# Patient Record
Sex: FEMALE | Race: WHITE | NOT HISPANIC OR LATINO | Employment: FULL TIME | URBAN - METROPOLITAN AREA
[De-identification: names, ages, dates, MRNs, and addresses within clinical notes are randomized per-mention and may not be internally consistent; named-entity substitution may affect disease eponyms.]

---

## 2018-05-24 ENCOUNTER — HOSPITAL ENCOUNTER (EMERGENCY)
Facility: HOSPITAL | Age: 33
Discharge: HOME | End: 2018-05-24
Attending: EMERGENCY MEDICINE
Payer: COMMERCIAL

## 2018-05-24 VITALS
HEIGHT: 59 IN | SYSTOLIC BLOOD PRESSURE: 138 MMHG | WEIGHT: 205.94 LBS | HEART RATE: 78 BPM | RESPIRATION RATE: 18 BRPM | OXYGEN SATURATION: 98 % | TEMPERATURE: 98 F | DIASTOLIC BLOOD PRESSURE: 72 MMHG | BODY MASS INDEX: 41.52 KG/M2

## 2018-05-24 DIAGNOSIS — J06.9 UPPER RESPIRATORY TRACT INFECTION, UNSPECIFIED TYPE: ICD-10-CM

## 2018-05-24 DIAGNOSIS — R05.9 COUGH: ICD-10-CM

## 2018-05-24 DIAGNOSIS — K11.20 PAROTIDITIS: Primary | ICD-10-CM

## 2018-05-24 LAB
ALBUMIN SERPL BCP-MCNC: 3.8 G/DL
ALP SERPL-CCNC: 52 U/L
ALT SERPL W/O P-5'-P-CCNC: 21 U/L
ANION GAP SERPL CALC-SCNC: 12 MMOL/L
APTT BLDCRRT: 27.1 SEC
AST SERPL-CCNC: 19 U/L
BACTERIA #/AREA URNS HPF: ABNORMAL /HPF
BASOPHILS # BLD AUTO: 0.05 K/UL
BASOPHILS NFR BLD: 0.4 %
BILIRUB SERPL-MCNC: 0.3 MG/DL
BILIRUB UR QL STRIP: NEGATIVE
BNP SERPL-MCNC: <10 PG/ML
BUN SERPL-MCNC: 13 MG/DL
CALCIUM SERPL-MCNC: 9.3 MG/DL
CHLORIDE SERPL-SCNC: 103 MMOL/L
CLARITY UR: ABNORMAL
CO2 SERPL-SCNC: 24 MMOL/L
COLOR UR: YELLOW
CREAT SERPL-MCNC: 0.8 MG/DL
DEPRECATED S PYO AG THROAT QL EIA: NEGATIVE
DIFFERENTIAL METHOD: NORMAL
EOSINOPHIL # BLD AUTO: 0.1 K/UL
EOSINOPHIL NFR BLD: 1 %
ERYTHROCYTE [DISTWIDTH] IN BLOOD BY AUTOMATED COUNT: 13.4 %
EST. GFR  (AFRICAN AMERICAN): >60 ML/MIN/1.73 M^2
EST. GFR  (NON AFRICAN AMERICAN): >60 ML/MIN/1.73 M^2
GLUCOSE SERPL-MCNC: 94 MG/DL
GLUCOSE UR QL STRIP: NEGATIVE
HCT VFR BLD AUTO: 41.2 %
HGB BLD-MCNC: 13.9 G/DL
HGB UR QL STRIP: ABNORMAL
INR PPP: 1
KETONES UR QL STRIP: NEGATIVE
LACTATE SERPL-SCNC: 1 MMOL/L
LEUKOCYTE ESTERASE UR QL STRIP: ABNORMAL
LYMPHOCYTES # BLD AUTO: 3.7 K/UL
LYMPHOCYTES NFR BLD: 29.8 %
MAGNESIUM SERPL-MCNC: 2.1 MG/DL
MCH RBC QN AUTO: 29.8 PG
MCHC RBC AUTO-ENTMCNC: 33.7 G/DL
MCV RBC AUTO: 88 FL
MICROSCOPIC COMMENT: ABNORMAL
MONOCYTES # BLD AUTO: 0.8 K/UL
MONOCYTES NFR BLD: 6.6 %
NEUTROPHILS # BLD AUTO: 7.7 K/UL
NEUTROPHILS NFR BLD: 62.2 %
NITRITE UR QL STRIP: POSITIVE
PH UR STRIP: 6 [PH] (ref 5–8)
PHOSPHATE SERPL-MCNC: 3.9 MG/DL
PLATELET # BLD AUTO: 293 K/UL
PMV BLD AUTO: 9.4 FL
POTASSIUM SERPL-SCNC: 3.6 MMOL/L
PROCALCITONIN SERPL IA-MCNC: 0.03 NG/ML
PROT SERPL-MCNC: 7.5 G/DL
PROT UR QL STRIP: NEGATIVE
PROTHROMBIN TIME: 10.1 SEC
RBC # BLD AUTO: 4.67 M/UL
RBC #/AREA URNS HPF: 2 /HPF (ref 0–4)
SODIUM SERPL-SCNC: 139 MMOL/L
SP GR UR STRIP: <=1.005 (ref 1–1.03)
TROPONIN I SERPL DL<=0.01 NG/ML-MCNC: <0.006 NG/ML
URN SPEC COLLECT METH UR: ABNORMAL
UROBILINOGEN UR STRIP-ACNC: NEGATIVE EU/DL
WBC # BLD AUTO: 12.43 K/UL
WBC #/AREA URNS HPF: 5 /HPF (ref 0–5)

## 2018-05-24 PROCEDURE — 83605 ASSAY OF LACTIC ACID: CPT

## 2018-05-24 PROCEDURE — 96374 THER/PROPH/DIAG INJ IV PUSH: CPT

## 2018-05-24 PROCEDURE — 87086 URINE CULTURE/COLONY COUNT: CPT

## 2018-05-24 PROCEDURE — 83880 ASSAY OF NATRIURETIC PEPTIDE: CPT

## 2018-05-24 PROCEDURE — 93005 ELECTROCARDIOGRAM TRACING: CPT

## 2018-05-24 PROCEDURE — 85025 COMPLETE CBC W/AUTO DIFF WBC: CPT

## 2018-05-24 PROCEDURE — 85730 THROMBOPLASTIN TIME PARTIAL: CPT

## 2018-05-24 PROCEDURE — 87040 BLOOD CULTURE FOR BACTERIA: CPT

## 2018-05-24 PROCEDURE — 87077 CULTURE AEROBIC IDENTIFY: CPT

## 2018-05-24 PROCEDURE — 96361 HYDRATE IV INFUSION ADD-ON: CPT

## 2018-05-24 PROCEDURE — 87081 CULTURE SCREEN ONLY: CPT

## 2018-05-24 PROCEDURE — 84484 ASSAY OF TROPONIN QUANT: CPT

## 2018-05-24 PROCEDURE — 80053 COMPREHEN METABOLIC PANEL: CPT

## 2018-05-24 PROCEDURE — 93010 ELECTROCARDIOGRAM REPORT: CPT | Mod: ,,, | Performed by: INTERNAL MEDICINE

## 2018-05-24 PROCEDURE — 87880 STREP A ASSAY W/OPTIC: CPT

## 2018-05-24 PROCEDURE — 25000003 PHARM REV CODE 250: Performed by: EMERGENCY MEDICINE

## 2018-05-24 PROCEDURE — 85610 PROTHROMBIN TIME: CPT

## 2018-05-24 PROCEDURE — 25500020 PHARM REV CODE 255: Performed by: EMERGENCY MEDICINE

## 2018-05-24 PROCEDURE — 99284 EMERGENCY DEPT VISIT MOD MDM: CPT | Mod: 25

## 2018-05-24 PROCEDURE — 63600175 PHARM REV CODE 636 W HCPCS: Performed by: EMERGENCY MEDICINE

## 2018-05-24 PROCEDURE — 81000 URINALYSIS NONAUTO W/SCOPE: CPT

## 2018-05-24 PROCEDURE — 83735 ASSAY OF MAGNESIUM: CPT

## 2018-05-24 PROCEDURE — 84145 PROCALCITONIN (PCT): CPT

## 2018-05-24 PROCEDURE — 87088 URINE BACTERIA CULTURE: CPT

## 2018-05-24 PROCEDURE — 84100 ASSAY OF PHOSPHORUS: CPT

## 2018-05-24 PROCEDURE — 87186 SC STD MICRODIL/AGAR DIL: CPT

## 2018-05-24 RX ORDER — FEXOFENADINE HCL 60 MG
60 TABLET ORAL DAILY
COMMUNITY

## 2018-05-24 RX ORDER — CLINDAMYCIN HYDROCHLORIDE 150 MG/1
300 CAPSULE ORAL 4 TIMES DAILY
Qty: 56 CAPSULE | Refills: 0 | Status: SHIPPED | OUTPATIENT
Start: 2018-05-24 | End: 2018-05-31

## 2018-05-24 RX ORDER — BENZONATATE 100 MG/1
100 CAPSULE ORAL 3 TIMES DAILY PRN
COMMUNITY
End: 2023-11-03

## 2018-05-24 RX ORDER — METHYLPREDNISOLONE 4 MG/1
TABLET ORAL
Qty: 1 PACKAGE | Refills: 0 | Status: SHIPPED | OUTPATIENT
Start: 2018-05-24 | End: 2023-11-03

## 2018-05-24 RX ORDER — DEXAMETHASONE SODIUM PHOSPHATE 4 MG/ML
4 INJECTION, SOLUTION INTRA-ARTICULAR; INTRALESIONAL; INTRAMUSCULAR; INTRAVENOUS; SOFT TISSUE
Status: COMPLETED | OUTPATIENT
Start: 2018-05-24 | End: 2018-05-24

## 2018-05-24 RX ORDER — CLINDAMYCIN HYDROCHLORIDE 150 MG/1
300 CAPSULE ORAL
Status: COMPLETED | OUTPATIENT
Start: 2018-05-24 | End: 2018-05-24

## 2018-05-24 RX ADMIN — CLINDAMYCIN HYDROCHLORIDE 300 MG: 150 CAPSULE ORAL at 06:05

## 2018-05-24 RX ADMIN — SODIUM CHLORIDE 2802 ML: 0.9 INJECTION, SOLUTION INTRAVENOUS at 02:05

## 2018-05-24 RX ADMIN — IOHEXOL 75 ML: 350 INJECTION, SOLUTION INTRAVENOUS at 04:05

## 2018-05-24 RX ADMIN — DEXAMETHASONE SODIUM PHOSPHATE 4 MG: 4 INJECTION, SOLUTION INTRAMUSCULAR; INTRAVENOUS at 06:05

## 2018-05-24 NOTE — ED PROVIDER NOTES
SCRIBE #1 NOTE: I, Adele Levine, am scribing for, and in the presence of, Rogelio Rollins Jr., MD. I have scribed the entire note.      History      Chief Complaint   Patient presents with    Facial Swelling     right sided facial swelling for one day. Denies toothache or trauma        Review of patient's allergies indicates:   Allergen Reactions    Penicillins Hives        HPI   HPI    5/24/2018, 2:39 AM   History obtained from the patient      History of Present Illness: Paul Fitzgerald is a 33 y.o. female patient who presents to the Emergency Department for facial swelling which onset gradually earlier today. Symptoms are constant and moderate in severity. Pt reports going to Urgent care earlier today for cold sxs. Pt reports being given a steroid shot and was prescribed an inhaler, [place] pearls, and antibiotics, but was told to hold off on the antibiotics. Pt reports sxs started after being seen at Urgent care. No mitigating or exacerbating factors reported. Associated sxs include cough, congestion, sore throat, and 3 episodes of diarrhea. Patient denies any CP, SOB, fever, chills, nausea, emesis, abd pain, difficulty swallowing, and all other sxs at this time. No further complaints or concerns at this time.         Arrival mode: Personal vehicle      PCP: Primary Doctor No       Past Medical History:  Past medical history reviewed not relevant      Past Surgical History:  Past surgical history reviewed not relevant      Family History:  Family history reviewed not relevant      Social History:  Social History    Social History Main Topics    Social History Main Topics    Smoking status: Unknown if ever smoked    Smokeless tobacco: Unknown if ever used    Alcohol Use: Unknown drinking history    Drug Use: Unknown if ever used    Sexual Activity: Unknown         ROS   Review of Systems   Constitutional: Negative for chills and fever.   HENT: Positive for congestion, facial swelling and sore throat. Negative  for trouble swallowing.    Respiratory: Positive for cough. Negative for shortness of breath.    Cardiovascular: Negative for chest pain.   Gastrointestinal: Positive for diarrhea. Negative for abdominal pain, nausea and vomiting.   Genitourinary: Negative for dysuria.   Musculoskeletal: Negative for back pain.   Skin: Negative for rash.   Neurological: Negative for weakness.   Hematological: Does not bruise/bleed easily.   All other systems reviewed and are negative.      Physical Exam      Initial Vitals [05/24/18 0217]   BP Pulse Resp Temp SpO2   129/69 82 20 97.5 °F (36.4 °C) 96 %      MAP       89          Physical Exam  Nursing Notes and Vital Signs Reviewed.  Constitutional: Patient is in no acute distress. Well-developed and well-nourished.  Head: Atraumatic. Normocephalic.  Eyes: PERRL. EOM intact. Conjunctivae are not pale. No scleral icterus.  Ears: Right TM normal. Left TM normal. No erythema. No bulging. No effusion or air-fluid levels. No perforation.   Nose: Patent nares. Turbinates are normal. No drainage.   Throat: Moist mucous membranes. Posterior oropharynx is symmetric without erythema. No trismus. Normal handling of secretions. No stridor. Large firm mass tenderness that is 6x8cm to R lower cheek (in parotid area). 1+ tonsillar edema with exudates. Airway intact.  Voice normal.   Neck: Supple. Full ROM. No lymphadenopathy. No palpable neck masses.  Cardiovascular: Regular rate. Regular rhythm. No murmurs, rubs, or gallops. Distal pulses are 2+ and symmetric.  Pulmonary/Chest: No respiratory distress. Clear to auscultation bilaterally. No wheezing or rales.  Abdominal: Soft and non-distended.  There is no tenderness.  No rebound, guarding, or rigidity. Good bowel sounds.  Genitourinary: No CVA tenderness  Musculoskeletal: Moves all extremities. No obvious deformities. No edema. No calf tenderness.  Skin: Warm and dry.  Neurological:  Alert, awake, and appropriate.  Normal speech.  No acute focal  "neurological deficits are appreciated.  Psychiatric: Normal affect. Good eye contact. Appropriate in content.    ED Course    Procedures  ED Vital Signs:  Vitals:    05/24/18 0217 05/24/18 0317 05/24/18 0642   BP: 129/69 (!) 140/76 138/72   Pulse: 82 83 78   Resp: 20 (!) 24 18   Temp: 97.5 °F (36.4 °C)  97.8 °F (36.6 °C)   TempSrc: Oral     SpO2: 96% 95% 98%   Weight: 93.4 kg (205 lb 14.6 oz)     Height: 4' 11" (1.499 m)         Abnormal Lab Results:  Labs Reviewed   COMPREHENSIVE METABOLIC PANEL - Abnormal; Notable for the following:        Result Value    Alkaline Phosphatase 52 (*)     All other components within normal limits   URINALYSIS - Abnormal; Notable for the following:     Appearance, UA Hazy (*)     Specific Gravity, UA <=1.005 (*)     Occult Blood UA 2+ (*)     Nitrite, UA Positive (*)     Leukocytes, UA 2+ (*)     All other components within normal limits   URINALYSIS MICROSCOPIC - Abnormal; Notable for the following:     Bacteria, UA Many (*)     All other components within normal limits   CULTURE, BLOOD    Narrative:     Aerobic and anaerobic   THROAT SCREEN, RAPID   CULTURE, BLOOD   CULTURE, URINE   CULTURE, STREP A,  THROAT   CBC W/ AUTO DIFFERENTIAL   LACTIC ACID, PLASMA   MAGNESIUM   PHOSPHORUS   APTT   PROTIME-INR   B-TYPE NATRIURETIC PEPTIDE   TROPONIN I   PROCALCITONIN        All Lab Results:  Results for orders placed or performed during the hospital encounter of 05/24/18   Blood culture x two cultures. Draw prior to antibiotics.   Result Value Ref Range    Blood Culture, Routine No Growth to date    Rapid strep screen   Result Value Ref Range    Rapid Strep A Screen Negative Negative   CBC auto differential   Result Value Ref Range    WBC 12.43 3.90 - 12.70 K/uL    RBC 4.67 4.00 - 5.40 M/uL    Hemoglobin 13.9 12.0 - 16.0 g/dL    Hematocrit 41.2 37.0 - 48.5 %    MCV 88 82 - 98 fL    MCH 29.8 27.0 - 31.0 pg    MCHC 33.7 32.0 - 36.0 g/dL    RDW 13.4 11.5 - 14.5 %    Platelets 293 150 - 350 " K/uL    MPV 9.4 9.2 - 12.9 fL    Gran # (ANC) 7.7 1.8 - 7.7 K/uL    Lymph # 3.7 1.0 - 4.8 K/uL    Mono # 0.8 0.3 - 1.0 K/uL    Eos # 0.1 0.0 - 0.5 K/uL    Baso # 0.05 0.00 - 0.20 K/uL    Gran% 62.2 38.0 - 73.0 %    Lymph% 29.8 18.0 - 48.0 %    Mono% 6.6 4.0 - 15.0 %    Eosinophil% 1.0 0.0 - 8.0 %    Basophil% 0.4 0.0 - 1.9 %    Differential Method Automated    Comprehensive metabolic panel   Result Value Ref Range    Sodium 139 136 - 145 mmol/L    Potassium 3.6 3.5 - 5.1 mmol/L    Chloride 103 95 - 110 mmol/L    CO2 24 23 - 29 mmol/L    Glucose 94 70 - 110 mg/dL    BUN, Bld 13 6 - 20 mg/dL    Creatinine 0.8 0.5 - 1.4 mg/dL    Calcium 9.3 8.7 - 10.5 mg/dL    Total Protein 7.5 6.0 - 8.4 g/dL    Albumin 3.8 3.5 - 5.2 g/dL    Total Bilirubin 0.3 0.1 - 1.0 mg/dL    Alkaline Phosphatase 52 (L) 55 - 135 U/L    AST 19 10 - 40 U/L    ALT 21 10 - 44 U/L    Anion Gap 12 8 - 16 mmol/L    eGFR if African American >60 >60 mL/min/1.73 m^2    eGFR if non African American >60 >60 mL/min/1.73 m^2   Lactic acid, plasma #1   Result Value Ref Range    Lactate (Lactic Acid) 1.0 0.5 - 2.2 mmol/L   Urinalysis   Result Value Ref Range    Specimen UA Urine, Clean Catch     Color, UA Yellow Yellow, Straw, Roxana    Appearance, UA Hazy (A) Clear    pH, UA 6.0 5.0 - 8.0    Specific Gravity, UA <=1.005 (A) 1.005 - 1.030    Protein, UA Negative Negative    Glucose, UA Negative Negative    Ketones, UA Negative Negative    Bilirubin (UA) Negative Negative    Occult Blood UA 2+ (A) Negative    Nitrite, UA Positive (A) Negative    Urobilinogen, UA Negative <2.0 EU/dL    Leukocytes, UA 2+ (A) Negative   Magnesium   Result Value Ref Range    Magnesium 2.1 1.6 - 2.6 mg/dL   Phosphorus   Result Value Ref Range    Phosphorus 3.9 2.7 - 4.5 mg/dL   APTT   Result Value Ref Range    aPTT 27.1 21.0 - 32.0 sec   Protime-INR   Result Value Ref Range    Prothrombin Time 10.1 9.0 - 12.5 sec    INR 1.0 0.8 - 1.2   Brain natriuretic peptide   Result Value Ref Range     BNP <10 0 - 99 pg/mL   Troponin I   Result Value Ref Range    Troponin I <0.006 0.000 - 0.026 ng/mL   Procalcitonin   Result Value Ref Range    Procalcitonin 0.03 <0.25 ng/mL   Urinalysis Microscopic   Result Value Ref Range    RBC, UA 2 0 - 4 /hpf    WBC, UA 5 0 - 5 /hpf    Bacteria, UA Many (A) None-Occ /hpf    Microscopic Comment SEE COMMENT          Imaging Results:  Imaging Results          CT Soft Tissue Neck With Contrast (In process)    Procedure changed from CT Maxillofacial With Contrast  Per [facility], pt's CT results enlarged right parotid gland suggestive of acute parotitis. Adjacent fat stranding and inflammation involving the platysma.                X-Ray Chest AP Portable (Preliminary result)  Result time 05/24/18 04:50:33    ED Interpretation by Rogelio Rollins Jr., MD (05/24/18 04:50:33, [facility] - , Emergency Medicine)    naf                             The EKG was ordered, reviewed, and independently interpreted by the ED provider.  Interpretation time: 2:51  Rate: 86 BPM  Rhythm: normal sinus rhythm  Interpretation: Low voltage QRS. No STEMI.             The Emergency Provider reviewed the vital signs and test results, which are outlined above.    ED Discussion     5:31 AM: Reassessed pt at this time. Pt reports she feels a yeast infection coming on so she has been taking Monistat for it. Discussed with pt all pertinent ED information and results. Discussed pt dx and plan of tx. Gave pt all f/u and return to the ED instructions if symptoms worsen or if pt unable to be seen by PCP/ENT within 24 hours for reevaluation. All questions and concerns were addressed at this time. Pt expresses understanding of information and instructions, and is comfortable with plan to discharge. Pt is stable for discharge.    Regarding UPPER RESPIRATORY ILLNESS, for treatment, I encouraged patient to: drink plenty of fluids; get lots of rest; take medications as prescribed; use  over-the-counter medications for symptomatic treatment;  and use a humidifier or steam in the bathroom to improve patency of upper airway.  Patient instructed to notify primary care provider, or return to the emergency department, if the they: have a cough most days or have a cough that returns frequently; begin coughing up blood; develop a high fever or shaking chills; have a low-grade fever for three or more days; develop thick, greenish mucus, especially if it has a bad smell; and experience shortness of breath or chest pain. For prevention, discussed with patient the importance of refraining from smoking (if applicable), getting annual influenza vaccines, reducing exposure to air pollution, and the need to frequently wash hands to avoid spread of infection.       ED Medication(s):  Medications   sodium chloride 0.9% bolus 2,802 mL (0 mL/kg × 93.4 kg (Dosing Weight) Intravenous Stopped 5/24/18 0557)   omnipaque 350 iohexol 75 mL (75 mLs Intravenous Given 5/24/18 0406)   clindamycin capsule 300 mg (300 mg Oral Given 5/24/18 0605)   dexamethasone injection 4 mg (4 mg Intravenous Given 5/24/18 0605)       Discharge Medication List as of 5/24/2018  5:39 AM      START taking these medications    Details   clindamycin (CLEOCIN) 150 MG capsule Take 2 capsules (300 mg total) by mouth 4 (four) times daily., Starting Thu 5/24/2018, Until Thu 5/31/2018, Print      methylPREDNISolone (MEDROL DOSEPACK) 4 mg tablet Take medrol dose pack as directed, Print             Follow-up Information     [facility]. Schedule an appointment as soon as possible for a visit in 1 day.    Specialty:  Internal Medicine  Contact information:  [address]  [facility] [address] [phone]  Additional information:  (off [facility]) 1st floor           [facility] In 1 day.    Specialty:  Otolaryngology  Contact information:  [address]  [facility] [address] [phone]  Additional  information:  (off [facility]) 4th floor           [facility]    Specialty:  Emergency Medicine  Why:  As needed, If symptoms worsen  Contact information:  [address]  [facility] [address] [phone]                   Medical Decision Making    Medical Decision Making:   Clinical Tests:   Lab Tests: Ordered and Reviewed  Radiological Study: Ordered and Reviewed  Medical Tests: Ordered and Reviewed           Scribe Attestation:   Scribe #1: I performed the above scribed service and the documentation accurately describes the services I performed. I attest to the accuracy of the note.    Attending:   Physician Attestation Statement for Scribe #1: I, Rogelio Rollins Jr., MD, personally performed the services described in this documentation, as scribed by Adele Levine, in my presence, and it is both accurate and complete.          Clinical Impression       ICD-10-CM ICD-9-CM   1. Parotiditis K11.20 527.2   2. Cough R05 786.2   3. Upper respiratory tract infection, unspecified type J06.9 465.9       Disposition:   Disposition: Discharged  Condition: Stable         Rogelio Rollins Jr., MD  05/24/18 2031

## 2018-05-26 ENCOUNTER — TELEPHONE (OUTPATIENT)
Dept: EMERGENCY MEDICINE | Facility: HOSPITAL | Age: 33
End: 2018-05-26

## 2018-05-26 LAB
BACTERIA THROAT CULT: NORMAL
BACTERIA UR CULT: NORMAL

## 2018-05-26 NOTE — TELEPHONE ENCOUNTER
----- Message from Colten Jane MD sent at 5/25/2018  6:59 PM CDT -----  UTI; continue present Clindamycin for facial infection and add Cipro 500 bid x 5 days

## 2018-05-29 LAB
BACTERIA BLD CULT: NORMAL
BACTERIA BLD CULT: NORMAL

## 2021-08-16 ENCOUNTER — OFFICE VISIT (OUTPATIENT)
Dept: URGENT CARE | Facility: CLINIC | Age: 36
End: 2021-08-16
Payer: COMMERCIAL

## 2021-08-16 ENCOUNTER — PATIENT MESSAGE (OUTPATIENT)
Dept: URGENT CARE | Facility: CLINIC | Age: 36
End: 2021-08-16

## 2021-08-16 VITALS
TEMPERATURE: 99 F | HEIGHT: 59 IN | SYSTOLIC BLOOD PRESSURE: 106 MMHG | BODY MASS INDEX: 35.28 KG/M2 | WEIGHT: 175 LBS | RESPIRATION RATE: 16 BRPM | HEART RATE: 91 BPM | DIASTOLIC BLOOD PRESSURE: 68 MMHG | OXYGEN SATURATION: 96 %

## 2021-08-16 DIAGNOSIS — U07.1 COVID-19 VIRUS INFECTION: ICD-10-CM

## 2021-08-16 DIAGNOSIS — F17.200 SMOKER: ICD-10-CM

## 2021-08-16 DIAGNOSIS — Z11.59 SCREENING FOR VIRAL DISEASE: Primary | ICD-10-CM

## 2021-08-16 LAB
CTP QC/QA: YES
SARS-COV-2 RDRP RESP QL NAA+PROBE: POSITIVE

## 2021-08-16 PROCEDURE — U0002: ICD-10-PCS | Mod: QW,S$GLB,, | Performed by: INTERNAL MEDICINE

## 2021-08-16 PROCEDURE — 3008F PR BODY MASS INDEX (BMI) DOCUMENTED: ICD-10-PCS | Mod: CPTII,S$GLB,, | Performed by: INTERNAL MEDICINE

## 2021-08-16 PROCEDURE — 3074F PR MOST RECENT SYSTOLIC BLOOD PRESSURE < 130 MM HG: ICD-10-PCS | Mod: CPTII,S$GLB,, | Performed by: INTERNAL MEDICINE

## 2021-08-16 PROCEDURE — 1159F PR MEDICATION LIST DOCUMENTED IN MEDICAL RECORD: ICD-10-PCS | Mod: CPTII,S$GLB,, | Performed by: INTERNAL MEDICINE

## 2021-08-16 PROCEDURE — 99203 OFFICE O/P NEW LOW 30 MIN: CPT | Mod: S$GLB,,, | Performed by: INTERNAL MEDICINE

## 2021-08-16 PROCEDURE — 99203 PR OFFICE/OUTPT VISIT, NEW, LEVL III, 30-44 MIN: ICD-10-PCS | Mod: S$GLB,,, | Performed by: INTERNAL MEDICINE

## 2021-08-16 PROCEDURE — 3008F BODY MASS INDEX DOCD: CPT | Mod: CPTII,S$GLB,, | Performed by: INTERNAL MEDICINE

## 2021-08-16 PROCEDURE — 1160F PR REVIEW ALL MEDS BY PRESCRIBER/CLIN PHARMACIST DOCUMENTED: ICD-10-PCS | Mod: CPTII,S$GLB,, | Performed by: INTERNAL MEDICINE

## 2021-08-16 PROCEDURE — 3074F SYST BP LT 130 MM HG: CPT | Mod: CPTII,S$GLB,, | Performed by: INTERNAL MEDICINE

## 2021-08-16 PROCEDURE — U0002 COVID-19 LAB TEST NON-CDC: HCPCS | Mod: QW,S$GLB,, | Performed by: INTERNAL MEDICINE

## 2021-08-16 PROCEDURE — 3078F DIAST BP <80 MM HG: CPT | Mod: CPTII,S$GLB,, | Performed by: INTERNAL MEDICINE

## 2021-08-16 PROCEDURE — 3078F PR MOST RECENT DIASTOLIC BLOOD PRESSURE < 80 MM HG: ICD-10-PCS | Mod: CPTII,S$GLB,, | Performed by: INTERNAL MEDICINE

## 2021-08-16 PROCEDURE — 1160F RVW MEDS BY RX/DR IN RCRD: CPT | Mod: CPTII,S$GLB,, | Performed by: INTERNAL MEDICINE

## 2021-08-16 PROCEDURE — 1159F MED LIST DOCD IN RCRD: CPT | Mod: CPTII,S$GLB,, | Performed by: INTERNAL MEDICINE

## 2021-08-18 ENCOUNTER — INFUSION (OUTPATIENT)
Dept: INFECTIOUS DISEASES | Facility: HOSPITAL | Age: 36
End: 2021-08-18
Attending: INTERNAL MEDICINE
Payer: COMMERCIAL

## 2021-08-18 ENCOUNTER — TELEPHONE (OUTPATIENT)
Dept: URGENT CARE | Facility: CLINIC | Age: 36
End: 2021-08-18

## 2021-08-18 VITALS
OXYGEN SATURATION: 96 % | SYSTOLIC BLOOD PRESSURE: 117 MMHG | TEMPERATURE: 97 F | BODY MASS INDEX: 35.28 KG/M2 | HEIGHT: 59 IN | DIASTOLIC BLOOD PRESSURE: 58 MMHG | HEART RATE: 78 BPM | RESPIRATION RATE: 18 BRPM | WEIGHT: 175 LBS

## 2021-08-18 DIAGNOSIS — U07.1 COVID-19 VIRUS DETECTED: Primary | ICD-10-CM

## 2021-08-18 DIAGNOSIS — U07.1 COVID-19: Primary | ICD-10-CM

## 2021-08-18 PROCEDURE — M0243 CASIRIVI AND IMDEVI INFUSION: HCPCS | Performed by: INTERNAL MEDICINE

## 2021-08-18 PROCEDURE — 63600175 PHARM REV CODE 636 W HCPCS: Performed by: INTERNAL MEDICINE

## 2021-08-18 PROCEDURE — 25000003 PHARM REV CODE 250: Performed by: INTERNAL MEDICINE

## 2021-08-18 RX ORDER — SODIUM CHLORIDE 0.9 % (FLUSH) 0.9 %
10 SYRINGE (ML) INJECTION
Status: DISCONTINUED | OUTPATIENT
Start: 2021-08-18 | End: 2023-11-03

## 2021-08-18 RX ORDER — PROMETHAZINE HYDROCHLORIDE AND DEXTROMETHORPHAN HYDROBROMIDE 6.25; 15 MG/5ML; MG/5ML
5 SYRUP ORAL EVERY 4 HOURS PRN
Qty: 180 ML | Refills: 0 | Status: SHIPPED | OUTPATIENT
Start: 2021-08-18 | End: 2021-08-28

## 2021-08-18 RX ORDER — DIPHENHYDRAMINE HYDROCHLORIDE 50 MG/ML
25 INJECTION INTRAMUSCULAR; INTRAVENOUS ONCE AS NEEDED
Status: DISCONTINUED | OUTPATIENT
Start: 2021-08-18 | End: 2023-11-03

## 2021-08-18 RX ORDER — ACETAMINOPHEN 325 MG/1
650 TABLET ORAL ONCE AS NEEDED
Status: DISCONTINUED | OUTPATIENT
Start: 2021-08-18 | End: 2023-11-03

## 2021-08-18 RX ORDER — ONDANSETRON 4 MG/1
4 TABLET, ORALLY DISINTEGRATING ORAL ONCE AS NEEDED
Status: DISCONTINUED | OUTPATIENT
Start: 2021-08-18 | End: 2023-11-03

## 2021-08-18 RX ORDER — ALBUTEROL SULFATE 90 UG/1
2 AEROSOL, METERED RESPIRATORY (INHALATION)
Status: DISCONTINUED | OUTPATIENT
Start: 2021-08-18 | End: 2023-11-03

## 2021-08-18 RX ORDER — ALBUTEROL SULFATE 90 UG/1
2 AEROSOL, METERED RESPIRATORY (INHALATION) EVERY 6 HOURS PRN
Qty: 18 G | Refills: 0 | Status: SHIPPED | OUTPATIENT
Start: 2021-08-18 | End: 2023-11-03

## 2021-08-18 RX ORDER — EPINEPHRINE 0.3 MG/.3ML
0.3 INJECTION SUBCUTANEOUS
Status: DISCONTINUED | OUTPATIENT
Start: 2021-08-18 | End: 2023-11-03

## 2021-08-18 RX ADMIN — CASIRIVIMAB AND IMDEVIMAB 600 MG: 600; 600 INJECTION, SOLUTION, CONCENTRATE INTRAVENOUS at 10:08

## 2022-02-22 ENCOUNTER — PATIENT MESSAGE (OUTPATIENT)
Dept: RESEARCH | Facility: HOSPITAL | Age: 37
End: 2022-02-22
Payer: COMMERCIAL

## 2022-03-15 ENCOUNTER — OFFICE VISIT (OUTPATIENT)
Dept: URGENT CARE | Facility: CLINIC | Age: 37
End: 2022-03-15
Payer: COMMERCIAL

## 2022-03-15 VITALS
OXYGEN SATURATION: 96 % | WEIGHT: 165 LBS | DIASTOLIC BLOOD PRESSURE: 59 MMHG | HEART RATE: 89 BPM | RESPIRATION RATE: 16 BRPM | HEIGHT: 59 IN | TEMPERATURE: 100 F | BODY MASS INDEX: 33.26 KG/M2 | SYSTOLIC BLOOD PRESSURE: 95 MMHG

## 2022-03-15 DIAGNOSIS — J02.0 STREP PHARYNGITIS: Primary | ICD-10-CM

## 2022-03-15 DIAGNOSIS — Z71.1 CONCERN ABOUT SEXUALLY TRANSMITTED DISEASE IN FEMALE WITHOUT DIAGNOSIS: ICD-10-CM

## 2022-03-15 LAB
CTP QC/QA: YES
CTP QC/QA: YES
MOLECULAR STREP A: POSITIVE
SARS-COV-2 RDRP RESP QL NAA+PROBE: NEGATIVE

## 2022-03-15 PROCEDURE — 3008F PR BODY MASS INDEX (BMI) DOCUMENTED: ICD-10-PCS | Mod: CPTII,S$GLB,, | Performed by: NURSE PRACTITIONER

## 2022-03-15 PROCEDURE — 3078F PR MOST RECENT DIASTOLIC BLOOD PRESSURE < 80 MM HG: ICD-10-PCS | Mod: CPTII,S$GLB,, | Performed by: NURSE PRACTITIONER

## 2022-03-15 PROCEDURE — 96372 THER/PROPH/DIAG INJ SC/IM: CPT | Mod: S$GLB,,, | Performed by: NURSE PRACTITIONER

## 2022-03-15 PROCEDURE — 1160F PR REVIEW ALL MEDS BY PRESCRIBER/CLIN PHARMACIST DOCUMENTED: ICD-10-PCS | Mod: CPTII,S$GLB,, | Performed by: NURSE PRACTITIONER

## 2022-03-15 PROCEDURE — 1160F RVW MEDS BY RX/DR IN RCRD: CPT | Mod: CPTII,S$GLB,, | Performed by: NURSE PRACTITIONER

## 2022-03-15 PROCEDURE — 3078F DIAST BP <80 MM HG: CPT | Mod: CPTII,S$GLB,, | Performed by: NURSE PRACTITIONER

## 2022-03-15 PROCEDURE — 99213 PR OFFICE/OUTPT VISIT, EST, LEVL III, 20-29 MIN: ICD-10-PCS | Mod: 25,S$GLB,CS, | Performed by: NURSE PRACTITIONER

## 2022-03-15 PROCEDURE — 99213 OFFICE O/P EST LOW 20 MIN: CPT | Mod: 25,S$GLB,CS, | Performed by: NURSE PRACTITIONER

## 2022-03-15 PROCEDURE — 1159F MED LIST DOCD IN RCRD: CPT | Mod: CPTII,S$GLB,, | Performed by: NURSE PRACTITIONER

## 2022-03-15 PROCEDURE — 3074F SYST BP LT 130 MM HG: CPT | Mod: CPTII,S$GLB,, | Performed by: NURSE PRACTITIONER

## 2022-03-15 PROCEDURE — 1159F PR MEDICATION LIST DOCUMENTED IN MEDICAL RECORD: ICD-10-PCS | Mod: CPTII,S$GLB,, | Performed by: NURSE PRACTITIONER

## 2022-03-15 PROCEDURE — 87651 STREP A DNA AMP PROBE: CPT | Mod: QW,S$GLB,, | Performed by: NURSE PRACTITIONER

## 2022-03-15 PROCEDURE — 87651 POCT STREP A MOLECULAR: ICD-10-PCS | Mod: QW,S$GLB,, | Performed by: NURSE PRACTITIONER

## 2022-03-15 PROCEDURE — U0002 COVID-19 LAB TEST NON-CDC: HCPCS | Mod: QW,S$GLB,, | Performed by: NURSE PRACTITIONER

## 2022-03-15 PROCEDURE — 96372 PR INJECTION,THERAP/PROPH/DIAG2ST, IM OR SUBCUT: ICD-10-PCS | Mod: S$GLB,,, | Performed by: NURSE PRACTITIONER

## 2022-03-15 PROCEDURE — 3008F BODY MASS INDEX DOCD: CPT | Mod: CPTII,S$GLB,, | Performed by: NURSE PRACTITIONER

## 2022-03-15 PROCEDURE — U0002: ICD-10-PCS | Mod: QW,S$GLB,, | Performed by: NURSE PRACTITIONER

## 2022-03-15 PROCEDURE — 3074F PR MOST RECENT SYSTOLIC BLOOD PRESSURE < 130 MM HG: ICD-10-PCS | Mod: CPTII,S$GLB,, | Performed by: NURSE PRACTITIONER

## 2022-03-15 RX ORDER — LIDOCAINE HYDROCHLORIDE 10 MG/ML
2.1 INJECTION INFILTRATION; PERINEURAL
Status: COMPLETED | OUTPATIENT
Start: 2022-03-15 | End: 2022-03-15

## 2022-03-15 RX ORDER — CEFTRIAXONE 1 G/1
1 INJECTION, POWDER, FOR SOLUTION INTRAMUSCULAR; INTRAVENOUS
Status: COMPLETED | OUTPATIENT
Start: 2022-03-15 | End: 2022-03-15

## 2022-03-15 RX ORDER — CEPHALEXIN 500 MG/1
500 CAPSULE ORAL EVERY 12 HOURS
Qty: 20 CAPSULE | Refills: 0 | Status: SHIPPED | OUTPATIENT
Start: 2022-03-15 | End: 2022-03-25

## 2022-03-15 RX ADMIN — CEFTRIAXONE 1 G: 1 INJECTION, POWDER, FOR SOLUTION INTRAMUSCULAR; INTRAVENOUS at 09:03

## 2022-03-15 RX ADMIN — LIDOCAINE HYDROCHLORIDE 2.1 ML: 10 INJECTION INFILTRATION; PERINEURAL at 09:03

## 2022-03-15 NOTE — PROGRESS NOTES
"Subjective:       Patient ID: Paul Fitzgerald is a 37 y.o. female.    Vitals:  height is 4' 11" (1.499 m) and weight is 74.8 kg (165 lb). Her temperature is 100.3 °F (37.9 °C). Her blood pressure is 95/59 (abnormal) and her pulse is 89. Her respiration is 16 and oxygen saturation is 96%.     Chief Complaint: Sore Throat    38 y/o female here for sore throat x3 days with pain upon swallowing.  Also c/o frontal headache. Has productive cough, but reports most likely a smoker's cough and feels unrelated to throat problem, at this time.  Pt also concerned of possibility of STI infection to throat. Denies vaginal discharge, pain, or other symptoms. No known STI contact.   Has h/o Covid in August 2021. Patient reports she got antiviral effusion. Patient reports she is fully covid vaccinated. Denies chest pain, SOB, and wheeze. Denies nausea, vomiting, and diarrhea.     Sore Throat   This is a new problem. The current episode started in the past 7 days. The problem has been gradually worsening. The pain is worse on the left side. There has been no fever. The pain is at a severity of 6/10. Associated symptoms include coughing (productive), headaches, swollen glands and trouble swallowing. Pertinent negatives include no congestion. Associated symptoms comments: weakness. She has had no exposure to strep or mono. She has tried NSAIDs for the symptoms. The treatment provided mild relief.       HENT: Positive for sore throat and trouble swallowing. Negative for congestion.    Respiratory: Positive for cough (productive).    Neurological: Positive for headaches.       Objective:      Physical Exam   Constitutional: She is oriented to person, place, and time.   HENT:   Head: Normocephalic.   Ears:   Right Ear: Tympanic membrane, external ear and ear canal normal.   Left Ear: Tympanic membrane, external ear and ear canal normal.   Nose: Nose normal.   Mouth/Throat: Uvula is midline. Mucous membranes are moist. Posterior " oropharyngeal edema and posterior oropharyngeal erythema present. Tonsils are 3+ on the right. Tonsils are 3+ on the left. Oropharynx is clear.   Eyes: Conjunctivae are normal. Right eye exhibits no discharge. Left eye exhibits no discharge.   Cardiovascular: Normal rate, regular rhythm, normal heart sounds and normal pulses.   Pulmonary/Chest: Effort normal and breath sounds normal.   Abdominal: Normal appearance. Soft. flat abdomen  Musculoskeletal: Normal range of motion.         General: Normal range of motion.   Neurological: She is alert and oriented to person, place, and time.   Skin: Skin is warm and dry. Capillary refill takes more than 3 seconds.   Psychiatric: Her behavior is normal. Mood normal.   Nursing note and vitals reviewed.        Assessment:       1. Strep pharyngitis    2. Concern about sexually transmitted disease in female without diagnosis        Results for orders placed or performed in visit on 03/15/22   POCT COVID-19 Rapid Screening   Result Value Ref Range    POC Rapid COVID Negative Negative     Acceptable Yes    POCT Strep A, Molecular   Result Value Ref Range    Molecular Strep A, POC Positive (A) Negative     Acceptable Yes      Plan:         Strep pharyngitis  -     POCT COVID-19 Rapid Screening  -     POCT Strep A, Molecular  -     Cancel: C.trach/N.gonor AMP RNA, (Non-Genital)  -     cephALEXin (KEFLEX) 500 MG capsule; Take 1 capsule (500 mg total) by mouth every 12 (twelve) hours. for 10 days  Dispense: 20 capsule; Refill: 0    Concern about sexually transmitted disease in female without diagnosis  -     cefTRIAXone injection 1 g    Other orders  -     LIDOcaine HCL 10 mg/ml (1%) injection 2.1 mL         Patient Instructions   Oral fluids  Rest  Steam (hot tea, hot showers)  Droplet and contact precautions

## 2022-08-06 ENCOUNTER — OFFICE VISIT (OUTPATIENT)
Dept: URGENT CARE | Facility: CLINIC | Age: 37
End: 2022-08-06
Payer: COMMERCIAL

## 2022-08-06 VITALS
BODY MASS INDEX: 33.26 KG/M2 | OXYGEN SATURATION: 98 % | HEIGHT: 59 IN | HEART RATE: 75 BPM | WEIGHT: 165 LBS | DIASTOLIC BLOOD PRESSURE: 65 MMHG | RESPIRATION RATE: 17 BRPM | SYSTOLIC BLOOD PRESSURE: 121 MMHG

## 2022-08-06 DIAGNOSIS — T19.2XXA FOREIGN BODY IN VAGINA, INITIAL ENCOUNTER: Primary | ICD-10-CM

## 2022-08-06 PROCEDURE — 3078F DIAST BP <80 MM HG: CPT | Mod: CPTII,S$GLB,,

## 2022-08-06 PROCEDURE — 3008F BODY MASS INDEX DOCD: CPT | Mod: CPTII,S$GLB,,

## 2022-08-06 PROCEDURE — 3074F PR MOST RECENT SYSTOLIC BLOOD PRESSURE < 130 MM HG: ICD-10-PCS | Mod: CPTII,S$GLB,,

## 2022-08-06 PROCEDURE — 99214 OFFICE O/P EST MOD 30 MIN: CPT | Mod: S$GLB,,,

## 2022-08-06 PROCEDURE — 3078F PR MOST RECENT DIASTOLIC BLOOD PRESSURE < 80 MM HG: ICD-10-PCS | Mod: CPTII,S$GLB,,

## 2022-08-06 PROCEDURE — 1160F PR REVIEW ALL MEDS BY PRESCRIBER/CLIN PHARMACIST DOCUMENTED: ICD-10-PCS | Mod: CPTII,S$GLB,,

## 2022-08-06 PROCEDURE — 1159F MED LIST DOCD IN RCRD: CPT | Mod: CPTII,S$GLB,,

## 2022-08-06 PROCEDURE — 99214 PR OFFICE/OUTPT VISIT, EST, LEVL IV, 30-39 MIN: ICD-10-PCS | Mod: S$GLB,,,

## 2022-08-06 PROCEDURE — 3074F SYST BP LT 130 MM HG: CPT | Mod: CPTII,S$GLB,,

## 2022-08-06 PROCEDURE — 3008F PR BODY MASS INDEX (BMI) DOCUMENTED: ICD-10-PCS | Mod: CPTII,S$GLB,,

## 2022-08-06 PROCEDURE — 1160F RVW MEDS BY RX/DR IN RCRD: CPT | Mod: CPTII,S$GLB,,

## 2022-08-06 PROCEDURE — 1159F PR MEDICATION LIST DOCUMENTED IN MEDICAL RECORD: ICD-10-PCS | Mod: CPTII,S$GLB,,

## 2022-08-06 NOTE — PATIENT INSTRUCTIONS
If you are not allergic and have no contraindications, take tylenol (acetominophen) for fever control, chills, or body aches every 4 hours. Do not exceed 4000 mg/ day.If you are not allergic and have no contraindications, take Motrin (Ibuprofen) every 4 hours for fever, chills, pain or inflammation. Do not exceed 2400 mg/day. You can alternate taking tylenol and motrin.        If you were prescribed a narcotic, muscle relaxer, or controlled substance, do not drive or operate heavy equipment or machinery while taking these medications.       You must understand that you've received an Urgent Care treatment only and that you may be released before all your medical problems are known or treated. You, the patient, will arrange for follow up care as instructed.    Follow up with your PCP or specialty clinic as directed in the next 1-2 weeks if not improved or as needed. You can call [phone] to schedule an appointment with the appropriate provider.    If your condition worsens we recommend that you receive another evaluation at the emergency room immediately or contact your primary medical clinic's after hours call service to discuss your concerns.    Please go to the Emergency Department for any concerns or worsening of condition.

## 2022-08-06 NOTE — PROGRESS NOTES
"Subjective:       Patient ID: Paul Fitzgerald is a 37 y.o. female.    Vitals:  height is 4' 11" (1.499 m) and weight is 74.8 kg (165 lb). Her blood pressure is 121/65 and her pulse is 75. Her respiration is 17 and oxygen saturation is 98%.     Chief Complaint: Other    Patient states she has a condom displaced in her vagina. It has been there since last night. Denies any associated sx including vaginal discharge, bleeding. No concern for STDs.    Other  This is a new problem. The current episode started yesterday.     ROS    Objective:      Physical Exam   Constitutional: She is oriented to person, place, and time. She appears well-developed.   HENT:   Head: Normocephalic and atraumatic.   Ears:   Right Ear: External ear normal.   Left Ear: External ear normal.   Nose: Nose normal. No nasal deformity. No epistaxis.   Mouth/Throat: Oropharynx is clear and moist and mucous membranes are normal.   Eyes: Lids are normal.   Neck: Trachea normal and phonation normal. Neck supple.   Cardiovascular: Normal pulses.   Pulmonary/Chest: Effort normal.   Abdominal: Normal appearance and bowel sounds are normal. She exhibits no distension. Soft. There is no abdominal tenderness.   Genitourinary:         Neurological: She is alert and oriented to person, place, and time.   Skin: Skin is warm, dry and intact.   Psychiatric: Her speech is normal and behavior is normal.   Nursing note and vitals reviewed.chaperone present           Assessment:       1. Foreign body in vagina, initial encounter          Plan:         Condom removed on exam with no complications or difficulties.     Foreign body in vagina, initial encounter         Patient Instructions   If you are not allergic and have no contraindications, take tylenol (acetominophen) for fever control, chills, or body aches every 4 hours. Do not exceed 4000 mg/ day.If you are not allergic and have no contraindications, take Motrin (Ibuprofen) every 4 hours for fever, chills, pain " or inflammation. Do not exceed 2400 mg/day. You can alternate taking tylenol and motrin.        If you were prescribed a narcotic, muscle relaxer, or controlled substance, do not drive or operate heavy equipment or machinery while taking these medications.       You must understand that you've received an Urgent Care treatment only and that you may be released before all your medical problems are known or treated. You, the patient, will arrange for follow up care as instructed.    Follow up with your PCP or specialty clinic as directed in the next 1-2 weeks if not improved or as needed. You can call [phone] to schedule an appointment with the appropriate provider.    If your condition worsens we recommend that you receive another evaluation at the emergency room immediately or contact your primary medical clinic's after hours call service to discuss your concerns.    Please go to the Emergency Department for any concerns or worsening of condition.

## 2023-04-27 ENCOUNTER — OFFICE VISIT (OUTPATIENT)
Dept: URGENT CARE | Facility: CLINIC | Age: 38
End: 2023-04-27
Payer: COMMERCIAL

## 2023-04-27 VITALS
HEART RATE: 85 BPM | OXYGEN SATURATION: 98 % | WEIGHT: 164.88 LBS | DIASTOLIC BLOOD PRESSURE: 69 MMHG | HEIGHT: 59 IN | BODY MASS INDEX: 33.24 KG/M2 | TEMPERATURE: 100 F | SYSTOLIC BLOOD PRESSURE: 111 MMHG | RESPIRATION RATE: 16 BRPM

## 2023-04-27 DIAGNOSIS — J03.90 EXUDATIVE TONSILLITIS: Primary | ICD-10-CM

## 2023-04-27 DIAGNOSIS — J02.9 SORE THROAT: ICD-10-CM

## 2023-04-27 LAB
CTP QC/QA: YES
MOLECULAR STREP A: NEGATIVE

## 2023-04-27 PROCEDURE — 99214 PR OFFICE/OUTPT VISIT, EST, LEVL IV, 30-39 MIN: ICD-10-PCS | Mod: 25,S$GLB,, | Performed by: NURSE PRACTITIONER

## 2023-04-27 PROCEDURE — 96372 THER/PROPH/DIAG INJ SC/IM: CPT | Mod: S$GLB,,, | Performed by: NURSE PRACTITIONER

## 2023-04-27 PROCEDURE — 99214 OFFICE O/P EST MOD 30 MIN: CPT | Mod: 25,S$GLB,, | Performed by: NURSE PRACTITIONER

## 2023-04-27 PROCEDURE — 96372 PR INJECTION,THERAP/PROPH/DIAG2ST, IM OR SUBCUT: ICD-10-PCS | Mod: S$GLB,,, | Performed by: NURSE PRACTITIONER

## 2023-04-27 PROCEDURE — 87651 POCT STREP A MOLECULAR: ICD-10-PCS | Mod: QW,S$GLB,, | Performed by: NURSE PRACTITIONER

## 2023-04-27 PROCEDURE — 87651 STREP A DNA AMP PROBE: CPT | Mod: QW,S$GLB,, | Performed by: NURSE PRACTITIONER

## 2023-04-27 RX ORDER — DEXAMETHASONE SODIUM PHOSPHATE 4 MG/ML
8 INJECTION, SOLUTION INTRA-ARTICULAR; INTRALESIONAL; INTRAMUSCULAR; INTRAVENOUS; SOFT TISSUE
Status: COMPLETED | OUTPATIENT
Start: 2023-04-27 | End: 2023-04-27

## 2023-04-27 RX ORDER — AZITHROMYCIN 250 MG/1
TABLET, FILM COATED ORAL
Qty: 6 TABLET | Refills: 0 | Status: SHIPPED | OUTPATIENT
Start: 2023-04-27 | End: 2023-05-02

## 2023-04-27 RX ORDER — DEXAMETHASONE SODIUM PHOSPHATE 100 MG/10ML
8 INJECTION INTRAMUSCULAR; INTRAVENOUS
Status: DISCONTINUED | OUTPATIENT
Start: 2023-04-27 | End: 2023-04-27

## 2023-04-27 RX ADMIN — DEXAMETHASONE SODIUM PHOSPHATE 8 MG: 4 INJECTION, SOLUTION INTRA-ARTICULAR; INTRALESIONAL; INTRAMUSCULAR; INTRAVENOUS; SOFT TISSUE at 10:04

## 2023-04-27 NOTE — PATIENT INSTRUCTIONS
- You must understand that you have received an Urgent Care treatment only and that you may be released before all of your medical problems are known or treated.   - You, the patient, will arrange for follow up care as instructed.   - If your condition worsens or fails to improve we recommend that you receive another evaluation at the ER immediately or contact your PCP to discuss your concerns.   - You can call [phone] or [phone] to help schedule an appointment with the appropriate provider.    Drink plenty of fluids   Get lots of rest  Tylenol or ibuprofen for pain/fever  Warm salt water gargles for sore throat  Warm tea with honey and lemon for sore throat  Change your toothbrush in about 3 days to prevent the chance of reinfection  You are contagious and should stay home while sick. After 24 hours of antibiotics, you may return to work/school if you have not had a fever for 24 hours (without fever reducing medications) and otherwise feel well.   Use magic mouthwash as needed for sore throat. Swish, gargle, and spit. Do not swallow

## 2023-04-27 NOTE — PROGRESS NOTES
"Subjective:      Patient ID: Paul Fitzgerald is a 38 y.o. female.    Vitals:  height is 4' 11" (1.499 m) and weight is 74.8 kg (164 lb 14.5 oz). Her oral temperature is 100.4 °F (38 °C) (abnormal). Her blood pressure is 111/69 and her pulse is 85. Her respiration is 16 and oxygen saturation is 98%.     Chief Complaint: Sore Throat    Pt is a 39 yo female w/ c/o sore throat, fever, swollen lymph nodes for 2 days. Pt states that her throat is so swollen and painful that she can barely swallow. She has been taking ibuprofen for her symptoms with little improvement. Denies known sick contacts.     Sore Throat   This is a new problem. The current episode started in the past 7 days. The problem has been gradually worsening. Neither side of throat is experiencing more pain than the other. The maximum temperature recorded prior to her arrival was 100.4 - 100.9 F. The fever has been present for 3 to 4 days. The pain is moderate. Associated symptoms include ear pain, a hoarse voice, a plugged ear sensation, neck pain, swollen glands and trouble swallowing. Pertinent negatives include no headaches or shortness of breath. She has tried NSAIDs (teas) for the symptoms. The treatment provided no relief.     HENT:  Positive for ear pain, sore throat and trouble swallowing.    Neck: Positive for neck pain.   Respiratory:  Negative for shortness of breath.    Neurological:  Negative for headaches.    Objective:     Physical Exam   Constitutional: She is oriented to person, place, and time. She appears well-developed. She is cooperative.  Non-toxic appearance. She does not appear ill. No distress.   HENT:   Head: Normocephalic and atraumatic.   Ears:   Right Ear: Hearing, tympanic membrane, external ear and ear canal normal.   Left Ear: Hearing, tympanic membrane, external ear and ear canal normal.   Nose: Nose normal. No mucosal edema, rhinorrhea or nasal deformity. No epistaxis. Right sinus exhibits no maxillary sinus tenderness " and no frontal sinus tenderness. Left sinus exhibits no maxillary sinus tenderness and no frontal sinus tenderness.   Mouth/Throat: Uvula is midline and mucous membranes are normal. No trismus in the jaw. Normal dentition. No uvula swelling. Posterior oropharyngeal erythema present. No oropharyngeal exudate or posterior oropharyngeal edema. Tonsils are 3+ on the right. Tonsils are 3+ on the left. Tonsillar exudate.   Eyes: Conjunctivae and lids are normal. No scleral icterus.   Neck: Trachea normal and phonation normal. Neck supple. No edema present. No erythema present. No neck rigidity present.   Cardiovascular: Normal rate, regular rhythm, normal heart sounds and normal pulses.   Pulmonary/Chest: Effort normal and breath sounds normal. No respiratory distress. She has no decreased breath sounds. She has no rhonchi.   Abdominal: Normal appearance.   Musculoskeletal: Normal range of motion.         General: No deformity. Normal range of motion.   Lymphadenopathy:        Head (right side): Tonsillar adenopathy present.        Head (left side): Tonsillar adenopathy present.     She has cervical adenopathy.        Right cervical: Superficial cervical adenopathy present.        Left cervical: Superficial cervical adenopathy present.   Neurological: She is alert and oriented to person, place, and time. She exhibits normal muscle tone. Coordination normal.   Skin: Skin is warm, dry, intact, not diaphoretic and not pale.   Psychiatric: Her speech is normal and behavior is normal. Judgment and thought content normal.   Nursing note and vitals reviewed.    Results for orders placed or performed in visit on 04/27/23   POCT Strep A, Molecular   Result Value Ref Range    Molecular Strep A, POC Negative Negative     Acceptable Yes      Assessment:     1. Exudative tonsillitis    2. Sore throat        Plan:       Exudative tonsillitis  -     Discontinue: dexAMETHasone injection 8 mg  -     azithromycin (Z-MILIND) 250  MG tablet; Take 2 tablets by mouth on day 1; Take 1 tablet by mouth on days 2-5  Dispense: 6 tablet; Refill: 0    Sore throat  -     POCT Strep A, Molecular  -     (Magic mouthwash) 1:1:1 diphenhydrAMINE(Benadryl) 12.5mg/5ml liq, aluminum & magnesium hydroxide-simethicone (Maalox), LIDOcaine viscous 2%; Swish and spit 10 mLs every 4 (four) hours as needed (sore throat).  Dispense: 180 mL; Refill: 0  -     dexAMETHasone injection 8 mg      Patient Instructions   - You must understand that you have received an Urgent Care treatment only and that you may be released before all of your medical problems are known or treated.   - You, the patient, will arrange for follow up care as instructed.   - If your condition worsens or fails to improve we recommend that you receive another evaluation at the ER immediately or contact your PCP to discuss your concerns.   - You can call [phone] or [phone] to help schedule an appointment with the appropriate provider.    Drink plenty of fluids   Get lots of rest  Tylenol or ibuprofen for pain/fever  Warm salt water gargles for sore throat  Warm tea with honey and lemon for sore throat  Change your toothbrush in about 3 days to prevent the chance of reinfection  You are contagious and should stay home while sick. After 24 hours of antibiotics, you may return to work/school if you have not had a fever for 24 hours (without fever reducing medications) and otherwise feel well.   Use magic mouthwash as needed for sore throat. Swish, gargle, and spit. Do not swallow

## 2023-10-25 ENCOUNTER — OFFICE VISIT (OUTPATIENT)
Dept: URGENT CARE | Facility: CLINIC | Age: 38
End: 2023-10-25
Payer: COMMERCIAL

## 2023-10-25 VITALS
DIASTOLIC BLOOD PRESSURE: 66 MMHG | RESPIRATION RATE: 17 BRPM | SYSTOLIC BLOOD PRESSURE: 104 MMHG | WEIGHT: 164.88 LBS | HEIGHT: 59 IN | TEMPERATURE: 98 F | OXYGEN SATURATION: 99 % | HEART RATE: 75 BPM | BODY MASS INDEX: 33.24 KG/M2

## 2023-10-25 DIAGNOSIS — U07.1 COVID-19 VIRUS INFECTION: Primary | ICD-10-CM

## 2023-10-25 LAB
CTP QC/QA: YES
SARS-COV-2 AG RESP QL IA.RAPID: POSITIVE

## 2023-10-25 PROCEDURE — 99213 OFFICE O/P EST LOW 20 MIN: CPT | Mod: S$GLB,,, | Performed by: FAMILY MEDICINE

## 2023-10-25 PROCEDURE — 87811 SARS-COV-2 COVID19 W/OPTIC: CPT | Mod: QW,S$GLB,, | Performed by: FAMILY MEDICINE

## 2023-10-25 PROCEDURE — 99213 PR OFFICE/OUTPT VISIT, EST, LEVL III, 20-29 MIN: ICD-10-PCS | Mod: S$GLB,,, | Performed by: FAMILY MEDICINE

## 2023-10-25 PROCEDURE — 87811 SARS CORONAVIRUS 2 ANTIGEN POCT, MANUAL READ: ICD-10-PCS | Mod: QW,S$GLB,, | Performed by: FAMILY MEDICINE

## 2023-10-25 RX ORDER — FLUTICASONE PROPIONATE 50 MCG
1 SPRAY, SUSPENSION (ML) NASAL DAILY
Qty: 9.9 ML | Refills: 0 | Status: SHIPPED | OUTPATIENT
Start: 2023-10-25

## 2023-10-25 RX ORDER — BENZONATATE 100 MG/1
100 CAPSULE ORAL EVERY 6 HOURS PRN
Qty: 30 CAPSULE | Refills: 1 | Status: SHIPPED | OUTPATIENT
Start: 2023-10-25 | End: 2024-10-24

## 2023-10-26 ENCOUNTER — PATIENT MESSAGE (OUTPATIENT)
Dept: RESEARCH | Facility: HOSPITAL | Age: 38
End: 2023-10-26
Payer: COMMERCIAL

## 2023-10-27 ENCOUNTER — TELEPHONE (OUTPATIENT)
Dept: RESEARCH | Facility: HOSPITAL | Age: 38
End: 2023-10-27
Payer: COMMERCIAL

## 2023-10-27 NOTE — TELEPHONE ENCOUNTER
Called Ms. Fitzgerald to follow up on portal message sent recently regarding an [facility] research study she qualifies to participate in. Discussed Covid-19 [facility] study. She expressed that she is not interested in participating at this time.

## 2023-10-31 ENCOUNTER — TELEPHONE (OUTPATIENT)
Dept: PRIMARY CARE CLINIC | Facility: CLINIC | Age: 38
End: 2023-10-31
Payer: COMMERCIAL

## 2023-11-02 ENCOUNTER — OFFICE VISIT (OUTPATIENT)
Dept: OBSTETRICS AND GYNECOLOGY | Facility: CLINIC | Age: 38
End: 2023-11-02
Payer: COMMERCIAL

## 2023-11-02 ENCOUNTER — LAB VISIT (OUTPATIENT)
Dept: LAB | Facility: HOSPITAL | Age: 38
End: 2023-11-02
Attending: STUDENT IN AN ORGANIZED HEALTH CARE EDUCATION/TRAINING PROGRAM
Payer: COMMERCIAL

## 2023-11-02 VITALS
DIASTOLIC BLOOD PRESSURE: 64 MMHG | BODY MASS INDEX: 33.15 KG/M2 | HEIGHT: 59 IN | SYSTOLIC BLOOD PRESSURE: 112 MMHG | WEIGHT: 164.44 LBS

## 2023-11-02 DIAGNOSIS — Z11.3 SCREEN FOR SEXUALLY TRANSMITTED DISEASES: ICD-10-CM

## 2023-11-02 DIAGNOSIS — Z11.3 SCREEN FOR SEXUALLY TRANSMITTED DISEASES: Primary | ICD-10-CM

## 2023-11-02 DIAGNOSIS — Z87.42 HISTORY OF OVARIAN CYST: ICD-10-CM

## 2023-11-02 LAB
HAV IGM SERPL QL IA: NORMAL
HBV CORE IGM SERPL QL IA: NORMAL
HBV SURFACE AG SERPL QL IA: NORMAL
HCV AB SERPL QL IA: NORMAL
HIV 1+2 AB+HIV1 P24 AG SERPL QL IA: NORMAL

## 2023-11-02 PROCEDURE — 3078F DIAST BP <80 MM HG: CPT | Mod: CPTII,S$GLB,, | Performed by: STUDENT IN AN ORGANIZED HEALTH CARE EDUCATION/TRAINING PROGRAM

## 2023-11-02 PROCEDURE — 1159F MED LIST DOCD IN RCRD: CPT | Mod: CPTII,S$GLB,, | Performed by: STUDENT IN AN ORGANIZED HEALTH CARE EDUCATION/TRAINING PROGRAM

## 2023-11-02 PROCEDURE — 99213 PR OFFICE/OUTPT VISIT, EST, LEVL III, 20-29 MIN: ICD-10-PCS | Mod: S$GLB,,, | Performed by: STUDENT IN AN ORGANIZED HEALTH CARE EDUCATION/TRAINING PROGRAM

## 2023-11-02 PROCEDURE — 99999 PR PBB SHADOW E&M-EST. PATIENT-LVL IV: CPT | Mod: PBBFAC,,, | Performed by: STUDENT IN AN ORGANIZED HEALTH CARE EDUCATION/TRAINING PROGRAM

## 2023-11-02 PROCEDURE — 3078F PR MOST RECENT DIASTOLIC BLOOD PRESSURE < 80 MM HG: ICD-10-PCS | Mod: CPTII,S$GLB,, | Performed by: STUDENT IN AN ORGANIZED HEALTH CARE EDUCATION/TRAINING PROGRAM

## 2023-11-02 PROCEDURE — 87389 HIV-1 AG W/HIV-1&-2 AB AG IA: CPT | Performed by: STUDENT IN AN ORGANIZED HEALTH CARE EDUCATION/TRAINING PROGRAM

## 2023-11-02 PROCEDURE — 3008F BODY MASS INDEX DOCD: CPT | Mod: CPTII,S$GLB,, | Performed by: STUDENT IN AN ORGANIZED HEALTH CARE EDUCATION/TRAINING PROGRAM

## 2023-11-02 PROCEDURE — 99213 OFFICE O/P EST LOW 20 MIN: CPT | Mod: S$GLB,,, | Performed by: STUDENT IN AN ORGANIZED HEALTH CARE EDUCATION/TRAINING PROGRAM

## 2023-11-02 PROCEDURE — 3074F SYST BP LT 130 MM HG: CPT | Mod: CPTII,S$GLB,, | Performed by: STUDENT IN AN ORGANIZED HEALTH CARE EDUCATION/TRAINING PROGRAM

## 2023-11-02 PROCEDURE — 80074 ACUTE HEPATITIS PANEL: CPT | Performed by: STUDENT IN AN ORGANIZED HEALTH CARE EDUCATION/TRAINING PROGRAM

## 2023-11-02 PROCEDURE — 1159F PR MEDICATION LIST DOCUMENTED IN MEDICAL RECORD: ICD-10-PCS | Mod: CPTII,S$GLB,, | Performed by: STUDENT IN AN ORGANIZED HEALTH CARE EDUCATION/TRAINING PROGRAM

## 2023-11-02 PROCEDURE — 86592 SYPHILIS TEST NON-TREP QUAL: CPT | Performed by: STUDENT IN AN ORGANIZED HEALTH CARE EDUCATION/TRAINING PROGRAM

## 2023-11-02 PROCEDURE — 3074F PR MOST RECENT SYSTOLIC BLOOD PRESSURE < 130 MM HG: ICD-10-PCS | Mod: CPTII,S$GLB,, | Performed by: STUDENT IN AN ORGANIZED HEALTH CARE EDUCATION/TRAINING PROGRAM

## 2023-11-02 PROCEDURE — 99999 PR PBB SHADOW E&M-EST. PATIENT-LVL IV: ICD-10-PCS | Mod: PBBFAC,,, | Performed by: STUDENT IN AN ORGANIZED HEALTH CARE EDUCATION/TRAINING PROGRAM

## 2023-11-02 PROCEDURE — 87591 N.GONORRHOEAE DNA AMP PROB: CPT | Performed by: STUDENT IN AN ORGANIZED HEALTH CARE EDUCATION/TRAINING PROGRAM

## 2023-11-02 PROCEDURE — 3008F PR BODY MASS INDEX (BMI) DOCUMENTED: ICD-10-PCS | Mod: CPTII,S$GLB,, | Performed by: STUDENT IN AN ORGANIZED HEALTH CARE EDUCATION/TRAINING PROGRAM

## 2023-11-02 PROCEDURE — 36415 COLL VENOUS BLD VENIPUNCTURE: CPT | Mod: PN | Performed by: STUDENT IN AN ORGANIZED HEALTH CARE EDUCATION/TRAINING PROGRAM

## 2023-11-02 PROCEDURE — 81514 NFCT DS BV&VAGINITIS DNA ALG: CPT | Performed by: STUDENT IN AN ORGANIZED HEALTH CARE EDUCATION/TRAINING PROGRAM

## 2023-11-02 RX ORDER — COPPER 313.4 MG/1
1 INTRAUTERINE DEVICE INTRAUTERINE
COMMUNITY

## 2023-11-02 RX ORDER — DESONIDE 0.5 MG/G
CREAM TOPICAL 2 TIMES DAILY
COMMUNITY
Start: 2023-11-01

## 2023-11-02 NOTE — PROGRESS NOTES
History & Physical  Gynecology      SUBJECTIVE:     Chief Complaint: STD TESTING       History of Present Illness:  Paul Fitzgerald is a 38 y.o. F who presents to establish care. Was seeing a GYN in [place], wanting to transfer care here. Has also been getting [facility]I screen[facility]. No complaints today. She has a paragard IUD in place since 2019. She likes well enough, insertion was very painful so she wants to keep it. Having regular but heavy cycles. Painful on days 1-2. Had an isolate epidsode of intermenstrual bleeding after a long flight in July. [facility] showed IUD in good position but possible ovarian cyst. She would like another US to follow up. No discharge, itching, burning. Occasional PMB, no dyspareunia. Has history of abnormal pap with colpo in 2019. Subsequent normal pap smears, last one June 2022. Of note, her mom has a history of cervical and rectal cancer. No other known family history of cancers.      Review of patient's allergies indicates:   Allergen Reactions    Penicillins Hives       History reviewed. No pertinent past medical history.  History reviewed. No pertinent surgical history.  OB History    No obstetric history on file.       Family History   Problem Relation Age of Onset    No Known Problems Father     Cancer Mother     Breast cancer Neg Hx     Colon cancer Neg Hx     Ovarian cancer Neg Hx      Social History     Tobacco Use    Smoking status: Every Day     Types: Cigarettes     Passive exposure: Current    Smokeless tobacco: Never   Substance Use Topics    Alcohol use: Yes     Comment: soc    Drug use: Yes     Types: Marijuana       Current Outpatient Medications   Medication Sig    (Magic mouthwash) 1:1:1 diphenhydrAMINE(Benadryl) 12.5mg/5ml liq, aluminum & magnesium hydroxide-simethicone (Maalox), LIDOcaine viscous 2% Swish and spit 10 mLs every 4 (four) hours as needed (sore throat).    benzonatate (TESSALON  PERLES) 100 MG capsule Take 1 capsule (100 mg total) by mouth every 6 (six) hours as needed for Cough.    benzonatate (TESSALON) 100 MG capsule Take 100 mg by mouth 3 (three) times daily as needed for Cough.    copper intrauterine device (PARAGARD T 380A) 380 square mm IUD 1 each by Intrauterine route.    desonide (DESOWEN) 0.05 % cream Apply topically 2 (two) times daily.    dextromethorphan-guaifenesin  mg (MUCINEX DM)  mg per 12 hr tablet Take 1 tablet by mouth every 12 (twelve) hours.    fexofenadine (ALLEGRA) 60 MG tablet Take 60 mg by mouth once daily.    fluticasone propionate (FLONASE) 50 mcg/actuation nasal spray 1 spray (50 mcg total) by Each Nostril route once daily.    ipratropium (ATROVENT HFA) 17 mcg/actuation inhaler Inhale 2 puffs into the lungs every 6 (six) hours. Rescue    methylPREDNISolone (MEDROL DOSEPACK) 4 mg tablet Take medrol dose pack as directed    albuterol (VENTOLIN HFA) 90 mcg/actuation inhaler Inhale 2 puffs into the lungs every 6 (six) hours as needed for Wheezing. Rescue     Current Facility-Administered Medications   Medication    acetaminophen tablet 650 mg    albuterol inhaler 2 puff    diphenhydrAMINE injection 25 mg    EPINEPHrine (EPIPEN) 0.3 mg/0.3 mL pen injection 0.3 mg    methylPREDNISolone sodium succinate injection 40 mg    ondansetron disintegrating tablet 4 mg    sodium chloride 0.9% 500 mL flush bag    sodium chloride 0.9% flush 10 mL         Review of Systems:  Review of Systems   Constitutional:  Negative for fever.   Respiratory:  Negative for shortness of breath.    Cardiovascular:  Negative for chest pain.   Gastrointestinal:  Negative for abdominal pain, nausea and vomiting.   Genitourinary:  Positive for postcoital bleeding. Negative for menstrual problem, pelvic pain, vaginal discharge and vaginal odor.   Integumentary:  Negative for breast mass, breast skin changes and breast tenderness.   Neurological:  Negative for headaches.   Breast: Negative  for mass, skin changes and tenderness       OBJECTIVE:     Physical Exam:  Physical Exam  Vitals reviewed.   Constitutional:       General: She is not in acute distress.     Appearance: Normal appearance. She is well-developed. She is not ill-appearing or toxic-appearing.   HENT:      Head: Normocephalic and atraumatic.      Nose: Nose normal.   Eyes:      Extraocular Movements: Extraocular movements intact.      Conjunctiva/sclera: Conjunctivae normal.   Cardiovascular:      Rate and Rhythm: Normal rate.   Pulmonary:      Effort: Pulmonary effort is normal. No respiratory distress.   Abdominal:      Palpations: Abdomen is soft. There is no mass.      Tenderness: There is no abdominal tenderness. There is no guarding or rebound.   Genitourinary:     Vagina: Normal. No vaginal discharge or bleeding.      Cervix: No cervical motion tenderness.      Uterus: Not enlarged and not tender.       Adnexa:         Right: No mass, tenderness or fullness.          Left: No mass, tenderness or fullness.        Comments: IUD strings visualized.  Musculoskeletal:         General: Normal range of motion.      Cervical back: Normal range of motion.   Skin:     General: Skin is warm and dry.   Neurological:      Mental Status: She is alert. Mental status is at baseline.   Psychiatric:         Mood and Affect: Mood normal.         Behavior: Behavior normal.         Thought Content: Thought content normal.           ASSESSMENT:       ICD-10-CM ICD-9-CM    1. Screen for sexually transmitted diseases  Z11.3 V74.5 HIV 1/2 Ag/Ab (4th Gen)      RPR      HEPATITIS PANEL, ACUTE      C. trachomatis/N. gonorrhoeae by AMP DNA      VAGINOSIS SCREEN BY DNA PROBE      2. History of ovarian cyst  Z87.42 V13.29 US Pelvis Comp with Transvag NON-OB (xpd             Plan:      -- IUD strings visualized.  -- Full STI panel.  -- US ordered given reported history of ovarian cyst.  -- RTC for annual WWE or sooner, PRN.      Adele Hinojosa MD

## 2023-11-03 ENCOUNTER — PATIENT MESSAGE (OUTPATIENT)
Dept: PRIMARY CARE CLINIC | Facility: CLINIC | Age: 38
End: 2023-11-03
Payer: COMMERCIAL

## 2023-11-03 ENCOUNTER — PATIENT MESSAGE (OUTPATIENT)
Dept: SLEEP MEDICINE | Facility: CLINIC | Age: 38
End: 2023-11-03

## 2023-11-03 ENCOUNTER — OFFICE VISIT (OUTPATIENT)
Dept: SLEEP MEDICINE | Facility: CLINIC | Age: 38
End: 2023-11-03
Payer: COMMERCIAL

## 2023-11-03 ENCOUNTER — TELEPHONE (OUTPATIENT)
Dept: PRIMARY CARE CLINIC | Facility: CLINIC | Age: 38
End: 2023-11-03

## 2023-11-03 ENCOUNTER — LAB VISIT (OUTPATIENT)
Dept: LAB | Facility: HOSPITAL | Age: 38
End: 2023-11-03
Attending: INTERNAL MEDICINE
Payer: COMMERCIAL

## 2023-11-03 ENCOUNTER — OFFICE VISIT (OUTPATIENT)
Dept: PRIMARY CARE CLINIC | Facility: CLINIC | Age: 38
End: 2023-11-03
Payer: COMMERCIAL

## 2023-11-03 VITALS
DIASTOLIC BLOOD PRESSURE: 78 MMHG | HEIGHT: 59 IN | OXYGEN SATURATION: 98 % | HEART RATE: 81 BPM | WEIGHT: 167.13 LBS | SYSTOLIC BLOOD PRESSURE: 126 MMHG | BODY MASS INDEX: 33.69 KG/M2

## 2023-11-03 VITALS — BODY MASS INDEX: 33.26 KG/M2 | HEIGHT: 59 IN | WEIGHT: 165 LBS

## 2023-11-03 DIAGNOSIS — G47.33 OSA (OBSTRUCTIVE SLEEP APNEA): ICD-10-CM

## 2023-11-03 DIAGNOSIS — Z00.00 WELLNESS EXAMINATION: ICD-10-CM

## 2023-11-03 DIAGNOSIS — G47.10 HYPERSOMNIA WITH SLEEP APNEA: Primary | ICD-10-CM

## 2023-11-03 DIAGNOSIS — Z80.0 FAMILY HISTORY OF COLON CANCER IN MOTHER: ICD-10-CM

## 2023-11-03 DIAGNOSIS — G47.30 HYPERSOMNIA WITH SLEEP APNEA: Primary | ICD-10-CM

## 2023-11-03 DIAGNOSIS — Z87.891 PERSONAL HISTORY OF NICOTINE DEPENDENCE: ICD-10-CM

## 2023-11-03 DIAGNOSIS — Z00.00 WELLNESS EXAMINATION: Primary | ICD-10-CM

## 2023-11-03 LAB
ALBUMIN SERPL BCP-MCNC: 4 G/DL (ref 3.5–5.2)
ALP SERPL-CCNC: 50 U/L (ref 55–135)
ALT SERPL W/O P-5'-P-CCNC: 20 U/L (ref 10–44)
ANION GAP SERPL CALC-SCNC: 9 MMOL/L (ref 8–16)
AST SERPL-CCNC: 17 U/L (ref 10–40)
BILIRUB SERPL-MCNC: 0.3 MG/DL (ref 0.1–1)
BUN SERPL-MCNC: 9 MG/DL (ref 6–20)
C TRACH DNA SPEC QL NAA+PROBE: NOT DETECTED
CALCIUM SERPL-MCNC: 10 MG/DL (ref 8.7–10.5)
CHLORIDE SERPL-SCNC: 104 MMOL/L (ref 95–110)
CHOLEST SERPL-MCNC: 194 MG/DL (ref 120–199)
CHOLEST/HDLC SERPL: 4 {RATIO} (ref 2–5)
CO2 SERPL-SCNC: 27 MMOL/L (ref 23–29)
CREAT SERPL-MCNC: 0.7 MG/DL (ref 0.5–1.4)
ERYTHROCYTE [DISTWIDTH] IN BLOOD BY AUTOMATED COUNT: 12.5 % (ref 11.5–14.5)
EST. GFR  (NO RACE VARIABLE): >60 ML/MIN/1.73 M^2
GLUCOSE SERPL-MCNC: 113 MG/DL (ref 70–110)
HCT VFR BLD AUTO: 39.4 % (ref 37–48.5)
HDLC SERPL-MCNC: 48 MG/DL (ref 40–75)
HDLC SERPL: 24.7 % (ref 20–50)
HGB BLD-MCNC: 13.2 G/DL (ref 12–16)
LDLC SERPL CALC-MCNC: 118.8 MG/DL (ref 63–159)
MCH RBC QN AUTO: 30.3 PG (ref 27–31)
MCHC RBC AUTO-ENTMCNC: 33.5 G/DL (ref 32–36)
MCV RBC AUTO: 90 FL (ref 82–98)
N GONORRHOEA DNA SPEC QL NAA+PROBE: NOT DETECTED
NONHDLC SERPL-MCNC: 146 MG/DL
PLATELET # BLD AUTO: 306 K/UL (ref 150–450)
PMV BLD AUTO: 10.5 FL (ref 9.2–12.9)
POTASSIUM SERPL-SCNC: 4.1 MMOL/L (ref 3.5–5.1)
PROT SERPL-MCNC: 7.5 G/DL (ref 6–8.4)
RBC # BLD AUTO: 4.36 M/UL (ref 4–5.4)
RPR SER QL: NORMAL
SODIUM SERPL-SCNC: 140 MMOL/L (ref 136–145)
TRIGL SERPL-MCNC: 136 MG/DL (ref 30–150)
WBC # BLD AUTO: 7.95 K/UL (ref 3.9–12.7)

## 2023-11-03 PROCEDURE — 3078F DIAST BP <80 MM HG: CPT | Mod: CPTII,S$GLB,, | Performed by: INTERNAL MEDICINE

## 2023-11-03 PROCEDURE — 3074F PR MOST RECENT SYSTOLIC BLOOD PRESSURE < 130 MM HG: ICD-10-PCS | Mod: CPTII,S$GLB,, | Performed by: INTERNAL MEDICINE

## 2023-11-03 PROCEDURE — 3008F PR BODY MASS INDEX (BMI) DOCUMENTED: ICD-10-PCS | Mod: CPTII,S$GLB,, | Performed by: INTERNAL MEDICINE

## 2023-11-03 PROCEDURE — 3008F PR BODY MASS INDEX (BMI) DOCUMENTED: ICD-10-PCS | Mod: CPTII,95,, | Performed by: NURSE PRACTITIONER

## 2023-11-03 PROCEDURE — 3074F SYST BP LT 130 MM HG: CPT | Mod: CPTII,S$GLB,, | Performed by: INTERNAL MEDICINE

## 2023-11-03 PROCEDURE — 1159F MED LIST DOCD IN RCRD: CPT | Mod: CPTII,S$GLB,, | Performed by: INTERNAL MEDICINE

## 2023-11-03 PROCEDURE — 80053 COMPREHEN METABOLIC PANEL: CPT | Performed by: INTERNAL MEDICINE

## 2023-11-03 PROCEDURE — 3008F BODY MASS INDEX DOCD: CPT | Mod: CPTII,95,, | Performed by: NURSE PRACTITIONER

## 2023-11-03 PROCEDURE — 1159F PR MEDICATION LIST DOCUMENTED IN MEDICAL RECORD: ICD-10-PCS | Mod: CPTII,S$GLB,, | Performed by: INTERNAL MEDICINE

## 2023-11-03 PROCEDURE — 36415 COLL VENOUS BLD VENIPUNCTURE: CPT | Performed by: INTERNAL MEDICINE

## 2023-11-03 PROCEDURE — 99385 PREV VISIT NEW AGE 18-39: CPT | Mod: S$GLB,,, | Performed by: INTERNAL MEDICINE

## 2023-11-03 PROCEDURE — 99204 OFFICE O/P NEW MOD 45 MIN: CPT | Mod: 95,,, | Performed by: NURSE PRACTITIONER

## 2023-11-03 PROCEDURE — 3008F BODY MASS INDEX DOCD: CPT | Mod: CPTII,S$GLB,, | Performed by: INTERNAL MEDICINE

## 2023-11-03 PROCEDURE — 99204 PR OFFICE/OUTPT VISIT, NEW, LEVL IV, 45-59 MIN: ICD-10-PCS | Mod: 95,,, | Performed by: NURSE PRACTITIONER

## 2023-11-03 PROCEDURE — 80061 LIPID PANEL: CPT | Performed by: INTERNAL MEDICINE

## 2023-11-03 PROCEDURE — 99999 PR PBB SHADOW E&M-EST. PATIENT-LVL V: CPT | Mod: PBBFAC,,, | Performed by: INTERNAL MEDICINE

## 2023-11-03 PROCEDURE — 85027 COMPLETE CBC AUTOMATED: CPT | Performed by: INTERNAL MEDICINE

## 2023-11-03 PROCEDURE — 3078F PR MOST RECENT DIASTOLIC BLOOD PRESSURE < 80 MM HG: ICD-10-PCS | Mod: CPTII,S$GLB,, | Performed by: INTERNAL MEDICINE

## 2023-11-03 PROCEDURE — 99385 PR PREVENTIVE VISIT,NEW,18-39: ICD-10-PCS | Mod: S$GLB,,, | Performed by: INTERNAL MEDICINE

## 2023-11-03 PROCEDURE — 99999 PR PBB SHADOW E&M-EST. PATIENT-LVL V: ICD-10-PCS | Mod: PBBFAC,,, | Performed by: INTERNAL MEDICINE

## 2023-11-03 RX ORDER — MODAFINIL 100 MG/1
100 TABLET ORAL DAILY PRN
Qty: 30 TABLET | Refills: 5 | Status: SHIPPED | OUTPATIENT
Start: 2023-11-03 | End: 2024-05-01

## 2023-11-03 RX ORDER — IBUPROFEN 200 MG
1 TABLET ORAL DAILY
Qty: 28 PATCH | Refills: 3 | Status: SHIPPED | OUTPATIENT
Start: 2023-11-03

## 2023-11-03 NOTE — PROGRESS NOTES
[facility] Note    Chief Complaint      Chief Complaint   Patient presents with    Annual Exam    Establish Care    Nicotine Dependence     History of Present Illness      Paul Fitzgerald is a 38 y.o. female who presents today for chief complaint annual wellness .     HPI   Feels well no complaints  Covid last week  She is having a seaosnal allergy flare - she takes allegra and flonase , mucinexd as needed   GERONIMO sees sleep med, needs new DME, is on provigil prn   Saw gyn yesterdday Dr Hinojosa, pap not done   H/o abnl pap colpo in 2019, says he last pap was in 2022, [facility]   She is due for labs   Tobacco abuse 1ppd x20 years, she is ready to quit, asks for smoking cessation  Fam his colorectal cancer mom diagnosed at 33 of rectal cancer, shes usnure if it was rectal or mets, she is still alive   I personally reviewed all past medical, surgical, social and family history.  Colorectal in her mom,   Dad is a paranoid schizophrenic     She is gets recurrent tonsilitis - she is pen allergic, sees ENT     She works as a CPA, works home   Declines flu     Active Problem List with Overview Notes    Diagnosis Date Noted    Family history of colon cancer in mother 11/03/2023    Personal history of nicotine dependence 11/03/2023    Parotiditis 05/24/2018    Upper respiratory tract infection 05/24/2018       Health Maintenance   Topic Date Due    Lipid Panel  Never done    TETANUS VACCINE  Never done    Hepatitis C Screening  Completed       History reviewed. No pertinent past medical history.    Past Surgical History:   Procedure Laterality Date    COLPOSCOPY  2019    TONGUE SURGERY      extra skin removed as a small child       family history includes Cancer in her mother; No Known Problems in her father.    Social History     Tobacco Use    Smoking status: Every Day     Current packs/day: 1.00     Types: Cigarettes     Passive exposure: Current    Smokeless tobacco:  Never   Substance Use Topics    Alcohol use: Yes     Comment: soc    Drug use: Yes     Types: Marijuana       Review of Systems   Constitutional:  Negative for chills, fever, malaise/fatigue and weight loss.   Respiratory:  Negative for cough, sputum production, shortness of breath and wheezing.    Cardiovascular:  Negative for chest pain, palpitations, orthopnea and leg swelling.   Gastrointestinal:  Negative for constipation, diarrhea, nausea and vomiting.   Genitourinary:  Negative for dysuria, frequency, hematuria and urgency.        Outpatient Encounter Medications as of 11/3/2023   Medication Sig Dispense Refill    copper intrauterine device (PARAGARD T 380A) 380 square mm IUD 1 each by Intrauterine route.      desonide (DESOWEN) 0.05 % cream Apply topically 2 (two) times daily.      dextromethorphan-guaifenesin  mg (MUCINEX DM)  mg per 12 hr tablet Take 1 tablet by mouth every 12 (twelve) hours.      fexofenadine (ALLEGRA) 60 MG tablet Take 60 mg by mouth once daily.      fluticasone propionate (FLONASE) 50 mcg/actuation nasal spray 1 spray (50 mcg total) by Each Nostril route once daily. 9.9 mL 0    modafiniL (PROVIGIL) 100 MG Tab Take 1 tablet (100 mg total) by mouth daily as needed. 30 tablet 5    benzonatate (TESSALON PERLES) 100 MG capsule Take 1 capsule (100 mg total) by mouth every 6 (six) hours as needed for Cough. (Patient not taking: Reported on 11/3/2023) 30 capsule 1    nicotine (NICODERM CQ) 21 mg/24 hr Place 1 patch onto the skin once daily. 28 patch 3    [DISCONTINUED] (Magic mouthwash) 1:1:1 diphenhydrAMINE(Benadryl) 12.5mg/5ml liq, aluminum & magnesium hydroxide-simethicone (Maalox), LIDOcaine viscous 2% Swish and spit 10 mLs every 4 (four) hours as needed (sore throat). 180 mL 0    [DISCONTINUED] albuterol (VENTOLIN HFA) 90 mcg/actuation inhaler Inhale 2 puffs into the lungs every 6 (six) hours as needed for Wheezing. Rescue 18 g 0    [DISCONTINUED] benzonatate (TESSALON) 100 MG  "capsule Take 100 mg by mouth 3 (three) times daily as needed for Cough.      [DISCONTINUED] ipratropium (ATROVENT HFA) 17 mcg/actuation inhaler Inhale 2 puffs into the lungs every 6 (six) hours. Rescue      [DISCONTINUED] methylPREDNISolone (MEDROL DOSEPACK) 4 mg tablet Take medrol dose pack as directed 1 Package 0     Facility-Administered Encounter Medications as of 11/3/2023   Medication Dose Route Frequency Provider Last Rate Last Admin    [DISCONTINUED] acetaminophen tablet 650 mg  650 mg Oral Once PRN Stew Pate MD        [DISCONTINUED] albuterol inhaler 2 puff  2 puff Inhalation Q20 Min PRN Stew Pate MD        [DISCONTINUED] diphenhydrAMINE injection 25 mg  25 mg Intravenous Once PRN Stew Pate MD        [DISCONTINUED] EPINEPHrine (EPIPEN) 0.3 mg/0.3 mL pen injection 0.3 mg  0.3 mg Intramuscular PRN Stew Pate MD        [DISCONTINUED] methylPREDNISolone sodium succinate injection 40 mg  40 mg Intravenous Once PRN Stew Pate MD        [DISCONTINUED] ondansetron disintegrating tablet 4 mg  4 mg Oral Once PRN Stew Pate MD        [DISCONTINUED] sodium chloride 0.9% 500 mL flush bag   Intravenous PRN Setw Pate MD        [DISCONTINUED] sodium chloride 0.9% flush 10 mL  10 mL Intravenous PRN Stew Pate MD            Review of patient's allergies indicates:   Allergen Reactions    Penicillins Hives           Physical Exam      Vital Signs  Pulse: 81  SpO2: 98 %  BP: 126/78  BP Location: Right arm  Patient Position: Sitting  Height and Weight  Height: 4' 10.5" (148.6 cm)  Weight: 75.8 kg (167 lb 1.7 oz)  BSA (Calculated - sq m): 1.77 sq meters  BMI (Calculated): 34.3  Weight in (lb) to have BMI = 25: 121.4]    Physical Exam  Vitals reviewed.   Constitutional:       General: She is not in acute distress.     Appearance: She is well-developed. She is not diaphoretic.   HENT:      Head: Normocephalic and atraumatic.      Right Ear: External ear " "normal.      Left Ear: External ear normal.      Nose: Nose normal.   Eyes:      General:         Right eye: No discharge.         Left eye: No discharge.      Conjunctiva/sclera: Conjunctivae normal.   Pulmonary:      Effort: Pulmonary effort is normal. No respiratory distress.   Musculoskeletal:         General: Normal range of motion.      Cervical back: Normal range of motion.   Skin:     Coloration: Skin is not pale.      Findings: No rash.   Neurological:      Mental Status: She is alert and oriented to person, place, and time.   Psychiatric:         Behavior: Behavior normal.         Thought Content: Thought content normal.          Laboratory:  CBC:  No results for input(s): "WBC", "RBC", "HGB", "HCT", "PLT", "MCV", "MCH", "MCHC" in the last 2160 hours.  CMP:  No results for input(s): "GLU", "CALCIUM", "ALBUMIN", "PROT", "NA", "K", "CO2", "CL", "BUN", "ALKPHOS", "ALT", "AST", "BILITOT" in the last 2160 hours.    Invalid input(s): "CREATININ"  URINALYSIS:  No results for input(s): "COLORU", "CLARITYU", "SPECGRAV", "PHUR", "PROTEINUA", "GLUCOSEU", "BILIRUBINCON", "BLOODU", "WBCU", "RBCU", "BACTERIA", "MUCUS", "NITRITE", "LEUKOCYTESUR", "UROBILINOGEN", "HYALINECASTS" in the last 2160 hours.   LIPIDS:  No results for input(s): "TSH", "HDL", "CHOL", "TRIG", "LDLCALC", "CHOLHDL", "NONHDLCHOL", "TOTALCHOLEST" in the last 2160 hours.  TSH:  No results for input(s): "TSH" in the last 2160 hours.  A1C:  No results for input(s): "HGBA1C" in the last 2160 hours.        Assessment/Plan     Paul Fitzgerald is a 38 y.o.female with:    1. Wellness examination  -     Lipid Panel; Future; Expected date: 11/03/2023  -     Comprehensive Metabolic Panel; Future; Expected date: 11/03/2023  -     CBC Without Differential; Future; Expected date: 11/03/2023    2. Personal history of nicotine dependence  -     Ambulatory referral/consult to Smoking Cessation Program; Future; Expected date: 11/10/2023  -     nicotine (NICODERM CQ) 21 " mg/24 hr; Place 1 patch onto the skin once daily.  Dispense: 28 patch; Refill: 3    3. Family history of colon cancer in mother  -     Ambulatory referral/consult to Endo Procedure ; Future; Expected date: 11/04/2023  -     Ambulatory referral/consult to Gastroenterology; Future; Expected date: 11/10/2023         Use of the [facility] Patient Portal discussed and encouraged during today's visit  -Continue current medications and maintain follow up with specialists.  Return to clinic in 1 year prn .  Future Appointments   Date Time Provider Department Center   11/9/2023  8:00 AM [facility]       Paige Pisano MD  11/3/2023 1:19 PM    Primary Care Internal Medicine

## 2023-11-03 NOTE — PROGRESS NOTES
"The patient location is: [place]  The chief complaint leading to consultation is: GERONIMO    Visit type: audiovisual    Face to Face time with patient:15  minutes of total time spent on the encounter, which includes face to face time and non-face to face time preparing to see the patient (eg, review of tests), Obtaining and/or reviewing separately obtained history, Documenting clinical information in the electronic or other health record, Independently interpreting results (not separately reported) and communicating results to the patient/family/caregiver, or Care coordination (not separately reported). Each patient to whom he or she provides medical services by telemedicine is:  (1) informed of the relationship between the physician and patient and the respective role of any other health care provider with respect to management of the patient; and (2) notified that he or she may decline to receive medical services by telemedicine and may withdraw from such care at any time.    Referred by self    HISTORY OF PRESENT ILLNESS: Dx'd with "severe" sleep apnea by HST in [place] ~ 10 yrs ago. No records on file. Moved to [place], [place] and was seeing [facility] [place]. Here to establish new care since moving back home [place]. Remains adherent with cpap (14cm) qhs, can't sleep w/o it Using nose pillow mask w/o chin strap. DS 2 machine. Takes provigil 100mg 1/2-1 tab PRN few times/week but has run out. 200mg caused anxiety. Lost ~ 70# since initial study.   Denies cataplexy, sleep paralysis or hypnagogic hallucinations or frequent vivid/lucid dreaming    Interrogation 30davg 5.2h/n AHI 0.8, 99% mask fit    ESS=10  SH: single,  working remotely now   4' 10.5" (1.486 m)   Wt 74.8 kg (165 lb)   LMP 10/05/2023   BMI 33.90 kg/m²     ASSESSMENT:   GERONIMO, records NA. Adherent with cpap x 10 yrs, benefits from therapy. AHI<5. Needs new DME/supplies    PLAN:   1. Request outside study report and get new supplies via " DME [facility]. If can't get report then plan repeat HST   2. Discussed control of GERONIMO  Rtc 1 yrs, sooner if needed. Resume provigil 100mg 1/2-1 tab qd PRN

## 2023-11-04 LAB
BACTERIAL VAGINOSIS DNA: POSITIVE
CANDIDA GLABRATA DNA: NEGATIVE
CANDIDA KRUSEI DNA: NEGATIVE
CANDIDA RRNA VAG QL PROBE: NEGATIVE
T VAGINALIS RRNA GENITAL QL PROBE: NEGATIVE

## 2023-11-06 RX ORDER — METRONIDAZOLE 500 MG/1
500 TABLET ORAL EVERY 12 HOURS
Qty: 14 TABLET | Refills: 0 | Status: SHIPPED | OUTPATIENT
Start: 2023-11-06 | End: 2023-11-13

## 2023-11-15 ENCOUNTER — TELEPHONE (OUTPATIENT)
Dept: ENDOSCOPY | Facility: HOSPITAL | Age: 38
End: 2023-11-15

## 2023-11-15 ENCOUNTER — CLINICAL SUPPORT (OUTPATIENT)
Dept: ENDOSCOPY | Facility: HOSPITAL | Age: 38
End: 2023-11-15
Attending: INTERNAL MEDICINE
Payer: COMMERCIAL

## 2023-11-15 DIAGNOSIS — Z80.0 FAMILY HISTORY OF COLON CANCER IN MOTHER: ICD-10-CM

## 2023-11-15 NOTE — TELEPHONE ENCOUNTER
Attempted to contact patient to schedule colonoscopy. Left voicemail message with information to call Endoscopy scheduling department at [phone] to schedule procedure. Also, sent message via patient portal.

## 2023-11-15 NOTE — PLAN OF CARE
We attempted to reach you for your scheduled PAT appointment to schedule your colonoscopy. Left voicemail message. Please contact Endoscopy Scheduling at # [phone].

## 2023-11-30 ENCOUNTER — TELEPHONE (OUTPATIENT)
Dept: SURGERY | Facility: CLINIC | Age: 38
End: 2023-11-30
Payer: COMMERCIAL

## 2023-11-30 NOTE — TELEPHONE ENCOUNTER
Spoke with patient regarding appointment with Dr. Valiente. Patient states that she needs to schedule a colonoscopy for family history.  Explained that schedulers will reach out to her for colonoscopy scheduling. Patient verbalizes understanding.Message sent to schedulers and clinic appointment cancelled.

## 2023-12-04 ENCOUNTER — TELEPHONE (OUTPATIENT)
Dept: ENDOSCOPY | Facility: HOSPITAL | Age: 38
End: 2023-12-04
Payer: COMMERCIAL

## 2023-12-04 NOTE — TELEPHONE ENCOUNTER
----- Message from Kriss Peters sent at 12/1/2023  8:33 AM CST -----    ----- Message -----  From: Katie Bunch RN  Sent: 11/30/2023   3:34 PM CST  To: Ciera Huff RN; [facility] Endo Schedulers    Hi - She needs to schedule a colonoscopy. Please reach out to her for scheduling.

## 2023-12-06 ENCOUNTER — CLINICAL SUPPORT (OUTPATIENT)
Dept: SMOKING CESSATION | Facility: CLINIC | Age: 38
End: 2023-12-06

## 2023-12-06 DIAGNOSIS — F17.200 NICOTINE DEPENDENCE: Primary | ICD-10-CM

## 2023-12-06 PROCEDURE — 99404 PR PREVENT COUNSEL,INDIV,60 MIN: ICD-10-PCS | Mod: S$GLB,,,

## 2023-12-06 PROCEDURE — 99404 PREV MED CNSL INDIV APPRX 60: CPT | Mod: S$GLB,,,

## 2023-12-06 PROCEDURE — 99999 PR PBB SHADOW E&M-EST. PATIENT-LVL II: CPT | Mod: PBBFAC,,,

## 2023-12-06 PROCEDURE — 99999 PR PBB SHADOW E&M-EST. PATIENT-LVL II: ICD-10-PCS | Mod: PBBFAC,,,

## 2023-12-06 RX ORDER — IBUPROFEN 200 MG
1 TABLET ORAL DAILY
Qty: 28 PATCH | Refills: 0 | Status: SHIPPED | OUTPATIENT
Start: 2023-12-06

## 2023-12-06 NOTE — PROGRESS NOTES
Patient will be participating in biweekly tobacco cessation meetings and will begin the prescribed tobacco cessation medication regimen of  21 mg nicotine patch QD .  Patient currently smokes 20 cigarettes per day.  Pt's exhaled carbon monoxide level was  28 ppm as per [employer]. (non- smoker = 0-5 ppm.)   Discussed the role of tobacco cessation program, role of nicotine replacement therapy  (NRT) and behavioral changes to assist the patient to reach their goal of being tobacco free.   Education and instruction on the role of the NRT, usage and  proper placement of the patch. Patient verbalized understanding and willingness to use patch.  Provided patient my contact information Danika BARBER @[phone] .

## 2024-01-20 ENCOUNTER — HOSPITAL ENCOUNTER (EMERGENCY)
Facility: HOSPITAL | Age: 39
Discharge: HOME | End: 2024-01-20
Attending: STUDENT IN AN ORGANIZED HEALTH CARE EDUCATION/TRAINING PROGRAM
Payer: COMMERCIAL

## 2024-01-20 VITALS
OXYGEN SATURATION: 98 % | TEMPERATURE: 98 F | SYSTOLIC BLOOD PRESSURE: 97 MMHG | WEIGHT: 167 LBS | DIASTOLIC BLOOD PRESSURE: 57 MMHG | HEART RATE: 75 BPM | RESPIRATION RATE: 15 BRPM | BODY MASS INDEX: 34.31 KG/M2

## 2024-01-20 DIAGNOSIS — M25.519 SHOULDER PAIN: ICD-10-CM

## 2024-01-20 DIAGNOSIS — M25.529 ELBOW PAIN: ICD-10-CM

## 2024-01-20 DIAGNOSIS — M25.569 KNEE PAIN: ICD-10-CM

## 2024-01-20 LAB
B-HCG UR QL: NEGATIVE
CTP QC/QA: YES

## 2024-01-20 PROCEDURE — 63600175 PHARM REV CODE 636 W HCPCS: Performed by: STUDENT IN AN ORGANIZED HEALTH CARE EDUCATION/TRAINING PROGRAM

## 2024-01-20 PROCEDURE — 96372 THER/PROPH/DIAG INJ SC/IM: CPT | Performed by: STUDENT IN AN ORGANIZED HEALTH CARE EDUCATION/TRAINING PROGRAM

## 2024-01-20 PROCEDURE — 99284 EMERGENCY DEPT VISIT MOD MDM: CPT

## 2024-01-20 PROCEDURE — 81025 URINE PREGNANCY TEST: CPT

## 2024-01-20 RX ORDER — KETOROLAC TROMETHAMINE 30 MG/ML
15 INJECTION, SOLUTION INTRAMUSCULAR; INTRAVENOUS
Status: COMPLETED | OUTPATIENT
Start: 2024-01-20 | End: 2024-01-20

## 2024-01-20 RX ADMIN — KETOROLAC TROMETHAMINE 15 MG: 30 INJECTION, SOLUTION INTRAMUSCULAR; INTRAVENOUS at 05:01

## 2024-01-20 NOTE — ED PROVIDER NOTES
Encounter Date: 1/20/2024       History     Chief Complaint   Patient presents with    Fall     Trip and fall down 3 steps. R shoulder R knee, R elbow, R face. Pain greatest in R knee     Paul Fitzgerald is a 38 y.o. female with no significant PMH presenting to [facility] for evaluation of a trip and fall down 3 steps that occurred last night while she was intoxicated at a bar. She states she tripped going down the stairs and fell onto her right knee, right shoulder, and right elbow. States she hit the right side of her forehead but no LOC. Some other patrons at the bar helped her up and helped her to an uber to get her home and into her house where she went to sleep. She has not been able to bear weight on her right knee since the fall. She denies vision changes, headache, nausea, vomiting, jaw pain, neck pain, back pain, chest pain, abdominal pain, hip pain, or any other injuries. No anticoagulation/antiplatelets.         The history is provided by the patient and medical records. No  was used.     Review of patient's allergies indicates:   Allergen Reactions    Penicillins Hives     No past medical history on file.  Past Surgical History:   Procedure Laterality Date    COLPOSCOPY  2019    TONGUE SURGERY      extra skin removed as a small child     Family History   Problem Relation Age of Onset    No Known Problems Father     Cancer Mother     Breast cancer Neg Hx     Colon cancer Neg Hx     Ovarian cancer Neg Hx      Social History     Tobacco Use    Smoking status: Every Day     Current packs/day: 1.00     Types: Cigarettes     Passive exposure: Current    Smokeless tobacco: Never   Substance Use Topics    Alcohol use: Yes     Comment: soc    Drug use: Yes     Types: Marijuana     Review of Systems    Physical Exam     Initial Vitals [01/20/24 1549]   BP Pulse Resp Temp SpO2   123/80 83 18 99.1 °F (37.3 °C) 99 %      MAP       --         Physical Exam    Nursing note and vitals  reviewed.  Constitutional: Vital signs are normal. She appears well-developed and well-nourished. No distress.   HENT:   Head: Normocephalic. Head is without raccoon's eyes and without Luna's sign.   Nose: No sinus tenderness, nasal deformity, septal deviation or nasal septal hematoma.   Mouth/Throat: Uvula is midline, oropharynx is clear and moist and mucous membranes are normal. No lacerations.   No tenderness over maxillary or frontal sinuses, no swelling or ecchymosis.    Eyes: Conjunctivae, EOM and lids are normal. Pupils are equal, round, and reactive to light. No scleral icterus.   Neck: Neck supple.   Normal range of motion.   Full passive range of motion without pain.     Cardiovascular:  Normal rate and regular rhythm.           Pulmonary/Chest: Breath sounds normal. No stridor. No respiratory distress. She exhibits no tenderness, no bony tenderness and no crepitus.   Abdominal: Abdomen is soft. She exhibits no distension. There is no abdominal tenderness.   Musculoskeletal:      Right shoulder: Tenderness present. No swelling or deformity. Normal range of motion.      Left shoulder: No tenderness.      Right elbow: No deformity. Normal range of motion. Tenderness present in lateral epicondyle.      Left elbow: No deformity. No tenderness.      Right forearm: No tenderness.      Left forearm: No tenderness.      Right wrist: No tenderness, bony tenderness or snuff box tenderness.      Left wrist: No tenderness, bony tenderness or snuff box tenderness.      Cervical back: Full passive range of motion without pain, normal range of motion and neck supple. No tenderness or bony tenderness. No spinous process tenderness or muscular tenderness.      Thoracic back: No tenderness or bony tenderness.      Lumbar back: No tenderness or bony tenderness.      Right hip: No tenderness or bony tenderness. Normal range of motion.      Left hip: No tenderness or bony tenderness. Normal range of motion.      Right knee:  Swelling present. No deformity or crepitus. Normal range of motion. Tenderness present over the lateral joint line. No patellar tendon tenderness. No LCL laxity, MCL laxity, ACL laxity or PCL laxity. Normal alignment and normal patellar mobility.      Instability Tests: Anterior drawer test negative. Posterior drawer test negative. Medial Herb test negative and lateral Herb test negative.      Left knee: No swelling, deformity or bony tenderness. No tenderness.      Right ankle: No tenderness.      Left ankle: No tenderness.     Neurological: She is alert and oriented to person, place, and time. She has normal strength. No cranial nerve deficit or sensory deficit. GCS score is 15. GCS eye subscore is 4. GCS verbal subscore is 5. GCS motor subscore is 6.   Skin: Skin is warm and dry.   Psychiatric: She has a normal mood and affect. Thought content normal.         ED Course   Procedures  Labs Reviewed   POCT URINE PREGNANCY          Imaging Results              X-Ray Shoulder Trauma Right (Final result)  Result time 01/20/24 18:12:02      Final result by Stevie Hansen MD (01/20/24 18:12:02)                   Impression:      1. No acute displaced fracture or dislocation of the right shoulder.      Electronically signed by: Stevie Hansen MD  Date:    01/20/2024  Time:    18:12               Narrative:    EXAMINATION:  XR SHOULDER TRAUMA 3 VIEW RIGHT    CLINICAL HISTORY:  Pain in unspecified shoulder    TECHNIQUE:  Three or four views of the right shoulder were performed.    COMPARISON:  None    FINDINGS:  Three views right shoulder.    The right humeral head maintains appropriate relationship with the glenoid.  The acromioclavicular joint is intact.  No acute displaced right rib fracture.  The right lung zones are clear.                                       X-Ray Knee 3 View Right (Final result)  Result time 01/20/24 18:18:24      Final result by Jeff Guerrero MD (01/20/24 18:18:24)                    Impression:      No acute bony findings evident within the right knee.    Moderate suprapatellar bursa effusion.      Electronically signed by: Jeff Guerrero  Date:    01/20/2024  Time:    18:18               Narrative:    EXAMINATION:  XR KNEE 3 VIEW RIGHT    CLINICAL HISTORY:  Pain in unspecified knee    TECHNIQUE:  AP, lateral, and Merchant views of the right knee were performed.    COMPARISON:  None    FINDINGS:  Bones, joint spaces and soft tissues appear intact.  Bipartite patella is noted superior laterally.  There is a moderate suprapatellar bursa effusion.                                       X-Ray Elbow Complete Right (Final result)  Result time 01/20/24 18:14:17      Final result by Prsoper Tomlin MD (01/20/24 18:14:17)                   Impression:      No acute osseous abnormality.    Electronically signed by resident: Eran Okeefe  Date:    01/20/2024  Time:    18:08    Electronically signed by: Prosper Tomlin MD  Date:    01/20/2024  Time:    18:14               Narrative:    EXAMINATION:  XR ELBOW COMPLETE 3 VIEW RIGHT    CLINICAL HISTORY:  Pain in unspecified elbow    TECHNIQUE:  AP, lateral, and oblique views of the right elbow were performed.    COMPARISON:  None    FINDINGS:  No acute fracture or dislocation.  Normal alignment.  Joint spaces are maintained.  Soft tissues are unremarkable.  No elbow joint effusion.                                    X-Rays:   Independently Interpreted Readings:   Other Readings:  No acute displaced fracture or dislocation of right shoulder, elbow, or knee.     Medications   ketorolac injection 15 mg (15 mg Intramuscular Given 1/20/24 3871)     Medical Decision Making  37 y/o presents with right shoulder, right knee, and right elbow pain s/p mechanical trip and fall last night at a bar. Unable to bear weight in right knee.     Xrays without acute abnormalities.   No laxity of right knee on exam, able to tolerate full passive ROM but with some pain. Tender over  lateral joint line. Extensor mechanism intact.     No indication for head/maxface imaging given time since injury, no LOC, no significant tenderness or signs of injury, no deficits, and not on blood thinners.     Given crutches and instructed on use.  Discussed plan for discharge home, supportive care with OTC analgesics and ice/rest/elevation, follow up in orthopedic surgery clinic as soon as possible, and return precautions and patient expressed understanding and agreement. Orthopedic surgery referral placed.     Amount and/or Complexity of Data Reviewed  Labs: ordered. Decision-making details documented in ED Course.  Radiology: ordered.    Risk  Prescription drug management.              Attending Attestation:   Physician Attestation Statement for Resident:  As the supervising MD   Physician Attestation Statement: I have personally seen and examined this patient.   I agree with the above history.  -:   As the supervising MD I agree with the above PE.     As the supervising MD I agree with the above treatment, course, plan, and disposition.                    ED Course as of 01/21/24 0338   Sat Jan 20, 2024   1718 Preg Test, Ur: Negative [BD]      ED Course User Index  [BD] Abiel Starr MD                           Clinical Impression:  Final diagnoses:  [M25.569] Knee pain  [M25.519] Shoulder pain  [M25.529] Elbow pain  [M25.569] Knee pain - fall onto right knee last night with right lateral knee and patellar pain (concern for dislocation vs fracture)          ED Disposition Condition    Discharge Stable          ED Prescriptions    None       Follow-up Information       Follow up With Specialties Details Why Contact Info    Paige Pisano MD Internal Medicine   [address] [facility]  [address]  [facility] [address]  [phone]      [facility] Emergency Medicine   [address] [place] [id number] [phone]    [facility] Orthopedics   [address] [place] [address]  [address]  [facility] [id number]  [phone]             Lora Harvey MD  Resident  01/21/24 0338

## 2024-01-20 NOTE — ED TRIAGE NOTES
"Paul Fitzgerald, a 38 y.o. female presents to the ED w/ complaint of a fall around 2am today, pt denies LOC, stated she "tripped" over steps, hit her head, denies use of blood thinners. Tenderness and limited mobility to right arm and leg.    Patient identifiers verified and correct for   LOC: The patient is awake, alert and aware of environment with an appropriate affect, the patient is oriented x 3 and speaking appropriately.   APPEARANCE: Patient appears comfortable and in no acute distress, patient is clean and well groomed.  SKIN: The skin is warm and dry, color consistent with ethnicity, patient has normal skin turgor and moist mucus membranes, redness to right knee, no other breakdown or bruising noted.   MUSCULOSKELETAL: limited mobility to right arm and right leg, active ROM to left arm and leg.  RESPIRATORY: Airway is open and patent, respirations are spontaneous, patient has a normal effort and rate, no accessory muscle use noted, pt placed on continuous pulse ox with O2 sats noted at 97% on room air.  CARDIAC: Pt placed on cardiac monitor. Patient has a normal rate and regular rhythm, no edema noted, capillary refill < 3 seconds.   GASTRO: Soft and non tender to palpation, no distention noted, normoactive bowel sounds present in all four quadrants. Pt states bowel movements have been regular.  : Pt denies any pain or frequency with urination.  NEURO: Pt opens eyes spontaneously, behavior appropriate to situation, follows commands, facial expression symmetrical, bilateral hand grasp equal and even, purposeful motor response noted, normal sensation in all extremities when touched with a finger.     Triage note:  Chief Complaint   Patient presents with    Fall     Trip and fall down 3 steps. R shoulder R knee, R elbow, R face. Pain greatest in R knee     Review of patient's allergies indicates:   Allergen Reactions    Penicillins Hives     No past medical history on file.   "

## 2024-01-21 NOTE — ED NOTES
Assumed care of patient. Pt remains on continuous pulse ox, and cycling blood pressures. Bed placed in low locked position, side rails up x2, call bell is within reach. Will continue to monitor.

## 2024-01-21 NOTE — DISCHARGE INSTRUCTIONS
Diagnosis: fall with knee and shoulder pain     Tests today showed:   Labs Reviewed   POCT URINE PREGNANCY     X-Ray Shoulder Trauma Right   Final Result      1. No acute displaced fracture or dislocation of the right shoulder.         Electronically signed by: Stevie Hansen MD   Date:    01/20/2024   Time:    18:12      X-Ray Knee 3 View Right   Final Result      No acute bony findings evident within the right knee.      Moderate suprapatellar bursa effusion.         Electronically signed by: Jeff Guerrero   Date:    01/20/2024   Time:    18:18      X-Ray Elbow Complete Right   Final Result      No acute osseous abnormality.      Electronically signed by resident: Eran Okeefe   Date:    01/20/2024   Time:    18:08      Electronically signed by: Prosper Tomlin MD   Date:    01/20/2024   Time:    18:14          Treatments you had today:   Medications   ketorolac injection 15 mg (15 mg Intramuscular Given 1/20/24 1741)       Follow-Up Plan:  - please follow-up with orthopedic surgery as soon as possible.  A referral has been placed on your behalf.  The number is listed in his discharge paperwork for you to call and make an appointment.  - you may use Tylenol and Motrin as needed for pain.  Ice for swelling.  Crutches as directed.  - Follow-up with primary care doctor within 3 - 5 days  - Additional testing and/or evaluation as directed by your primary doctor    Return to the Emergency Department for symptoms including but not limited to: worsening symptoms, shortness of breath or chest pain, vomiting with inability to hold down fluids, fevers greater than 100.4°F, passing out/fainting/unconsciousness, or other concerning symptoms.

## 2024-01-24 ENCOUNTER — TELEPHONE (OUTPATIENT)
Dept: SMOKING CESSATION | Facility: CLINIC | Age: 39
End: 2024-01-24
Payer: COMMERCIAL

## 2024-01-24 NOTE — TELEPHONE ENCOUNTER
[facility]- called patient  for  telephonic appointment. Left message to call back to reschedule [phone] or  [phone].

## 2024-01-29 ENCOUNTER — OFFICE VISIT (OUTPATIENT)
Dept: ORTHOPEDICS | Facility: CLINIC | Age: 39
End: 2024-01-29
Payer: COMMERCIAL

## 2024-01-29 VITALS — BODY MASS INDEX: 33.67 KG/M2 | HEIGHT: 59 IN | WEIGHT: 167 LBS

## 2024-01-29 DIAGNOSIS — S80.01XA CONTUSION OF RIGHT KNEE, INITIAL ENCOUNTER: ICD-10-CM

## 2024-01-29 DIAGNOSIS — M25.561 ACUTE PAIN OF RIGHT KNEE: ICD-10-CM

## 2024-01-29 DIAGNOSIS — M25.511 ACUTE PAIN OF RIGHT SHOULDER: Primary | ICD-10-CM

## 2024-01-29 PROCEDURE — 99204 OFFICE O/P NEW MOD 45 MIN: CPT | Mod: S$GLB,,, | Performed by: PHYSICIAN ASSISTANT

## 2024-01-29 PROCEDURE — 1159F MED LIST DOCD IN RCRD: CPT | Mod: CPTII,S$GLB,, | Performed by: PHYSICIAN ASSISTANT

## 2024-01-29 PROCEDURE — 1160F RVW MEDS BY RX/DR IN RCRD: CPT | Mod: CPTII,S$GLB,, | Performed by: PHYSICIAN ASSISTANT

## 2024-01-29 PROCEDURE — 99999 PR PBB SHADOW E&M-EST. PATIENT-LVL III: CPT | Mod: PBBFAC,,, | Performed by: PHYSICIAN ASSISTANT

## 2024-01-29 PROCEDURE — 3008F BODY MASS INDEX DOCD: CPT | Mod: CPTII,S$GLB,, | Performed by: PHYSICIAN ASSISTANT

## 2024-01-29 NOTE — PROGRESS NOTES
SUBJECTIVE:     Chief Complaint & History of Present Illness:  Paul Fitzgerald is a 38 y.o. year old female who presents today with constant right shoulder pain that started after a fall about one week ago.  She is Right hand dominant.  The pain is located in the  lateral aspect of the shoulder.  The pain is described as achy.  It is aggravated by reaching, lifting, overhead activity.  Associated symptoms include weakness.  Previous treatments include rest, tylenol, heat which have provided minimal relief.  There is not a history of previous injury or surgery to the shoulder.      Right knee also injured from her fall.  She states the pain has improved. She still has pain with moving from seated to standing position. She had significant swelling initially.  Most of the pain is in the anterior aspect of the knee. She does have buckling.  She has been ambulating without support.    Review of patient's allergies indicates:   Allergen Reactions    Penicillins Hives         Current Outpatient Medications   Medication Sig Dispense Refill    benzonatate (TESSALON PERLES) 100 MG capsule Take 1 capsule (100 mg total) by mouth every 6 (six) hours as needed for Cough. 30 capsule 1    copper intrauterine device (PARAGARD T 380A) 380 square mm IUD 1 each by Intrauterine route.      desonide (DESOWEN) 0.05 % cream Apply topically 2 (two) times daily.      dextromethorphan-guaifenesin  mg (MUCINEX DM)  mg per 12 hr tablet Take 1 tablet by mouth every 12 (twelve) hours.      fexofenadine (ALLEGRA) 60 MG tablet Take 60 mg by mouth once daily.      fluticasone propionate (FLONASE) 50 mcg/actuation nasal spray 1 spray (50 mcg total) by Each Nostril route once daily. 9.9 mL 0    modafiniL (PROVIGIL) 100 MG Tab Take 1 tablet (100 mg total) by mouth daily as needed. 30 tablet 5    nicotine (NICODERM CQ) 21 mg/24 hr Place 1 patch onto the skin once daily. 28 patch 3    nicotine (NICODERM CQ) 21 mg/24 hr Place 1 patch onto  the skin once daily. 28 patch 0     No current facility-administered medications for this visit.       No past medical history on file.    Past Surgical History:   Procedure Laterality Date    COLPOSCOPY  2019    TONGUE SURGERY      extra skin removed as a small child       Review of Systems:  ROS:  Constitutional: no fever or chills  Eyes: no visual changes  ENT: no nasal congestion or sore throat  Respiratory: no cough or shortness of breath  Musculoskeletal: no arthralgias or myalgias      OBJECTIVE:     PHYSICAL EXAM:    General: Weight: 75.8 kg (167 lb) Body mass index is 34.3 kg/m².  Patient is alert, awake and oriented to time, place and person. Mood and affect are appropriate.  Patient does not appear to be in any distress, denies any constitutional symptoms and appears stated age.   HEENT: Pupils are equal and round, sclera are not injected. External examination of ears and nose reveals no abnormalities. Cranial nerves II-X are grossly intact  Neck: examination demonstrates painless  active range of motion. Spurling's sign is negative  Skin: no rashes, abrasions or open wounds on the affected extremity   Resp: No respiratory distress or audible wheezing   Psych:  normal mood and behavior  CV: 2+  pulses, all extremities warm and well perfused   Right Shoulder   Skin intact  No warmth or effusion  Tenderness: lateral acromial  Range of motion is painful   ROM:  passive/100 active, ER 40, IR hip  Shoulder Strength: biceps 5/5, triceps 5/5, abduction 4/5, adduction 5/5  positive for impingement sign, sensory exam normal, and motor exam normal  Special Tests:    Crossbody test: negative    Neer's positive  Hawkin's positive    Mina's positive  Drop arm negative    Right knee  Skin intact  No warmth  ROM 0-120  Stable to testing  4/5 quad, 5/5 hamstring  TTP anterior knee  Mild effusion      IMAGING:  X-rays of the right shoulder, personally reviewed by me, demonstrate well maintained joint space.  No  fracture or dislocation.     X-rays of the right knee, personally reviewed by me, demonstrate mild DJD, bipartite patella.  No fracture or dislocation.     ASSESSMENT     1. Acute pain of right shoulder    2. Acute pain of right knee    3. Contusion of right knee, initial encounter        PLAN:     Discussed with the patient at length all the different treatment options available for her right shoulder including anti-inflammatories, acetaminophen, rest, ice, Physical therapy, occasional cortisone injections for temporary relief, and shoulder arthroscopy    - Right knee- injury with instability and buckling.  MRI right knee to rule out ligamentous injury  - Right shoulder injury- weakness on exam concerning for RTC injury.  MRI ordered to evaluate  - will call with results

## 2024-02-08 ENCOUNTER — HOSPITAL ENCOUNTER (OUTPATIENT)
Dept: RADIOLOGY | Facility: OTHER | Age: 39
Discharge: HOME | End: 2024-02-08
Attending: PHYSICIAN ASSISTANT
Payer: COMMERCIAL

## 2024-02-08 ENCOUNTER — TELEPHONE (OUTPATIENT)
Dept: ORTHOPEDICS | Facility: CLINIC | Age: 39
End: 2024-02-08
Payer: COMMERCIAL

## 2024-02-08 DIAGNOSIS — S80.01XA CONTUSION OF RIGHT KNEE, INITIAL ENCOUNTER: ICD-10-CM

## 2024-02-08 DIAGNOSIS — M25.511 ACUTE PAIN OF RIGHT SHOULDER: ICD-10-CM

## 2024-02-08 PROCEDURE — 73221 MRI JOINT UPR EXTREM W/O DYE: CPT | Mod: TC,RT

## 2024-02-08 PROCEDURE — 73721 MRI JNT OF LWR EXTRE W/O DYE: CPT | Mod: 26,RT,, | Performed by: RADIOLOGY

## 2024-02-08 PROCEDURE — 73221 MRI JOINT UPR EXTREM W/O DYE: CPT | Mod: 26,RT,, | Performed by: INTERNAL MEDICINE

## 2024-02-08 PROCEDURE — 73721 MRI JNT OF LWR EXTRE W/O DYE: CPT | Mod: TC,RT

## 2024-02-12 ENCOUNTER — TELEPHONE (OUTPATIENT)
Dept: ORTHOPEDICS | Facility: CLINIC | Age: 39
End: 2024-02-12
Payer: COMMERCIAL

## 2024-02-12 RX ORDER — MELOXICAM 15 MG/1
15 TABLET ORAL DAILY
Qty: 30 TABLET | Refills: 1 | Status: SHIPPED | OUTPATIENT
Start: 2024-02-12

## 2024-02-12 NOTE — TELEPHONE ENCOUNTER
Called patient to discuss MRI results  Will have her see sports for her shoulder  Right knee- doing ok.  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 Recommend continue RICE, ROM as tolerated, FWBAT  Meloxicam prescription sent

## 2024-02-18 NOTE — PROGRESS NOTES
CC: Right shoulder and right knee pain - the patient was referred for her right shoulder but requested to be seen for her knee as well.     38 y.o. Female presents as a new patient to me. Patient is right hand dominant. She  works as an , self-employed. Complaint is right shoulder pain x 1 month after a fall in January 20th, 2024. She reports she fell down 3 stairs and landed directly onto the shoulder. She was seen in the ED on 1/20/24 and subsequently follows up for discussion of MRI results which were ordered.  She also reports knee pain after the fall and comes today with an MRI of her knee as well.  She denies any significant prior issues with the right shoulder.  She does state that the shoulder feels better, approximately 80% since her date of injury still has some ongoing pain with repetitive overhead activity.  She does feel that the shoulder feels just a bit better with movement.  She denies any instability event.  Pain localizes anterolateral.  She describes subjective weakness as well.  Additionally the right knee pain is localized anterior around the patella.  It sounds like she may have fallen on her flexed right knee.  Her knee pain is getting better.  Really present mostly with direct pressure but also feels that the knee interesting gets better with ambulation and activity.  She states that her friend is a chiropractor and told her he that she had a patella variant morphology from xrays taken years ago.  She does state that it has been difficult for her to work due to pain in her right shoulder and right knee.  Pain is not disruptive to sleep at night. Better with rest. Denies neck pain or radicular symptoms. Treatment thus far has included activity modifications, rest, and oral medication.  Here today to discuss diagnosis and treatment options.     Referred by colleague MACKENZIE Funk.    BMI 35  Positive for tobacco. 1ppd smoker.   Negative for diabetes.    PAST MEDICAL HISTORY:    History reviewed. No pertinent past medical history.    PAST SURGICAL HISTORY:  Past Surgical History:   Procedure Laterality Date    COLPOSCOPY  2019    TONGUE SURGERY      extra skin removed as a small child     FAMILY HISTORY:  Family History   Problem Relation Age of Onset    No Known Problems Father     Cancer Mother     Breast cancer Neg Hx     Colon cancer Neg Hx     Ovarian cancer Neg Hx      MEDICATIONS:    Current Outpatient Medications:     benzonatate (TESSALON PERLES) 100 MG capsule, Take 1 capsule (100 mg total) by mouth every 6 (six) hours as needed for Cough., Disp: 30 capsule, Rfl: 1    copper intrauterine device (PARAGARD T 380A) 380 square mm IUD, 1 each by Intrauterine route., Disp: , Rfl:     desonide (DESOWEN) 0.05 % cream, Apply topically 2 (two) times daily., Disp: , Rfl:     dextromethorphan-guaifenesin  mg (MUCINEX DM)  mg per 12 hr tablet, Take 1 tablet by mouth every 12 (twelve) hours., Disp: , Rfl:     fexofenadine (ALLEGRA) 60 MG tablet, Take 60 mg by mouth once daily., Disp: , Rfl:     fluticasone propionate (FLONASE) 50 mcg/actuation nasal spray, 1 spray (50 mcg total) by Each Nostril route once daily., Disp: 9.9 mL, Rfl: 0    meloxicam (MOBIC) 15 MG tablet, Take 1 tablet (15 mg total) by mouth once daily., Disp: 30 tablet, Rfl: 1    modafiniL (PROVIGIL) 100 MG Tab, Take 1 tablet (100 mg total) by mouth daily as needed., Disp: 30 tablet, Rfl: 5    nicotine (NICODERM CQ) 21 mg/24 hr, Place 1 patch onto the skin once daily., Disp: 28 patch, Rfl: 3    nicotine (NICODERM CQ) 21 mg/24 hr, Place 1 patch onto the skin once daily., Disp: 28 patch, Rfl: 0    ALLERGIES:  Review of patient's allergies indicates:   Allergen Reactions    Penicillins Hives     REVIEW OF SYSTEMS:  Constitution: Negative. Negative for chills, fever and night sweats.    Hematologic/Lymphatic: Negative for bleeding problem. Does not bruise/bleed easily.   Skin: Negative for dry skin, itching and rash.  "  Musculoskeletal: Negative for falls. Positive for right shoulder pain and muscle weakness.     All other review of symptoms were reviewed and found to be noncontributory.     PHYSICAL EXAMINATION:  Vitals:  /63   Pulse 86   Ht 4' 10" (1.473 m)   Wt 75.8 kg (167 lb 1.7 oz)   BMI 34.93 kg/m²    General: Well-developed well-nourished 38 y.o. femalein no acute distress   Cardiovascular: Regular rhythm by palpation of distal pulse, normal color and temperature, no concerning varicosities on symptomatic side   Lungs: No labored breathing or wheezing appreciated   Neuro: Alert and oriented ×3   Psychiatric: well oriented to person, place and time, demonstrates normal mood and affect   Skin: No rashes, lesions or ulcers, normal temperature, turgor, and texture on uninvolved extremity    Ortho/SPM Exam  Examination of the right shoulder demonstrates demonstrates no signs of trauma.  She does have some tenderness and pain to palpation over the proximal biceps groove and Codman's point.  Negative AC joint.  Positive modified speed's test.  4 to 4+ out of 5 resisted scaption with some limitation due to pain.  5- out of 5 resisted external rotation with arm at side.  Intact belly press test.  Stable shoulder.  Negative anterior apprehension.  Stable to load and shift testing.  No pain over the posterior glenohumeral joint line.  Intact cervical neck range of motion.  Negative Spurling's maneuver.    Exam of the right knee shows some tenderness and pain to palpation over the anterior patella and lateral patellar facet.  Intact extensor mechanism.  Minimal lag.  Flexion to 130° with some mild pain at terminal range.  Mild tenderness along the lateral joint line.  Nontender over the medial joint line.  Negative Herb's maneuver.  Negative Lachman's.  Negative posterior drawer.  Stable to varus and valgus stress.    IMAGING:  Xrays including AP, Outlet and Axillary Lateral of RIGHT shoulder are ordered / images " reviewed by me:   Double density ossicle seen on the scapular Y-view around the anterior greater tuberosity cuff footprint and biceps groove.  Otherwise no other fracture seen.    Prior Right knee x-rays to include AP, lateral and axial views demonstrate evidence of findings consistent with a suspected bipartite variant morphology of the patella.     MRI of RIGHT shoulder reviewed by me and discussed with patient. Study shows:   High-grade partial to full-thickness tear of supraspinatus.     Intra-articular biceps tendinopathy.     Subacromial subdeltoid bursitis.     Bone marrow contusion involving the greater tuberosity.    On my read:  There appears to be a small osseous avulsion segment of the injury adjacent to the anterior supraspinatus cable region.  This appears to be minimally displaced.  Discussed with my [facility] colleague who agrees.  Extensive edema within the greater tuberosity and humeral head region.    MRI right knee:  Radiology report: Findings are concerning for a lateral patellar fracture. There is a patellar variant that has this appearance but there is marrow edema and a joint effusion suggesting that this may be a patellar fracture. No other internal derangement seen.     On my read there certainly is some edema around the patella and area of suspected bipartite variant morphology.  No internal derangement seen.    ASSESSMENT:      ICD-10-CM ICD-9-CM   1. Traumatic incomplete tear of right rotator cuff, initial encounter  S46.011A 840.4   2. Chronic right shoulder pain  M25.511 719.41    G89.29 338.29   3. Contusion of right knee, initial encounter  S80.01XA 924.11   4. Bipartite patella  Q74.1 755.64   5. Chronic pain of right knee  M25.561 719.46    G89.29 338.29       PLAN:     -Findings and treatment options were discussed with the patient.  In regards to her right shoulder imaging shows an extensive amount of edema within the humeral head and greater tuberosity region with some  evidence of an avulsion fracture potentially of the anterior supraspinatus tendon.  I would like to get a CT scan for further assessment of this injury pattern.  Overall the shoulder is feeling much better.  We can begin some gentle range of motion therapy.  No resisted activity for now.  No heavy lifting.  In regards to her right - by history she has bipartite variant morphology and there are some sclerotic changes around the patellar defect seen on imaging which would suggest that she does have a bipartite variant.  With that said, the MRI clearly shows a good amount of edema around the patella.  Perhaps contusion of pre-existing variant morphology.  Nonetheless this would be a vertical type defect within the patella and not something that would be functionally do concerning.  Conservative care recommended.  She does have a minimal lag and I think would benefit from some physical therapy.  I expect this to get better with time as it has done.  I do not think we need to put her in a brace.  -PT ([facility])  -CT Right Shoulder  -RTC for virutal f/u after CT, and 6 wks in person to check on her right shoulder and right knee.  -All questions answered    Procedures

## 2024-02-19 ENCOUNTER — OFFICE VISIT (OUTPATIENT)
Dept: SPORTS MEDICINE | Facility: CLINIC | Age: 39
End: 2024-02-19
Payer: COMMERCIAL

## 2024-02-19 ENCOUNTER — HOSPITAL ENCOUNTER (OUTPATIENT)
Dept: RADIOLOGY | Facility: HOSPITAL | Age: 39
Discharge: HOME | End: 2024-02-19
Attending: ORTHOPAEDIC SURGERY
Payer: COMMERCIAL

## 2024-02-19 VITALS
HEART RATE: 86 BPM | BODY MASS INDEX: 35.08 KG/M2 | HEIGHT: 58 IN | SYSTOLIC BLOOD PRESSURE: 112 MMHG | DIASTOLIC BLOOD PRESSURE: 63 MMHG | WEIGHT: 167.13 LBS

## 2024-02-19 DIAGNOSIS — M25.511 CHRONIC RIGHT SHOULDER PAIN: ICD-10-CM

## 2024-02-19 DIAGNOSIS — G89.29 CHRONIC RIGHT SHOULDER PAIN: ICD-10-CM

## 2024-02-19 DIAGNOSIS — S80.01XA CONTUSION OF RIGHT KNEE, INITIAL ENCOUNTER: ICD-10-CM

## 2024-02-19 DIAGNOSIS — M25.511 RIGHT SHOULDER PAIN, UNSPECIFIED CHRONICITY: ICD-10-CM

## 2024-02-19 DIAGNOSIS — M25.561 CHRONIC PAIN OF RIGHT KNEE: ICD-10-CM

## 2024-02-19 DIAGNOSIS — S46.011A TRAUMATIC INCOMPLETE TEAR OF RIGHT ROTATOR CUFF, INITIAL ENCOUNTER: Primary | ICD-10-CM

## 2024-02-19 DIAGNOSIS — G89.29 CHRONIC PAIN OF RIGHT KNEE: ICD-10-CM

## 2024-02-19 DIAGNOSIS — Q74.1 BIPARTITE PATELLA: ICD-10-CM

## 2024-02-19 PROCEDURE — 73030 X-RAY EXAM OF SHOULDER: CPT | Mod: 26,RT,, | Performed by: RADIOLOGY

## 2024-02-19 PROCEDURE — 99205 OFFICE O/P NEW HI 60 MIN: CPT | Mod: S$GLB,,, | Performed by: ORTHOPAEDIC SURGERY

## 2024-02-19 PROCEDURE — 3008F BODY MASS INDEX DOCD: CPT | Mod: CPTII,S$GLB,, | Performed by: ORTHOPAEDIC SURGERY

## 2024-02-19 PROCEDURE — 99999 PR PBB SHADOW E&M-EST. PATIENT-LVL IV: CPT | Mod: PBBFAC,,, | Performed by: ORTHOPAEDIC SURGERY

## 2024-02-19 PROCEDURE — 3078F DIAST BP <80 MM HG: CPT | Mod: CPTII,S$GLB,, | Performed by: ORTHOPAEDIC SURGERY

## 2024-02-19 PROCEDURE — 73030 X-RAY EXAM OF SHOULDER: CPT | Mod: TC,RT

## 2024-02-19 PROCEDURE — 1159F MED LIST DOCD IN RCRD: CPT | Mod: CPTII,S$GLB,, | Performed by: ORTHOPAEDIC SURGERY

## 2024-02-19 PROCEDURE — 3074F SYST BP LT 130 MM HG: CPT | Mod: CPTII,S$GLB,, | Performed by: ORTHOPAEDIC SURGERY

## 2024-02-23 ENCOUNTER — HOSPITAL ENCOUNTER (OUTPATIENT)
Dept: RADIOLOGY | Facility: HOSPITAL | Age: 39
Discharge: HOME | End: 2024-02-23
Attending: ORTHOPAEDIC SURGERY
Payer: COMMERCIAL

## 2024-02-23 DIAGNOSIS — G89.29 CHRONIC RIGHT SHOULDER PAIN: ICD-10-CM

## 2024-02-23 DIAGNOSIS — M25.511 CHRONIC RIGHT SHOULDER PAIN: ICD-10-CM

## 2024-02-23 PROCEDURE — 73200 CT UPPER EXTREMITY W/O DYE: CPT | Mod: TC,RT

## 2024-02-23 PROCEDURE — 73200 CT UPPER EXTREMITY W/O DYE: CPT | Mod: 26,RT,, | Performed by: RADIOLOGY

## 2024-02-26 NOTE — PROGRESS NOTES
Telemedicine/Virtual Visit Documentation:     The patient location is: home    The chief complaint leading to consultation is: see HPI    VISIT TYPE X   Virtual visit with synchronous audio and video x   Telephone E/M service      Total time spent with patient: see X yane on chart below.   More than half of the time was spent counseling or coordinating care including prognosis, differential diagnosis, risks and benefits of treatment, instructions, compliance risk reductions     EST MINUTES X   94919 5    16893 10    04479 15 x   99214 25    55841 40    NEW     68771 10    72535 20    75953 30    64211 45    95375 60    PHONE      5-10    77304 11-20    67625 21-30      H&P  Orthopaedics      SUBJECTIVE:     History of Present Illness:    Paul Fitzgerald who presents for CT review of right shoulder. Concern for avulsion fracture potentially of the anterior supraspinatus tendon. Patient does not report any new incidents or injuries since their last appointment. Pain and symptoms remain unchanged since his last appointment. Here today to discuss treatment options.     Prior Hx 2/19/2024:  38 y.o. Female presents as a new patient to me. Patient is right hand dominant. She  works as an , self-employed. Complaint is right shoulder pain x 1 month after a fall in January 20th, 2024. She reports she fell down 3 stairs and landed directly onto the shoulder. She was seen in the ED on 1/20/24 and subsequently follows up for discussion of MRI results which were ordered.  She also reports knee pain after the fall and comes today with an MRI of her knee as well.  She denies any significant prior issues with the right shoulder.  She does state that the shoulder feels better, approximately 80% since her date of injury still has some ongoing pain with repetitive overhead activity.  She does feel that the shoulder feels just a bit better with movement.  She denies any instability event.  Pain localizes anterolateral.   She describes subjective weakness as well.  Additionally the right knee pain is localized anterior around the patella.  It sounds like she may have fallen on her flexed right knee.  Her knee pain is getting better.  Really present mostly with direct pressure but also feels that the knee interesting gets better with ambulation and activity.  She states that her friend is a chiropractor and told her he that she had a patella variant morphology from xrays taken years ago.  She does state that it has been difficult for her to work due to pain in her right shoulder and right knee.  Pain is not disruptive to sleep at night. Better with rest. Denies neck pain or radicular symptoms. Treatment thus far has included activity modifications, rest, and oral medication.  Here today to discuss diagnosis and treatment options.      Referred by colleague MACKENZIE Funk.     BMI 35  Positive for tobacco. 1ppd smoker.   Negative for diabetes.     Review of patient's allergies indicates:   Allergen Reactions    Penicillins Hives     No past medical history on file.  Past Surgical History:   Procedure Laterality Date    COLPOSCOPY  2019    TONGUE SURGERY      extra skin removed as a small child     Family History   Problem Relation Age of Onset    No Known Problems Father     Cancer Mother     Breast cancer Neg Hx     Colon cancer Neg Hx     Ovarian cancer Neg Hx      Social History     Tobacco Use    Smoking status: Every Day     Current packs/day: 1.00     Types: Cigarettes     Passive exposure: Current    Smokeless tobacco: Never   Substance Use Topics    Alcohol use: Yes     Comment: soc    Drug use: Yes     Types: Marijuana      Review of Systems:  Patient denies constitutional symptoms, cardiac symptoms, respiratory symptoms, GI symptoms.  The remainder of the musculoskeletal ROS is included in the HPI.    OBJECTIVE:     Physical Exam:  Gen:  No acute distress  CV:  Peripherally well-perfused.  Pulses 2+ bilaterally.  Lungs:   Normal respiratory effort.  Abdomen:  Soft, non-tender, non-distended  Head/Neck:  Normocephalic.  Atraumatic. No TTP, AROM and PROM intact without pain  Neuro:  CN intact without deficit, SILT throughout B/L Upper & Lower Extremities    [facility]:  CT right shoulder reviewed by me and discussed with patient. Study shows:   No fracture.     Rotator cuff muscle bulk is maintained.     Janeth fissural micro nodules in the right lung.  For a solid nodule <6 mm, Fleischner Society 2017 guidelines recommend no routine follow up for a low risk patient, or follow-up with non-contrast chest CT at 12 months in a high risk patient.    MRI right shoulder:  MRI of RIGHT shoulder reviewed by me and discussed with patient. Study shows:   High-grade partial to full-thickness tear of supraspinatus.     Intra-articular biceps tendinopathy.     Subacromial subdeltoid bursitis.     Bone marrow contusion involving the greater tuberosity.     ASSESSMENT/PLAN:     A/P: Paul Fitzgerald is a 38 y.o. with small full-thickness tear of the supraspinatus tendon, traumatic onset with the intra-articular biceps tendinopathy    Plan:  I reviewed CT imaging with the patient.  No fracture seen on that scan.  Previous MRI showed a near complete to complete tear of the supraspinatus tendon.  Again this was acute onset with injury.  Given her age we have discussed options and I do think her most reliable and durable treatment option would be to proceed with surgery.  As before we have discussed considerations for nonoperative treatment initially with therapy and we can see how she does with this but I do have concerns given her age in the traumatic onset of this that she will have continued or recurrent symptoms down the road.  We discussed the details of arthroscopic rotator cuff repair and biceps tenodesis to include the expected postop rehab and recovery course.  Outlined risks include but are not limited to tissue nonhealing re-tear, biceps deformity,  stiffness, weakness, and potential need for further surgery.  I expect her to do well.  She had some questions regarding the cost of the procedure and we provided her with information in regards to the central pricing office in the anticipated CPT codes.  She will let us know should she wish to proceed with the surgery.  Continue physical therapy for now for the shoulder and knee.    We did discuss the significance of her underlying tobacco use as it relates to tissue healing.  Recommended cessation.  She is at higher risk in this case for tissue nonhealing re-tear given the tobacco use.    Tentative plan for right shoulder arthroscopic rotator cuff repair, biceps tenodesis, possible subacromial decompression.

## 2024-02-27 ENCOUNTER — OFFICE VISIT (OUTPATIENT)
Dept: SPORTS MEDICINE | Facility: CLINIC | Age: 39
End: 2024-02-27
Payer: COMMERCIAL

## 2024-02-27 ENCOUNTER — PATIENT MESSAGE (OUTPATIENT)
Dept: SPORTS MEDICINE | Facility: CLINIC | Age: 39
End: 2024-02-27

## 2024-02-27 DIAGNOSIS — S46.011A TRAUMATIC COMPLETE TEAR OF RIGHT ROTATOR CUFF, INITIAL ENCOUNTER: Primary | ICD-10-CM

## 2024-02-27 DIAGNOSIS — M67.921 BICEPS TENDINOPATHY OF RIGHT UPPER EXTREMITY: ICD-10-CM

## 2024-02-27 PROCEDURE — 99213 OFFICE O/P EST LOW 20 MIN: CPT | Mod: 95,,, | Performed by: ORTHOPAEDIC SURGERY

## 2024-02-28 ENCOUNTER — TELEPHONE (OUTPATIENT)
Dept: SPORTS MEDICINE | Facility: CLINIC | Age: 39
End: 2024-02-28
Payer: COMMERCIAL

## 2024-02-28 ENCOUNTER — TELEPHONE (OUTPATIENT)
Dept: SMOKING CESSATION | Facility: CLINIC | Age: 39
End: 2024-02-28
Payer: COMMERCIAL

## 2024-02-28 NOTE — TELEPHONE ENCOUNTER
[facility]- called patient for previous missed appointment. Left message to call back to reschedule [phone] or [phone].

## 2024-02-28 NOTE — TELEPHONE ENCOUNTER
----- Message from Emma Kimble sent at 2/28/2024  9:53 AM CST -----  Pt calling to schedule her procedure date       Confirmed patient's contact info below:  Contact Name: Paul Fitzgerald  Phone Number: [phone]

## 2024-03-04 ENCOUNTER — OFFICE VISIT (OUTPATIENT)
Dept: SPORTS MEDICINE | Facility: CLINIC | Age: 39
End: 2024-03-04
Payer: COMMERCIAL

## 2024-03-04 VITALS
DIASTOLIC BLOOD PRESSURE: 65 MMHG | WEIGHT: 167.69 LBS | HEART RATE: 85 BPM | BODY MASS INDEX: 35.2 KG/M2 | SYSTOLIC BLOOD PRESSURE: 92 MMHG | HEIGHT: 58 IN

## 2024-03-04 DIAGNOSIS — M75.21 BICEPS TENDINITIS OF RIGHT UPPER EXTREMITY: ICD-10-CM

## 2024-03-04 DIAGNOSIS — M67.921 BICEPS TENDINOPATHY OF RIGHT UPPER EXTREMITY: ICD-10-CM

## 2024-03-04 DIAGNOSIS — S46.019A TRAUMATIC ROTATOR CUFF TEAR, INITIAL ENCOUNTER: Primary | ICD-10-CM

## 2024-03-04 DIAGNOSIS — S46.011A TRAUMATIC COMPLETE TEAR OF RIGHT ROTATOR CUFF, INITIAL ENCOUNTER: Primary | ICD-10-CM

## 2024-03-04 DIAGNOSIS — S46.011D TRAUMATIC ROTATOR CUFF TEAR, RIGHT, SUBSEQUENT ENCOUNTER: ICD-10-CM

## 2024-03-04 PROCEDURE — 99499 UNLISTED E&M SERVICE: CPT | Mod: S$GLB,,, | Performed by: PHYSICIAN ASSISTANT

## 2024-03-04 PROCEDURE — 99999 PR PBB SHADOW E&M-EST. PATIENT-LVL IV: CPT | Mod: PBBFAC,,, | Performed by: PHYSICIAN ASSISTANT

## 2024-03-04 RX ORDER — OXYCODONE HYDROCHLORIDE 5 MG/1
5-10 TABLET ORAL
Qty: 28 TABLET | Refills: 0 | Status: SHIPPED | OUTPATIENT
Start: 2024-03-04

## 2024-03-04 RX ORDER — CEFAZOLIN SODIUM 2 G/50ML
2 SOLUTION INTRAVENOUS
Status: CANCELLED | OUTPATIENT
Start: 2024-03-04

## 2024-03-04 RX ORDER — ASPIRIN 81 MG/1
81 TABLET ORAL 2 TIMES DAILY
Qty: 28 TABLET | Refills: 0 | Status: SHIPPED | OUTPATIENT
Start: 2024-03-04 | End: 2024-03-27 | Stop reason: SDUPTHER

## 2024-03-04 RX ORDER — SODIUM CHLORIDE 9 MG/ML
INJECTION, SOLUTION INTRAVENOUS CONTINUOUS
Status: CANCELLED | OUTPATIENT
Start: 2024-03-04

## 2024-03-04 RX ORDER — ONDANSETRON 4 MG/1
4 TABLET, ORALLY DISINTEGRATING ORAL EVERY 8 HOURS PRN
Qty: 30 TABLET | Refills: 0 | Status: SHIPPED | OUTPATIENT
Start: 2024-03-04

## 2024-03-04 NOTE — H&P (VIEW-ONLY)
Paul Fitzgerald  is here for a completion of her perioperative paperwork. she  Is scheduled to undergo right shoulder arthroscopic rotator cuff repair, biceps tenodesis, possible subacromial decompression  on 3/12/2024.  She is a healthy individual and does not need clearance for this procedure.     Risks, indications and benefits of the surgical procedure were discussed with the patient. All questions with regard to surgery, rehab, expected return to functional activities, activities of daily living and recreational endeavors were answered to her satisfaction.    Discussed COVID-19 with the patient, they are aware of our current policies and procedures, were given the option of delaying surgery, and they elect to proceed.    Patient was informed and understands the risks of surgery are greater for patients with a current condition or hx of heart disease, obesity, clotting disorders, recurrent infections, steroid use, current or past smoking, and factors such as sedentary lifestyle and noncompliance with medications, therapy or f/u. The degree of the increased risk is hard to estimate w/ any degree of precision.    Once no other questions were asked, a brief history and physical exam was then performed.    PAST MEDICAL HISTORY: History reviewed. No pertinent past medical history.  PAST SURGICAL HISTORY:   Past Surgical History:   Procedure Laterality Date    COLPOSCOPY  2019    TONGUE SURGERY      extra skin removed as a small child     FAMILY HISTORY:   Family History   Problem Relation Age of Onset    No Known Problems Father     Cancer Mother     Breast cancer Neg Hx     Colon cancer Neg Hx     Ovarian cancer Neg Hx      SOCIAL HISTORY:   Social History     Socioeconomic History    Marital status: Single   Tobacco Use    Smoking status: Every Day     Current packs/day: 1.00     Types: Cigarettes     Passive exposure: Current    Smokeless tobacco: Never   Substance and Sexual Activity    Alcohol use: Yes      Comment: soc    Drug use: Yes     Types: Marijuana    Sexual activity: Yes     Partners: Male     Birth control/protection: Condom, I.U.D.     Social Determinants of Health     Financial Resource Strain: Low Risk  (2/27/2024)    Overall Financial Resource Strain (CARDIA)     Difficulty of Paying Living Expenses: Not hard at all   Food Insecurity: No Food Insecurity (2/27/2024)    Hunger Vital Sign     Worried About Running Out of Food in the Last Year: Never true     Ran Out of Food in the Last Year: Never true   Transportation Needs: No Transportation Needs (2/27/2024)    PRAPARE - Transportation     Lack of Transportation (Medical): No     Lack of Transportation (Non-Medical): No   Physical Activity: Insufficiently Active (2/27/2024)    Exercise Vital Sign     Days of Exercise per Week: 4 days     Minutes of Exercise per Session: 20 min   Stress: Stress Concern Present (2/27/2024)    [employer] [place] [employer] - Occupational Stress Questionnaire     Feeling of Stress : To some extent   Social Connections: Unknown (2/27/2024)    Social Connection and Isolation Panel [NHANES]     Frequency of Communication with Friends and Family: More than three times a week     Frequency of Social Gatherings with Friends and Family: Twice a week     Active Member of Clubs or Organizations: No     Attends Club or Organization Meetings: Never     Marital Status: Never    Housing Stability: Low Risk  (2/27/2024)    Housing Stability Vital Sign     Unable to Pay for Housing in the Last Year: No     Number of Places Lived in the Last Year: 1     Unstable Housing in the Last Year: No       MEDICATIONS:   Current Outpatient Medications:     benzonatate (TESSALON PERLES) 100 MG capsule, Take 1 capsule (100 mg total) by mouth every 6 (six) hours as needed for Cough., Disp: 30 capsule, Rfl: 1    copper intrauterine device (PARAGARD T 380A) 380 square mm IUD, 1 each by Intrauterine route., Disp: , Rfl:     desonide  (DESOWEN) 0.05 % cream, Apply topically 2 (two) times daily., Disp: , Rfl:     dextromethorphan-guaifenesin  mg (MUCINEX DM)  mg per 12 hr tablet, Take 1 tablet by mouth every 12 (twelve) hours., Disp: , Rfl:     fexofenadine (ALLEGRA) 60 MG tablet, Take 60 mg by mouth once daily., Disp: , Rfl:     fluticasone propionate (FLONASE) 50 mcg/actuation nasal spray, 1 spray (50 mcg total) by Each Nostril route once daily., Disp: 9.9 mL, Rfl: 0    meloxicam (MOBIC) 15 MG tablet, Take 1 tablet (15 mg total) by mouth once daily., Disp: 30 tablet, Rfl: 1    modafiniL (PROVIGIL) 100 MG Tab, Take 1 tablet (100 mg total) by mouth daily as needed., Disp: 30 tablet, Rfl: 5    nicotine (NICODERM CQ) 21 mg/24 hr, Place 1 patch onto the skin once daily., Disp: 28 patch, Rfl: 3    nicotine (NICODERM CQ) 21 mg/24 hr, Place 1 patch onto the skin once daily., Disp: 28 patch, Rfl: 0  ALLERGIES:   Review of patient's allergies indicates:   Allergen Reactions    Penicillins Hives       Review of Systems   Constitution: Negative. Negative for chills, fever and night sweats.   HENT: Negative for congestion and headaches.    Eyes: Negative for blurred vision, left vision loss and right vision loss.   Cardiovascular: Negative for chest pain and syncope.   Respiratory: Negative for cough and shortness of breath.    Endocrine: Negative for polydipsia, polyphagia and polyuria.   Hematologic/Lymphatic: Negative for bleeding problem. Does not bruise/bleed easily.   Skin: Negative for dry skin, itching and rash.   Musculoskeletal: Negative for falls and muscle weakness.   Gastrointestinal: Negative for abdominal pain and bowel incontinence.   Genitourinary: Negative for bladder incontinence and nocturia.   Neurological: Negative for disturbances in coordination, loss of balance and seizures.   Psychiatric/Behavioral: Negative for depression. The patient does not have insomnia.    Allergic/Immunologic: Negative for hives and persistent  infections.     PHYSICAL EXAM:  GEN: A&Ox3, WD WN NAD  HEENT: WNL  CHEST: CTAB, no W/R/R  HEART: RRR, no M/R/G   ABD: Soft, NT ND, BS x4 QUADS  MS: Refer to previous note for detailed MS exam  NEURO: CN II-XII intact       The surgical consent was then reviewed with the patient, who agreed with all the contents of the consent form and it was signed.     PHYSICAL THERAPY:  She was also instructed regarding physical therapy and will begin on POD#1-3. She is doing physical therapy at [facility].    POST OP CARE: Instructions were reviewed including care of the wound and dressing after surgery and when she can shower.     PAIN MANAGEMENT: Paul Fitzgerald was instructed regarding the Polar ice unit that will be in place after surgery and her postoperative pain medications.     MEDICATION:  Roxicodone 5 mg 1-2 q 4 hours PRN for pain  Zofran 4 mg q 8 hours PRN for nausea and vomiting.  Aspirin 81mg BID x 2 weeks for DVT prophylaxis starting on the evening after surgery.      Post op meds to be delivered bedside prior to discharge. Deliver to family if patient is in surgery at 5pm.     Patient was instructed to purchase and take Colace to counter possible GI side effects of taking opiates.     DVT prophylaxis was discussed with the patient today including risk factors for developing DVTs and history of DVTs. The patient was asked if any specific recommendations were given from the doctor/s that did pre-operative surgical clearance.      If the patient was previously taking 81mg baby aspirin, they were told to not take additional baby aspirin, using the above stated aspirin and to restart the 81mg aspirin daily after completion of the aspirin dose.      Patient was also told to buy over the counter Prilosec medication and take it once daily for GI protection as long as they are taking NSAIDs or Aspirin.     The patient was told that narcotic pain medications may make them drowsy and instructions  were given to not sign legal documents, drive or operate heavy machinery, cars, or equipment while under the influence of narcotic medications.     As there were no other questions to be asked, she was given my business card along with Dr. Noyola's business card if she has any questions or concerns prior to surgery or in the postop period.

## 2024-03-04 NOTE — H&P
Paul Fitzgerald  is here for a completion of her perioperative paperwork. she  Is scheduled to undergo right shoulder arthroscopic rotator cuff repair, biceps tenodesis, possible subacromial decompression  on 3/12/2024.  She is a healthy individual and does not need clearance for this procedure.     Risks, indications and benefits of the surgical procedure were discussed with the patient. All questions with regard to surgery, rehab, expected return to functional activities, activities of daily living and recreational endeavors were answered to her satisfaction.    Discussed COVID-19 with the patient, they are aware of our current policies and procedures, were given the option of delaying surgery, and they elect to proceed.    Patient was informed and understands the risks of surgery are greater for patients with a current condition or hx of heart disease, obesity, clotting disorders, recurrent infections, steroid use, current or past smoking, and factors such as sedentary lifestyle and noncompliance with medications, therapy or f/u. The degree of the increased risk is hard to estimate w/ any degree of precision.    Once no other questions were asked, a brief history and physical exam was then performed.    PAST MEDICAL HISTORY: History reviewed. No pertinent past medical history.  PAST SURGICAL HISTORY:   Past Surgical History:   Procedure Laterality Date    COLPOSCOPY  2019    TONGUE SURGERY      extra skin removed as a small child     FAMILY HISTORY:   Family History   Problem Relation Age of Onset    No Known Problems Father     Cancer Mother     Breast cancer Neg Hx     Colon cancer Neg Hx     Ovarian cancer Neg Hx      SOCIAL HISTORY:   Social History     Socioeconomic History    Marital status: Single   Tobacco Use    Smoking status: Every Day     Current packs/day: 1.00     Types: Cigarettes     Passive exposure: Current    Smokeless tobacco: Never   Substance and Sexual Activity    Alcohol use: Yes      Comment: soc    Drug use: Yes     Types: Marijuana    Sexual activity: Yes     Partners: Male     Birth control/protection: Condom, I.U.D.     Social Determinants of Health     Financial Resource Strain: Low Risk  (2/27/2024)    Overall Financial Resource Strain (CARDIA)     Difficulty of Paying Living Expenses: Not hard at all   Food Insecurity: No Food Insecurity (2/27/2024)    Hunger Vital Sign     Worried About Running Out of Food in the Last Year: Never true     Ran Out of Food in the Last Year: Never true   Transportation Needs: No Transportation Needs (2/27/2024)    PRAPARE - Transportation     Lack of Transportation (Medical): No     Lack of Transportation (Non-Medical): No   Physical Activity: Insufficiently Active (2/27/2024)    Exercise Vital Sign     Days of Exercise per Week: 4 days     Minutes of Exercise per Session: 20 min   Stress: Stress Concern Present (2/27/2024)    [employer] [employer] - Occupational Stress Questionnaire     Feeling of Stress : To some extent   Social Connections: Unknown (2/27/2024)    Social Connection and Isolation Panel [NHANES]     Frequency of Communication with Friends and Family: More than three times a week     Frequency of Social Gatherings with Friends and Family: Twice a week     Active Member of Clubs or Organizations: No     Attends Club or Organization Meetings: Never     Marital Status: Never    Housing Stability: Low Risk  (2/27/2024)    Housing Stability Vital Sign     Unable to Pay for Housing in the Last Year: No     Number of Places Lived in the Last Year: 1     Unstable Housing in the Last Year: No       MEDICATIONS:   Current Outpatient Medications:     benzonatate (TESSALON PERLES) 100 MG capsule, Take 1 capsule (100 mg total) by mouth every 6 (six) hours as needed for Cough., Disp: 30 capsule, Rfl: 1    copper intrauterine device (PARAGARD T 380A) 380 square mm IUD, 1 each by Intrauterine route., Disp: , Rfl:     desonide  (DESOWEN) 0.05 % cream, Apply topically 2 (two) times daily., Disp: , Rfl:     dextromethorphan-guaifenesin  mg (MUCINEX DM)  mg per 12 hr tablet, Take 1 tablet by mouth every 12 (twelve) hours., Disp: , Rfl:     fexofenadine (ALLEGRA) 60 MG tablet, Take 60 mg by mouth once daily., Disp: , Rfl:     fluticasone propionate (FLONASE) 50 mcg/actuation nasal spray, 1 spray (50 mcg total) by Each Nostril route once daily., Disp: 9.9 mL, Rfl: 0    meloxicam (MOBIC) 15 MG tablet, Take 1 tablet (15 mg total) by mouth once daily., Disp: 30 tablet, Rfl: 1    modafiniL (PROVIGIL) 100 MG Tab, Take 1 tablet (100 mg total) by mouth daily as needed., Disp: 30 tablet, Rfl: 5    nicotine (NICODERM CQ) 21 mg/24 hr, Place 1 patch onto the skin once daily., Disp: 28 patch, Rfl: 3    nicotine (NICODERM CQ) 21 mg/24 hr, Place 1 patch onto the skin once daily., Disp: 28 patch, Rfl: 0  ALLERGIES:   Review of patient's allergies indicates:   Allergen Reactions    Penicillins Hives       Review of Systems   Constitution: Negative. Negative for chills, fever and night sweats.   HENT: Negative for congestion and headaches.    Eyes: Negative for blurred vision, left vision loss and right vision loss.   Cardiovascular: Negative for chest pain and syncope.   Respiratory: Negative for cough and shortness of breath.    Endocrine: Negative for polydipsia, polyphagia and polyuria.   Hematologic/Lymphatic: Negative for bleeding problem. Does not bruise/bleed easily.   Skin: Negative for dry skin, itching and rash.   Musculoskeletal: Negative for falls and muscle weakness.   Gastrointestinal: Negative for abdominal pain and bowel incontinence.   Genitourinary: Negative for bladder incontinence and nocturia.   Neurological: Negative for disturbances in coordination, loss of balance and seizures.   Psychiatric/Behavioral: Negative for depression. The patient does not have insomnia.    Allergic/Immunologic: Negative for hives and persistent  infections.     PHYSICAL EXAM:  GEN: A&Ox3, WD WN NAD  HEENT: WNL  CHEST: CTAB, no W/R/R  HEART: RRR, no M/R/G   ABD: Soft, NT ND, BS x4 QUADS  MS: Refer to previous note for detailed MS exam  NEURO: CN II-XII intact       The surgical consent was then reviewed with the patient, who agreed with all the contents of the consent form and it was signed.     PHYSICAL THERAPY:  She was also instructed regarding physical therapy and will begin on POD#1-3. She is doing physical therapy at [facility].    POST OP CARE: Instructions were reviewed including care of the wound and dressing after surgery and when she can shower.     PAIN MANAGEMENT: Paul Fitzgerald was instructed regarding the Polar ice unit that will be in place after surgery and her postoperative pain medications.     MEDICATION:  Roxicodone 5 mg 1-2 q 4 hours PRN for pain  Zofran 4 mg q 8 hours PRN for nausea and vomiting.  Aspirin 81mg BID x 2 weeks for DVT prophylaxis starting on the evening after surgery.      Post op meds to be delivered bedside prior to discharge. Deliver to family if patient is in surgery at 5pm.     Patient was instructed to purchase and take Colace to counter possible GI side effects of taking opiates.     DVT prophylaxis was discussed with the patient today including risk factors for developing DVTs and history of DVTs. The patient was asked if any specific recommendations were given from the doctor/s that did pre-operative surgical clearance.      If the patient was previously taking 81mg baby aspirin, they were told to not take additional baby aspirin, using the above stated aspirin and to restart the 81mg aspirin daily after completion of the aspirin dose.      Patient was also told to buy over the counter Prilosec medication and take it once daily for GI protection as long as they are taking NSAIDs or Aspirin.     The patient was told that narcotic pain medications may make them drowsy and instructions  were given to not sign legal documents, drive or operate heavy machinery, cars, or equipment while under the influence of narcotic medications.     As there were no other questions to be asked, she was given my business card along with Dr. Noyola's business card if she has any questions or concerns prior to surgery or in the postop period.

## 2024-03-07 ENCOUNTER — PATIENT MESSAGE (OUTPATIENT)
Dept: PREADMISSION TESTING | Facility: HOSPITAL | Age: 39
End: 2024-03-07
Payer: COMMERCIAL

## 2024-03-07 ENCOUNTER — CLINICAL SUPPORT (OUTPATIENT)
Dept: SMOKING CESSATION | Facility: CLINIC | Age: 39
End: 2024-03-07

## 2024-03-07 DIAGNOSIS — F17.200 NICOTINE DEPENDENCE: Primary | ICD-10-CM

## 2024-03-07 PROCEDURE — 99999 PR PBB SHADOW E&M-EST. PATIENT-LVL I: CPT | Mod: PBBFAC,,,

## 2024-03-07 NOTE — ANESTHESIA PAT ROS NOTE
03/07/2024  Paul Fitzgerald is a 39 y.o., female.      Pre-op Assessment    I have reviewed the Patient Summary Reports.       I have reviewed the Medications.     Review of Systems  Social:  Smoker, Social Alcohol Use, Recreational Drugs Currently smokes 1 ppd X 20 years,  Personal history of nicotine dependence, H/O Marijuana usage      Hematology/Oncology:  Hematology Normal   Oncology Normal                               Oncology Comments: Family history of colon cancer in mother     EENT/Dental:  chronic allergic rhinitis Seasonal allergies, Tongue surgery- extra skin removed as a small child,  Parotiditis, Upper respiratory tract infection            Cardiovascular:  Cardiovascular Normal Exercise tolerance: good      Denies MI.  Denies CAD.       Denies Angina.       Denies HERNANDEZ.                            Pulmonary:     Denies Asthma.   Denies Shortness of breath.  Sleep Apnea Hypersomnia with sleep apnea,  GERONIMO (obstructive sleep apnea)                 Renal/:  Renal/ Normal  Denies Chronic Renal Disease.                Hepatic/GI:  Hepatic/GI Normal     Denies GERD. Denies Liver Disease.            Musculoskeletal:     Traumatic rotator cuff tear,   Biceps tendinitis of right upper extremity              OB/GYN/PEDS:  H/O abnormal Pap smear, S/P colposcopy,  History of ovarian cyst           Neurological:  Neurology Normal   Denies CVA.    Denies Headaches. Denies Seizures.                                Endocrine:  Denies Diabetes. Denies Hypothyroidism.        Obesity / BMI > 30  Psych:  Psychiatric Normal                   No past medical history on file.  Past Surgical History:   Procedure Laterality Date    COLPOSCOPY  2019    TONGUE SURGERY      extra skin removed as a small child       Anesthesia Assessment: Preoperative EQUATION    Planned Procedure: Procedure(s) (LRB):  REPAIR, ROTATOR  CUFF, ARTHROSCOPIC (Right)  FIXATION, TENDON, BICEPS TENODESIS (Right)  Requested Anesthesia Type:General  Surgeon: SILVIA Noyola MD  Service: Orthopedics  Known or anticipated Date of Surgery:3/12/2024    Surgeon notes: reviewed    Electronic QUestionnaire Assessment completed via nurse interview with patient.        Triage considerations:     The patient has no apparent active cardiac condition (No unstable coronary Syndrome such as severe unstable angina or recent [<1 month] myocardial infarction, decompensated CHF, severe valvular   disease or significant arrhythmia)    Previous anesthesia records:No problems and Not available    Last PCP note: 3-6 months ago , within [facility]   Subspecialty notes: n/a    Other important co-morbidities: Obesity, GERONIMO, and Smoker       EKG 5/24/2018:  Vent. Rate : 086 BPM     Atrial Rate : 086 BPM      P-R Int : 140 ms          QRS Dur : 074 ms       QT Int : 388 ms       P-R-T Axes : 030 024 035 degrees      QTc Int : 464 ms   Normal sinus rhythm   Low voltage QRS   Borderline Abnormal ECG   No previous ECGs available   Confirmed by MD AMI, PANKAJ ([id number]) on 5/26/2018 3:41:27 PM        Tests already available:  Results have been reviewed.             Instructions given. (See in Nurse's note)      Optimization:  Anesthesia Preop Clinic Assessment Not Indicated    Medical Opinion Indicated: No       Sub-specialist consult indicated: N/A      Plan:    Consultation: Medical clearance is not requested.      Navigation: No tests Scheduled.              Consults scheduled: N/A             No tests, anesthesia preop clinic visit, or consult required.                        Patient is OK to proceed with surgery at [facility].       Ht: 4'10  Wt: 167 lb  BMI: 35.04  Vaccinated

## 2024-03-08 ENCOUNTER — TELEPHONE (OUTPATIENT)
Dept: SPORTS MEDICINE | Facility: CLINIC | Age: 39
End: 2024-03-08
Payer: COMMERCIAL

## 2024-03-08 NOTE — TELEPHONE ENCOUNTER
MRN: [id number] - Paul Fitzgerald  Received: Today  Dale Salcido W Stephen, MD; CHASITY Gonzalez Staff  Good Morning, patient Paul Fitzgerald is scheduled 3/12/24 for an Outpatient Surgery. The patient authorization with her insurance [employer] has been Partial Approval. Cpt Code 15591 has been Approved but Cpt Code 02514 was Denied (Per [employer] -Procedure Not Covered).      A Peer to Peer/Appeal is an option by contacting the patient plan authorization dept (Steve) at [phone].  Please reference Auth # [id number] and/or Case # [id number].    I did speak with the patient to advise of the partial approval. Patient asked if Dr can request a Peer to Peer/Appeal. I stated to patient that I would send Dr a message advising him of the decision and present the option for the Peer to Peer/Appeal.      Thank you.  Dale GONZALES  Surgery PreService

## 2024-03-08 NOTE — TELEPHONE ENCOUNTER
Tre Wilson, [phone]. Case # [id number].    They stated the question regarding superior capsule reconstruction was answered as unknown which triggered denial of [id number]. I was then transferred to RN reviewer. Advised that superior capsule reconstruction is not part of pt's surgical plan. Changes made & [id number] was approved.     Auth # [id number]

## 2024-03-11 ENCOUNTER — ANESTHESIA EVENT (OUTPATIENT)
Dept: SURGERY | Facility: HOSPITAL | Age: 39
End: 2024-03-11
Payer: COMMERCIAL

## 2024-03-12 ENCOUNTER — ANESTHESIA (OUTPATIENT)
Dept: SURGERY | Facility: HOSPITAL | Age: 39
End: 2024-03-12
Payer: COMMERCIAL

## 2024-03-12 ENCOUNTER — HOSPITAL ENCOUNTER (OUTPATIENT)
Facility: HOSPITAL | Age: 39
Discharge: HOME | End: 2024-03-12
Attending: ORTHOPAEDIC SURGERY | Admitting: ORTHOPAEDIC SURGERY
Payer: COMMERCIAL

## 2024-03-12 VITALS
WEIGHT: 167 LBS | SYSTOLIC BLOOD PRESSURE: 104 MMHG | HEART RATE: 64 BPM | TEMPERATURE: 98 F | DIASTOLIC BLOOD PRESSURE: 58 MMHG | HEIGHT: 58 IN | OXYGEN SATURATION: 96 % | RESPIRATION RATE: 19 BRPM | BODY MASS INDEX: 35.05 KG/M2

## 2024-03-12 DIAGNOSIS — M67.921 BICEPS TENDINOPATHY OF RIGHT UPPER EXTREMITY: Primary | ICD-10-CM

## 2024-03-12 DIAGNOSIS — S46.011A TRAUMATIC COMPLETE TEAR OF RIGHT ROTATOR CUFF, INITIAL ENCOUNTER: ICD-10-CM

## 2024-03-12 DIAGNOSIS — S46.011D TRAUMATIC ROTATOR CUFF TEAR, RIGHT, SUBSEQUENT ENCOUNTER: ICD-10-CM

## 2024-03-12 LAB
B-HCG UR QL: NEGATIVE
CTP QC/QA: YES

## 2024-03-12 PROCEDURE — 29823 SHO ARTHRS SRG XTNSV DBRDMT: CPT | Mod: 51,RT,, | Performed by: ORTHOPAEDIC SURGERY

## 2024-03-12 PROCEDURE — 64415 NJX AA&/STRD BRCH PLXS IMG: CPT | Performed by: STUDENT IN AN ORGANIZED HEALTH CARE EDUCATION/TRAINING PROGRAM

## 2024-03-12 PROCEDURE — 63600175 PHARM REV CODE 636 W HCPCS: Performed by: ORTHOPAEDIC SURGERY

## 2024-03-12 PROCEDURE — 99900035 HC TECH TIME PER 15 MIN (STAT)

## 2024-03-12 PROCEDURE — 27201423 OPTIME MED/SURG SUP & DEVICES STERILE SUPPLY: Performed by: ORTHOPAEDIC SURGERY

## 2024-03-12 PROCEDURE — D9220A PRA ANESTHESIA: Mod: ANES,,, | Performed by: ANESTHESIOLOGY

## 2024-03-12 PROCEDURE — 63600175 PHARM REV CODE 636 W HCPCS: Performed by: NURSE ANESTHETIST, CERTIFIED REGISTERED

## 2024-03-12 PROCEDURE — 23430 REPAIR BICEPS TENDON: CPT | Mod: RT,,, | Performed by: ORTHOPAEDIC SURGERY

## 2024-03-12 PROCEDURE — 37000009 HC ANESTHESIA EA ADD 15 MINS: Performed by: ORTHOPAEDIC SURGERY

## 2024-03-12 PROCEDURE — 25000003 PHARM REV CODE 250: Performed by: NURSE ANESTHETIST, CERTIFIED REGISTERED

## 2024-03-12 PROCEDURE — 81025 URINE PREGNANCY TEST: CPT | Mod: ,,, | Performed by: PHYSICIAN ASSISTANT

## 2024-03-12 PROCEDURE — 64415 NJX AA&/STRD BRCH PLXS IMG: CPT | Mod: 59,RT,, | Performed by: ANESTHESIOLOGY

## 2024-03-12 PROCEDURE — 71000015 HC POSTOP RECOV 1ST HR: Performed by: ORTHOPAEDIC SURGERY

## 2024-03-12 PROCEDURE — 25000003 PHARM REV CODE 250: Performed by: PHYSICIAN ASSISTANT

## 2024-03-12 PROCEDURE — 25000003 PHARM REV CODE 250: Performed by: ANESTHESIOLOGY

## 2024-03-12 PROCEDURE — 94761 N-INVAS EAR/PLS OXIMETRY MLT: CPT

## 2024-03-12 PROCEDURE — 63600175 PHARM REV CODE 636 W HCPCS: Performed by: PHYSICIAN ASSISTANT

## 2024-03-12 PROCEDURE — 81025 URINE PREGNANCY TEST: CPT | Performed by: PHYSICIAN ASSISTANT

## 2024-03-12 PROCEDURE — 71000033 HC RECOVERY, INTIAL HOUR: Performed by: ORTHOPAEDIC SURGERY

## 2024-03-12 PROCEDURE — 36000710: Performed by: ORTHOPAEDIC SURGERY

## 2024-03-12 PROCEDURE — 36000711: Performed by: ORTHOPAEDIC SURGERY

## 2024-03-12 PROCEDURE — 37000008 HC ANESTHESIA 1ST 15 MINUTES: Performed by: ORTHOPAEDIC SURGERY

## 2024-03-12 PROCEDURE — D9220A PRA ANESTHESIA: Mod: CRNA,,, | Performed by: NURSE ANESTHETIST, CERTIFIED REGISTERED

## 2024-03-12 DEVICE — KNOTLESS TENSIONTIGHT BUTTON IMPLANT SYS
Type: IMPLANTABLE DEVICE | Site: SHOULDER | Status: FUNCTIONAL
Brand: ARTHREX®

## 2024-03-12 RX ORDER — MIDAZOLAM HYDROCHLORIDE 1 MG/ML
INJECTION, SOLUTION INTRAMUSCULAR; INTRAVENOUS
Status: DISCONTINUED | OUTPATIENT
Start: 2024-03-12 | End: 2024-03-12

## 2024-03-12 RX ORDER — DEXAMETHASONE SODIUM PHOSPHATE 4 MG/ML
INJECTION, SOLUTION INTRA-ARTICULAR; INTRALESIONAL; INTRAMUSCULAR; INTRAVENOUS; SOFT TISSUE
Status: DISCONTINUED | OUTPATIENT
Start: 2024-03-12 | End: 2024-03-12

## 2024-03-12 RX ORDER — OXYCODONE HYDROCHLORIDE 5 MG/1
5 TABLET ORAL
Status: DISCONTINUED | OUTPATIENT
Start: 2024-03-12 | End: 2024-03-12 | Stop reason: HOSPADM

## 2024-03-12 RX ORDER — BUPIVACAINE HYDROCHLORIDE 5 MG/ML
INJECTION, SOLUTION EPIDURAL; INTRACAUDAL
Status: DISCONTINUED | OUTPATIENT
Start: 2024-03-12 | End: 2024-03-12 | Stop reason: HOSPADM

## 2024-03-12 RX ORDER — EPINEPHRINE 1 MG/ML
INJECTION, SOLUTION, CONCENTRATE INTRAVENOUS
Status: DISCONTINUED | OUTPATIENT
Start: 2024-03-12 | End: 2024-03-12 | Stop reason: HOSPADM

## 2024-03-12 RX ORDER — FENTANYL CITRATE 50 UG/ML
25 INJECTION, SOLUTION INTRAMUSCULAR; INTRAVENOUS EVERY 5 MIN PRN
Status: DISCONTINUED | OUTPATIENT
Start: 2024-03-12 | End: 2024-03-12 | Stop reason: HOSPADM

## 2024-03-12 RX ORDER — FAMOTIDINE 10 MG/ML
INJECTION INTRAVENOUS
Status: DISCONTINUED | OUTPATIENT
Start: 2024-03-12 | End: 2024-03-12

## 2024-03-12 RX ORDER — KETAMINE HCL IN 0.9 % NACL 50 MG/5 ML
SYRINGE (ML) INTRAVENOUS
Status: DISCONTINUED | OUTPATIENT
Start: 2024-03-12 | End: 2024-03-12

## 2024-03-12 RX ORDER — CARBOXYMETHYLCELLULOSE SODIUM 10 MG/ML
GEL OPHTHALMIC
Status: DISCONTINUED | OUTPATIENT
Start: 2024-03-12 | End: 2024-03-12

## 2024-03-12 RX ORDER — BUPIVACAINE HYDROCHLORIDE AND EPINEPHRINE 2.5; 5 MG/ML; UG/ML
INJECTION, SOLUTION EPIDURAL; INFILTRATION; INTRACAUDAL; PERINEURAL
Status: COMPLETED | OUTPATIENT
Start: 2024-03-12 | End: 2024-03-12

## 2024-03-12 RX ORDER — FENTANYL CITRATE 50 UG/ML
100 INJECTION, SOLUTION INTRAMUSCULAR; INTRAVENOUS
Status: DISCONTINUED | OUTPATIENT
Start: 2024-03-12 | End: 2024-03-12 | Stop reason: HOSPADM

## 2024-03-12 RX ORDER — SODIUM CHLORIDE 9 MG/ML
INJECTION, SOLUTION INTRAVENOUS CONTINUOUS
Status: DISCONTINUED | OUTPATIENT
Start: 2024-03-12 | End: 2024-03-12 | Stop reason: HOSPADM

## 2024-03-12 RX ORDER — ROCURONIUM BROMIDE 10 MG/ML
INJECTION, SOLUTION INTRAVENOUS
Status: DISCONTINUED | OUTPATIENT
Start: 2024-03-12 | End: 2024-03-12

## 2024-03-12 RX ORDER — PROPOFOL 10 MG/ML
VIAL (ML) INTRAVENOUS
Status: DISCONTINUED | OUTPATIENT
Start: 2024-03-12 | End: 2024-03-12

## 2024-03-12 RX ORDER — ACETAMINOPHEN 500 MG
1000 TABLET ORAL
Status: COMPLETED | OUTPATIENT
Start: 2024-03-12 | End: 2024-03-12

## 2024-03-12 RX ORDER — BUPIVACAINE HYDROCHLORIDE 2.5 MG/ML
INJECTION, SOLUTION EPIDURAL; INFILTRATION; INTRACAUDAL
Status: DISCONTINUED
Start: 2024-03-12 | End: 2024-03-12 | Stop reason: HOSPADM

## 2024-03-12 RX ORDER — LIDOCAINE HYDROCHLORIDE 20 MG/ML
INJECTION INTRAVENOUS
Status: DISCONTINUED | OUTPATIENT
Start: 2024-03-12 | End: 2024-03-12

## 2024-03-12 RX ORDER — FENTANYL CITRATE 50 UG/ML
INJECTION, SOLUTION INTRAMUSCULAR; INTRAVENOUS
Status: DISCONTINUED | OUTPATIENT
Start: 2024-03-12 | End: 2024-03-12

## 2024-03-12 RX ORDER — ONDANSETRON HYDROCHLORIDE 2 MG/ML
INJECTION, SOLUTION INTRAVENOUS
Status: DISCONTINUED | OUTPATIENT
Start: 2024-03-12 | End: 2024-03-12

## 2024-03-12 RX ORDER — CELECOXIB 200 MG/1
400 CAPSULE ORAL
Status: COMPLETED | OUTPATIENT
Start: 2024-03-12 | End: 2024-03-12

## 2024-03-12 RX ORDER — MIDAZOLAM HYDROCHLORIDE 1 MG/ML
1 INJECTION INTRAMUSCULAR; INTRAVENOUS
Status: DISCONTINUED | OUTPATIENT
Start: 2024-03-12 | End: 2024-03-12 | Stop reason: HOSPADM

## 2024-03-12 RX ADMIN — CELECOXIB 400 MG: 200 CAPSULE ORAL at 06:03

## 2024-03-12 RX ADMIN — MIDAZOLAM HYDROCHLORIDE 2 MG: 1 INJECTION, SOLUTION INTRAMUSCULAR; INTRAVENOUS at 06:03

## 2024-03-12 RX ADMIN — MIDAZOLAM 4 MG: 1 INJECTION INTRAMUSCULAR; INTRAVENOUS at 06:03

## 2024-03-12 RX ADMIN — DEXAMETHASONE SODIUM PHOSPHATE 12 MG: 4 INJECTION, SOLUTION INTRAMUSCULAR; INTRAVENOUS at 07:03

## 2024-03-12 RX ADMIN — PROPOFOL 200 MG: 10 INJECTION, EMULSION INTRAVENOUS at 07:03

## 2024-03-12 RX ADMIN — Medication 10 MG: at 08:03

## 2024-03-12 RX ADMIN — ROCURONIUM BROMIDE 20 MG: 10 INJECTION INTRAVENOUS at 08:03

## 2024-03-12 RX ADMIN — ACETAMINOPHEN 1000 MG: 500 TABLET ORAL at 06:03

## 2024-03-12 RX ADMIN — Medication 30 MG: at 07:03

## 2024-03-12 RX ADMIN — CEFAZOLIN 2 G: 2 INJECTION, POWDER, FOR SOLUTION INTRAMUSCULAR; INTRAVENOUS at 07:03

## 2024-03-12 RX ADMIN — OXYCODONE 5 MG: 5 TABLET ORAL at 09:03

## 2024-03-12 RX ADMIN — SODIUM CHLORIDE: 0.9 INJECTION, SOLUTION INTRAVENOUS at 06:03

## 2024-03-12 RX ADMIN — LIDOCAINE HYDROCHLORIDE 100 MG: 20 INJECTION INTRAVENOUS at 07:03

## 2024-03-12 RX ADMIN — SUGAMMADEX 300 MG: 100 INJECTION, SOLUTION INTRAVENOUS at 08:03

## 2024-03-12 RX ADMIN — BUPIVACAINE HYDROCHLORIDE AND EPINEPHRINE BITARTRATE 20 ML: 2.5; .0091 INJECTION, SOLUTION EPIDURAL; INFILTRATION; INTRACAUDAL; PERINEURAL at 06:03

## 2024-03-12 RX ADMIN — FAMOTIDINE 20 MG: 10 INJECTION, SOLUTION INTRAVENOUS at 07:03

## 2024-03-12 RX ADMIN — SODIUM CHLORIDE, SODIUM GLUCONATE, SODIUM ACETATE, POTASSIUM CHLORIDE, MAGNESIUM CHLORIDE, SODIUM PHOSPHATE, DIBASIC, AND POTASSIUM PHOSPHATE: .53; .5; .37; .037; .03; .012; .00082 INJECTION, SOLUTION INTRAVENOUS at 07:03

## 2024-03-12 RX ADMIN — ROCURONIUM BROMIDE 50 MG: 10 INJECTION INTRAVENOUS at 07:03

## 2024-03-12 RX ADMIN — ONDANSETRON 4 MG: 2 INJECTION INTRAMUSCULAR; INTRAVENOUS at 08:03

## 2024-03-12 RX ADMIN — CARBOXYMETHYLCELLULOSE SODIUM 4 DROP: 10 GEL OPHTHALMIC at 07:03

## 2024-03-12 RX ADMIN — FENTANYL CITRATE 100 MCG: 50 INJECTION, SOLUTION INTRAMUSCULAR; INTRAVENOUS at 07:03

## 2024-03-12 NOTE — ANESTHESIA PROCEDURE NOTES
Intubation    Date/Time: 3/12/2024 7:12 AM    Performed by: Dae Eubanks CRNA  Authorized by: Kajal Cruz MD    Intubation:     Induction:  Intravenous    Intubated:  Postinduction    Mask Ventilation:  Easy mask    Attempts:  1    Attempted By:  CRNA    Method of Intubation:  Video laryngoscopy    Blade:  Rueda 3    Laryngeal View Grade: Grade I - full view of cords      Difficult Airway Encountered?: No      Complications:  None    Airway Device:  Oral endotracheal tube    Airway Device Size:  7.5    Style/Cuff Inflation:  Cuffed    Inflation Amount (mL):  6    Tube secured:  21    Secured at:  The lips    Placement Verified By:  Capnometry    Complicating Factors:  None    Findings Post-Intubation:  BS equal bilateral and atraumatic/condition of teeth unchanged

## 2024-03-12 NOTE — BRIEF OP NOTE
[place] - Surgery (Hospital)  Brief Operative Note    Surgery Date: 3/12/2024     Surgeon(s) and Role:     * SILVIA Noyola MD - Primary    Assisting Surgeon: None    Pre-op Diagnosis:  Traumatic rotator cuff tear, initial encounter [S46.019A]  Biceps tendinitis of right upper extremity [M75.21]    Post-op Diagnosis:  Post-Op Diagnosis Codes:     * Traumatic rotator cuff tear, initial encounter [S46.019A]     * Biceps tendinitis of right upper extremity [M75.21]    Procedure(s) (LRB):  FIXATION, TENDON, BICEPS TENODESIS (Right)  ARTHROSCOPY, SHOULDER, WITH SUBACROMIAL SPACE DECOMPRESSION  DEBRIDEMENT, SHOULDER, ARTHROSCOPIC (Right)    Anesthesia: General    Operative Findings: Biceps Tendonitis    Estimated Blood Loss: <50ccs         Specimens:   Specimen (24h ago, onward)      None              Discharge Note    OUTCOME: Patient tolerated treatment/procedure well without complication and is now ready for discharge.    DISPOSITION: Home or Self Care    FINAL DIAGNOSIS:  <principal problem not specified>    FOLLOWUP: In clinic    DISCHARGE INSTRUCTIONS:  No discharge procedures on file.

## 2024-03-12 NOTE — ANESTHESIA POSTPROCEDURE EVALUATION
Anesthesia Post Evaluation    Patient: Paul Fitzgerald    Procedure(s) Performed: Procedure(s) (LRB):  FIXATION, TENDON, BICEPS TENODESIS (Right)  ARTHROSCOPY, SHOULDER, WITH SUBACROMIAL SPACE DECOMPRESSION  DEBRIDEMENT, SHOULDER, ARTHROSCOPIC (Right)    Final Anesthesia Type: general      Patient location during evaluation: PACU  Patient participation: Yes- Able to Participate  Level of consciousness: awake and alert  Post-procedure vital signs: reviewed and stable  Pain management: adequate  Airway patency: patent    PONV status at discharge: No PONV  Anesthetic complications: no      Cardiovascular status: blood pressure returned to baseline  Respiratory status: unassisted  Hydration status: euvolemic  Follow-up not needed.              Vitals Value Taken Time   /58 03/12/24 0947   Temp 36.5 °C (97.7 °F) 03/12/24 0849   Pulse 66 03/12/24 0959   Resp 21 03/12/24 0958   SpO2 97 % 03/12/24 0959   Vitals shown include unvalidated device data.      No case tracking events are documented in the log.      Pain/Una Score: Pain Rating Prior to Med Admin: 5 (3/12/2024  9:50 AM)  Pain Rating Post Med Admin: 4 (3/12/2024  9:50 AM)  Una Score: 10 (3/12/2024  9:18 AM)

## 2024-03-12 NOTE — PLAN OF CARE
"Patient is AAO and VSS.  Tolerating PO and states pain is tolerable.  Dressing CDI.  Patient states they are ready for d/c.  IV removed.  Catheter tip intact.  Caregiver at bedside.  Discharge instructions reviewed and copy given to the patient and caregiver.  Questions answered.  Both verbalized understanding.  Medication delivered to bedside and reviewed. Pt asked if OK with family member bringing belongings to car. Pt stated "I just need my phone".  Patient wheeled to car by RN with NAD noted.     "

## 2024-03-12 NOTE — PLAN OF CARE
Patient prepped for procedure.  POC reviewed with pt and her mother, who verbalized understanding.    Outpatient pharmacy confirmed.  Dayan agustin refused.      All belongings to remain in locker during procedure.    Side rails up x2, call light in reach.     Patient has permanent jewelry on left ankle.  Removed and placed in pt purse.  Patient drank 500 cc h2o this morning. Dr. Cruz aware, stomach scanned, ok to proceed.

## 2024-03-12 NOTE — ANESTHESIA PROCEDURE NOTES
R interscalene single shot    Patient location during procedure: pre-op   Block not for primary anesthetic.  Reason for block: at surgeon's request and post-op pain management   Post-op Pain Location: right shoulder pain   Start time: 3/12/2024 6:40 AM  Timeout: 3/12/2024 6:40 AM   End time: 3/12/2024 6:45 AM    Staffing  Authorizing Provider: Kajal Cruz MD  Performing Provider: Pat Gonzalez MD    Staffing  Performed by: Pat Gonzalez MD  Authorized by: Kajal Cruz MD    Preanesthetic Checklist  Completed: patient identified, IV checked, site marked, risks and benefits discussed, surgical consent, monitors and equipment checked, pre-op evaluation and timeout performed  Peripheral Block  Patient position: sitting  Prep: ChloraPrep  Patient monitoring: heart rate, cardiac monitor, continuous pulse ox, continuous capnometry and frequent blood pressure checks  Block type: interscalene  Laterality: right  Injection technique: single shot  Needle  Needle type: Stimuplex   Needle gauge: 22 G  Needle length: 2 in  Needle localization: anatomical landmarks and ultrasound guidance   -ultrasound image captured on disc.  Assessment  Injection assessment: negative aspiration, negative parasthesia and local visualized surrounding nerve  Paresthesia pain: none  Heart rate change: no  Slow fractionated injection: yes  Pain Tolerance: comfortable throughout block and no complaints  Medications:    Medications: bupivacaine 0.25%-EPINEPHrine (PF) 1:200,000 injection - Intrathecal   20 mL - 3/12/2024 6:40:00 AM    Additional Notes  VSS.  [facility] RN monitoring vitals throughout procedure.  Patient tolerated procedure well.

## 2024-03-12 NOTE — ANESTHESIA PREPROCEDURE EVALUATION
03/12/2024  Paul Fitzgerald is a 39 y.o., female.    Pre-operative evaluation for Procedure(s) (LRB):  REPAIR, ROTATOR CUFF, ARTHROSCOPIC (Right)  FIXATION, TENDON, BICEPS TENODESIS (Right)    Paul Fitzgerald is a 39 y.o. female     Patient Active Problem List   Diagnosis    Parotiditis    Upper respiratory tract infection    Family history of colon cancer in mother    Personal history of nicotine dependence       Review of patient's allergies indicates:   Allergen Reactions    Penicillins Hives       No current facility-administered medications on file prior to encounter.     Current Outpatient Medications on File Prior to Encounter   Medication Sig Dispense Refill    fluticasone propionate (FLONASE) 50 mcg/actuation nasal spray 1 spray (50 mcg total) by Each Nostril route once daily. 9.9 mL 0    meloxicam (MOBIC) 15 MG tablet Take 1 tablet (15 mg total) by mouth once daily. 30 tablet 1    aspirin (ECOTRIN) 81 MG EC tablet Take 1 tablet (81 mg total) by mouth 2 (two) times a day. for 14 days 28 tablet 0    benzonatate (TESSALON PERLES) 100 MG capsule Take 1 capsule (100 mg total) by mouth every 6 (six) hours as needed for Cough. 30 capsule 1    copper intrauterine device (PARAGARD T 380A) 380 square mm IUD 1 each by Intrauterine route.      desonide (DESOWEN) 0.05 % cream Apply topically 2 (two) times daily.      dextromethorphan-guaifenesin  mg (MUCINEX DM)  mg per 12 hr tablet Take 1 tablet by mouth every 12 (twelve) hours.      fexofenadine (ALLEGRA) 60 MG tablet Take 60 mg by mouth once daily.      modafiniL (PROVIGIL) 100 MG Tab Take 1 tablet (100 mg total) by mouth daily as needed. 30 tablet 5    nicotine (NICODERM CQ) 21 mg/24 hr Place 1 patch onto the skin once daily. 28 patch 3    nicotine (NICODERM CQ) 21 mg/24 hr Place 1 patch onto the skin once daily. 28 patch 0    ondansetron  "(ZOFRAN-ODT) 4 MG TbDL Dissolve 1 tablet (4 mg total) by mouth every 8 (eight) hours as needed (nausea). 30 tablet 0    oxyCODONE (ROXICODONE) 5 MG immediate release tablet Take 1-2 tablets (5-10 mg total) by mouth every 4 to 6 hours as needed for Pain. 28 tablet 0       Past Surgical History:   Procedure Laterality Date    COLPOSCOPY  2019    TONGUE SURGERY      extra skin removed as a small child         CBC:  Lab Results   Component Value Date    WBC 7.95 2023    RBC 4.36 2023    HGB 13.2 2023    HCT 39.4 2023     2023    MCV 90 2023    MCH 30.3 2023    MCHC 33.5 2023       CMP:   Lab Results   Component Value Date     2023    K 4.1 2023     2023    CO2 27 2023    BUN 9 2023    CREATININE 0.7 2023     (H) 2023    CALCIUM 10.0 2023    ALBUMIN 4.0 2023    PROT 7.5 2023    ALKPHOS 50 (L) 2023    ALT 20 2023    AST 17 2023    BILITOT 0.3 2023       INR:  No results found for: "PT", "INR", "PROTIME", "APTT"      Diagnostic Studies:      EKD Echo:  No results found for this or any previous visit.    Stress Test:   No results found for this or any previous visit.      Pre-op Vitals [24 0600]   BP Pulse Resp Temp SpO2   (!) 108/59 75 16 36.5 °C (97.7 °F) 100 %      Height Weight BMI (Calculated)     4' 10" 167 lb 34.9         Pre-op Vitals [24 0600]   BP Pulse Resp Temp SpO2   (!) 108/59 75 16 36.5 °C (97.7 °F) 100 %      Height Weight BMI (Calculated)     4' 10" 167 lb 34.9         Pre-op Assessment          Review of Systems      Physical Exam  General: Well nourished    Airway:  Mallampati: I / I  Mouth Opening: Normal  TM Distance: Normal  Tongue: Normal  Neck ROM: Normal ROM    Dental:  Intact    Chest/Lungs:  Clear to auscultation    Heart:  Rate: Normal  Rhythm: Regular Rhythm  Sounds: Normal        Anesthesia Plan  Type of Anesthesia, " risks & benefits discussed:    Anesthesia Type: MAC, Gen ETT, Gen Supraglottic Airway, Gen Natural Airway  Intra-op Monitoring Plan: Standard ASA Monitors  Post Op Pain Control Plan: multimodal analgesia and peripheral nerve block  Induction:  IV  Airway Plan: Direct  Informed Consent: Informed consent signed with the Patient and all parties understand the risks and agree with anesthesia plan.  All questions answered.   ASA Score: 2  Day of Surgery Review of History & Physical: H&P Update referred to the surgeon/provider.    Ready For Surgery From Anesthesia Perspective.     .

## 2024-03-13 ENCOUNTER — TELEPHONE (OUTPATIENT)
Dept: SPORTS MEDICINE | Facility: CLINIC | Age: 39
End: 2024-03-13
Payer: COMMERCIAL

## 2024-03-13 NOTE — TELEPHONE ENCOUNTER
----- Message from Marina Jiang MA sent at 3/13/2024  2:05 PM CDT -----  Regarding: FW: Pt Advice  Contact: pt [phone]  Any idea from who?  ----- Message -----  From: Krystle Coronado  Sent: 3/13/2024   1:56 PM CDT  To: [facility] Staff  Subject: Pt Advice                                        Missed Callback    Pt returning callback from missed call. Requesting to speak with somebody in Dr. Noyola's office. Please call pt @[phone]

## 2024-03-13 NOTE — TELEPHONE ENCOUNTER
Spoke c pt. Advised that no one from our office called her today. Answered PO questions concerning sling. Confirmed that abduction pillow is not required as no RCR was performed. Discussed biceps tenodesis. Pt will call c additional questions/concerns in interim. Pt expressed understanding & was thankful.

## 2024-03-14 ENCOUNTER — CLINICAL SUPPORT (OUTPATIENT)
Dept: REHABILITATION | Facility: HOSPITAL | Age: 39
End: 2024-03-14
Payer: COMMERCIAL

## 2024-03-14 DIAGNOSIS — M25.511 CHRONIC RIGHT SHOULDER PAIN: ICD-10-CM

## 2024-03-14 DIAGNOSIS — M67.921 BICEPS TENDINOPATHY OF RIGHT UPPER EXTREMITY: ICD-10-CM

## 2024-03-14 DIAGNOSIS — S46.011A TRAUMATIC COMPLETE TEAR OF RIGHT ROTATOR CUFF, INITIAL ENCOUNTER: ICD-10-CM

## 2024-03-14 DIAGNOSIS — G89.29 CHRONIC RIGHT SHOULDER PAIN: ICD-10-CM

## 2024-03-14 DIAGNOSIS — M25.611 DECREASED ROM OF RIGHT SHOULDER: Primary | ICD-10-CM

## 2024-03-14 PROCEDURE — 97110 THERAPEUTIC EXERCISES: CPT

## 2024-03-14 PROCEDURE — 97161 PT EVAL LOW COMPLEX 20 MIN: CPT

## 2024-03-14 NOTE — PLAN OF CARE
[facility]   Physical Therapy Initial Evaluation      Name: Paul Fitzgerald  Clinic Number: [id number]    Therapy Diagnosis: No diagnosis found.     Physician: Angie Parkinson*    Physician Orders: PT Eval and Treat   Medical Diagnosis from Referral: Traumatic complete tear of right rotator cuff, initial encounter    Biceps tendinopathy of right upper extremity  Evaluation Date: 3/14/2024  Authorization Period Expiration: 03/05/202412/31/2024  Plan of Care Expiration: 8/14/24  Progress Note Due: 4/14/24  Visit # / Visits authorized: 1 / 1    FOTO: 6%  FOTO 2:   FOTO 3:  DC FOTO @: 61%      Precautions: Standard     Time In: 100 pm   Time Out: 200 pm   Total Appointment Time (timed & untimed codes): 60 minutes    DATE OF PROCEDURE: 3/12/2024   Right  1. Shoulder open subpectoral biceps tenodesis   2. Shoulder arthroscopic extensive debridement   3. Shoulder arthroscopic subacromial decompression    Physical Therapy: Follow the biceps tenodesis repair rehab protocol. PROM may start now.  Active range of motion of the elbow and shoulder may start in 1 week. No biceps resistive activity x 8 weeks. Sling immobilization for 3-4 weeks.    Subjective     Date of onset: 6 WEEKS AGO     History of current condition - Paul reports: fell down some stairs and tore her bicep tendon. Surgery was yesterday. Sleep through the night last night which is better than the first day.     Falls: yes    Imaging: see chart    Prior Therapy: no  Social History: rowing, peloton; has 7 year old nephew who lives with her; mother staying with her for now to assist  Occupation:  - desk job [employer]  Prior Level of Function:  no prior injuries, no prior shoulder pain   Current Level of Function: as expected postop day 2    Pain:  Current 7/10, worst 9/10, best 0/10   Location: R SHOULDER  Description: 2 DAYS POSTOP  Aggravating Factors: waking up  Easing Factors: pain medication     Patients goals: full  PROM, PLOF     Medical History:   No past medical history on file.    Surgical History:   Paul Fitzgerald  has a past surgical history that includes Colposcopy (2019); Tongue surgery; Fixation of tendon (Right, 3/12/2024); Arthroscopy of shoulder with decompression of subacromial space (3/12/2024); and Arthroscopic debridement of shoulder (Right, 3/12/2024).    Medications:   Paul has a current medication list which includes the following prescription(s): aspirin, benzonatate, paragard t 380a, desonide, dextromethorphan-guaifenesin  mg, fexofenadine, fluticasone propionate, meloxicam, modafinil, nicotine, nicotine, ondansetron, and oxycodone.    Allergies:   Review of patient's allergies indicates:   Allergen Reactions    Penicillins Hives        Objective      Observation: presents with bilateral knee high day hose and in shoulder sling /swathe    Passive Range of Motion:   Shoulder R   Flexion 60   Abduction 45   ER at 20 20   IR To stomach      Elbow ROM:  Elbow Right Left   Flexion 145/150 150/150   Extension 4 from neutral  0/0       Upper Extremity Strength:  Formal MMT not performed 2/2 POD#2 and increased pain.      Joint Mobility: not tested POD2    Palpation:  not much palpated, pulse in tact    Sensation: Intact but diminished 2/2 residual nerve block.       Limitation/Restriction for FOTO SHOULDER Survey    Therapist reviewed FOTO scores for Paul Fitzgerald on 3/14/2024.   FOTO documents entered into [employer] - see Media section.    Limitation Score: 6%         Treatment     Total Treatment time (time-based codes) separate from Evaluation: 30 minutes     Paul received the treatments listed below:      Paul received the following manual therapy techniques: Joint mobilizations, Manual traction, Myofacial release, Soft tissue Mobilization, Friction Massage and Functional Dry Needling were applied for 10 minutes, including:  PROM R elbow, R shoulder in pain-free range    Paul received therapeutic  exercises to develop strength, endurance, ROM, flexibility, posture and core stabilization for 20 minutes including:   Patient Education as listed below  Shrugs 10x  Scap squeezes 10x  Wrist AROM (all directions) 10x each  Pendulum hangs 10x  Elbow AAROM 10x    Patient Education and Home Exercises     Education provided:   - HEP  - post op precautions  - sling wear  - infection prevention  - NWB RUE  -     Written Home Exercises Provided: yes. Exercises were reviewed and Paul was able to demonstrate them prior to the end of the session.  Paul demonstrated good  understanding of the education provided. See EMR under Patient Instructions for exercises provided during therapy sessions.    Assessment     Paul is a 39 y.o. female referred to outpatient Physical Therapy with a medical diagnosis of Traumatic complete tear of right rotator cuff, initial encounter / Biceps tendinopathy of right upper extremity. Patient presents POD 2, in some pain, decreased elbow extension/flexion, decreased shoulder ROM, wrist ROM, and joint mobility.     Patient prognosis is Guarded.   Patient will benefit from skilled outpatient Physical Therapy to address the deficits stated above and in the chart below, provide patient /family education, and to maximize patientt's level of independence.     Plan of care discussed with patient: Yes  Patient's spiritual, cultural and educational needs considered and patient is agreeable to the plan of care and goals as stated below:     Anticipated Barriers for therapy: NONE    Medical Necessity is demonstrated by the following  History  Co-morbidities and personal factors that may impact the plan of care [x] LOW: no personal factors / co-morbidities  [] MODERATE: 1-2 personal factors / co-morbidities  [] HIGH: 3+ personal factors / co-morbidities    Moderate / High Support Documentation:   Co-morbidities affecting plan of care: see chart    Personal Factors:   age     Examination  Body Structures  and Functions, activity limitations and participation restrictions that may impact the plan of care [x] LOW: addressing 1-2 elements  [] MODERATE: 3+ elements  [] HIGH: 4+ elements (please support below)    Moderate / High Support Documentation:      Clinical Presentation [x] LOW: stable  [] MODERATE: Evolving  [] HIGH: Unstable     Decision Making/ Complexity Score: low       Goals:  Short Term Goals: 6 weeks   1. Indep with HEP  2. PROM R shoulder to WNL   3. AROM start at 6 weeks  4. Decrease pain </=5/10      Long Term Goals: 12-30 weeks   1. Indep with HEP   2. AROM R shoulder to WNL  3. Improve strength of scapula/cuff muscles to 4/5 pain-free  4. Normal mechanics of flexion/abd without scapular elevation   5. Pain-free AROM of R shoulder =/<1/10     Plan     Plan of care Certification: 3/14/2024 to 7/14/24.    Outpatient Physical Therapy 2 times weekly for 12+ weeks to include the following interventions: Gait Training, Manual Therapy, Moist Heat/ Ice, Neuromuscular Re-ed, Patient Education, Self Care, Therapeutic Activities, Therapeutic Exercise,  dry needling.     Janet Rivero, PT

## 2024-03-15 ENCOUNTER — TELEPHONE (OUTPATIENT)
Dept: SPORTS MEDICINE | Facility: CLINIC | Age: 39
End: 2024-03-15
Payer: COMMERCIAL

## 2024-03-15 ENCOUNTER — PATIENT MESSAGE (OUTPATIENT)
Dept: SPORTS MEDICINE | Facility: CLINIC | Age: 39
End: 2024-03-15
Payer: COMMERCIAL

## 2024-03-18 ENCOUNTER — CLINICAL SUPPORT (OUTPATIENT)
Dept: REHABILITATION | Facility: HOSPITAL | Age: 39
End: 2024-03-18
Payer: COMMERCIAL

## 2024-03-18 DIAGNOSIS — M25.611 DECREASED ROM OF RIGHT SHOULDER: Primary | ICD-10-CM

## 2024-03-18 DIAGNOSIS — M25.511 CHRONIC RIGHT SHOULDER PAIN: ICD-10-CM

## 2024-03-18 DIAGNOSIS — G89.29 CHRONIC RIGHT SHOULDER PAIN: ICD-10-CM

## 2024-03-18 PROCEDURE — 97140 MANUAL THERAPY 1/> REGIONS: CPT

## 2024-03-18 PROCEDURE — 97110 THERAPEUTIC EXERCISES: CPT

## 2024-03-18 NOTE — PROGRESS NOTES
[facility]   Physical Therapy Treatment Note      Name: Paul Fitzgerald  Clinic Number: [id number]    Therapy Diagnosis:   Encounter Diagnoses   Name Primary?    Decreased ROM of right shoulder Yes    Chronic right shoulder pain      Physician: Angie Parkinson*    Visit Date: 3/18/2024    Physician Orders: PT Eval and Treat   Medical Diagnosis from Referral: Traumatic complete tear of right rotator cuff, initial encounter     Biceps tendinopathy of right upper extremity  Evaluation Date: 3/14/2024  Authorization Period Expiration: 03/05/202412/31/2024  Plan of Care Expiration: 8/14/24  Progress Note Due: 4/14/24  Visit # / Visits authorized: 1 / 20     FOTO: 6%  FOTO 2:   FOTO 3:  DC FOTO @: 61%        Precautions: Standard      Time In: 1011 am   Time Out: 1057 am   Total Appointment Time (timed & untimed codes): 46 minutes     DATE OF PROCEDURE: Tues 3/12/2024   Right  1. Shoulder open subpectoral biceps tenodesis   2. Shoulder arthroscopic extensive debridement   3. Shoulder arthroscopic subacromial decompression     Physical Therapy: Follow the biceps tenodesis repair rehab protocol. PROM may start now.  Active range of motion of the elbow and shoulder may start in 1 week. No biceps resistive activity x 8 weeks. Sling immobilization for 3-4 weeks.        Subjective     Pt reports: hasn't been able to do 3x/day HEP but has done twice. Feels like she really wants to stretch it out. Staying on top of pain meds but can tell when it is almost time for next dose so knows it is working.   She was compliant with home exercise program.  Response to previous treatment: good, improved symptoms   Functional change: improved PROM, full elbow extension     Pain: 6/10  Location: RIGHT shoulder    Objective      Objective Measures updated at progress report unless specified.     Treatment     Paul received the treatments listed below:      Paul received the following manual therapy  "techniques: Joint mobilizations, Manual traction, Myofacial release, Soft tissue Mobilization, Friction Massage and Functional Dry Needling were applied for 23 minutes, including:  PROM R shoulder and elbow in pain-free range     Paul received therapeutic exercises to develop strength, endurance, ROM, flexibility, posture and core stabilization for 23 minutes including:   Supine Dowel ER   at neutral 20 x 3"  Table flexion walkbacks 10 x 3" ~ had pain   Table slides flex/scap 20 x 3"  Shrugs 20x  Scap retraction 20x   Pendulum   fwd/bckward 1'   Lateral 1'  Circles CW/CCW 1'       Patient Education and Home Exercises       Education provided:   - HEP  - post op precautions  - sling wear  - infection prevention  - NWB RUE    Written Home Exercises Provided: Patient instructed to cont prior HEP. Exercises were reviewed and Paul was able to demonstrate them prior to the end of the session.  Paul demonstrated good  understanding of the education provided. See EMR under Patient Instructions for exercises provided during therapy sessions    Assessment     Pt 6 days post-op. She presents with full elbow extension. Able to progress PROM exercises today. R shoulder PROM 110 deg flexion and 40 deg ER at neutral.    Paul Is progressing well towards her goals.   Pt prognosis is Guarded.     Pt will continue to benefit from skilled outpatient physical therapy to address the deficits listed in the problem list box on initial evaluation, provide pt/family education and to maximize pt's level of independence in the home and community environment.     Pt's spiritual, cultural and educational needs considered and pt agreeable to plan of care and goals.     Anticipated barriers to physical therapy: none    Goals:   Short Term Goals: 6 weeks   1. Indep with HEP  2. PROM R shoulder to WNL   3. AROM start at 6 weeks  4. Decrease pain </=5/10      Long Term Goals: 12-30 weeks   1. Indep with HEP   2. AROM R shoulder to WNL  3. " Improve strength of scapula/cuff muscles to 4/5 pain-free  4. Normal mechanics of flexion/abd without scapular elevation   5. Pain-free AROM of R shoulder =/<1/10     Plan     Continue with PT SELENA Rivero, PT

## 2024-03-21 ENCOUNTER — CLINICAL SUPPORT (OUTPATIENT)
Dept: REHABILITATION | Facility: HOSPITAL | Age: 39
End: 2024-03-21
Payer: COMMERCIAL

## 2024-03-21 DIAGNOSIS — G89.29 CHRONIC RIGHT SHOULDER PAIN: ICD-10-CM

## 2024-03-21 DIAGNOSIS — M25.611 DECREASED ROM OF RIGHT SHOULDER: Primary | ICD-10-CM

## 2024-03-21 DIAGNOSIS — M25.511 CHRONIC RIGHT SHOULDER PAIN: ICD-10-CM

## 2024-03-21 PROCEDURE — 97110 THERAPEUTIC EXERCISES: CPT

## 2024-03-21 PROCEDURE — 97140 MANUAL THERAPY 1/> REGIONS: CPT

## 2024-03-21 PROCEDURE — 97010 HOT OR COLD PACKS THERAPY: CPT

## 2024-03-21 NOTE — PROGRESS NOTES
[facility]   Physical Therapy Treatment Note      Name: Paul Fitzgerald  Clinic Number: [id number]    Therapy Diagnosis:   No diagnosis found.    Physician: Angie Parkinson*    Visit Date: 3/21/2024    Physician Orders: PT Eval and Treat   Medical Diagnosis from Referral: Traumatic complete tear of right rotator cuff, initial encounter     Biceps tendinopathy of right upper extremity  Evaluation Date: 3/14/2024  Authorization Period Expiration: 03/05/202412/31/2024  Plan of Care Expiration: 8/14/24  Progress Note Due: 4/14/24  Visit # / Visits authorized: 2 / 20     FOTO: 6%  FOTO 2:   FOTO 3:  DC FOTO @: 61%        Precautions: Standard      Time In: 100 pm   Time Out: 207 pm   Total Appointment Time (timed & untimed codes): 67 minutes     DATE OF PROCEDURE: Tues 3/12/2024   Right  1. Shoulder open subpectoral biceps tenodesis   2. Shoulder arthroscopic extensive debridement   3. Shoulder arthroscopic subacromial decompression     Physical Therapy: Follow the biceps tenodesis repair rehab protocol. PROM may start now.  Active range of motion of the elbow and shoulder may start in 1 week. No biceps resistive activity x 8 weeks. Sling immobilization for 3-4 weeks.        Subjective     Pt reports: hasn't been able to do 3x/day HEP but has done twice. Feels good not much pain.   She was compliant with home exercise program.  Response to previous treatment: good, improved symptoms   Functional change: improved PROM, full elbow extension     Pain: 6/10  Location: RIGHT shoulder    Objective      Objective Measures updated at progress report unless specified.     Treatment     Paul received the treatments listed below:      Paul received the following manual therapy techniques: Joint mobilizations, Manual traction, Myofacial release, Soft tissue Mobilization, Friction Massage and Functional Dry Needling were applied for 15 minutes, including:  PROM R shoulder and elbow in pain-free  "range     Paul received therapeutic exercises to develop strength, endurance, ROM, flexibility, posture and core stabilization for 40 minutes including:   Pendulum   fwd/bckward 1'   Lateral 1'  Circles CW/CCW 1'   LLLD bicep stretch over towel 2'  Supine Dowel ER   at neutral 20 x 3"  At 45 deg 20 x 3"  At 90 deg 20 x 3" ~ some pain   Table flexion walkbacks (bent elbow) 20 x 3"   Table slides flex/scap 20 x 3"  Theraball flex/scap on mat 20x  Shrugs 20x  Scap retraction 20x     Paul received cold pack for 7 minutes to R  shoulder.       Patient Education and Home Exercises       Education provided:   - HEP  - post op precautions  - sling wear  - infection prevention  - NWB RUE    Written Home Exercises Provided: Patient instructed to cont prior HEP. Exercises were reviewed and Paul was able to demonstrate them prior to the end of the session.  Paul demonstrated good  understanding of the education provided. See EMR under Patient Instructions for exercises provided during therapy sessions    Assessment     Pt 1 week 2 days post-op. She presents with full elbow extension. Able to progress PROM exercises today.80% full range AAROM. Not fully active yet, but pain-free.     Paul Is progressing well towards her goals.   Pt prognosis is Guarded.     Pt will continue to benefit from skilled outpatient physical therapy to address the deficits listed in the problem list box on initial evaluation, provide pt/family education and to maximize pt's level of independence in the home and community environment.     Pt's spiritual, cultural and educational needs considered and pt agreeable to plan of care and goals.     Anticipated barriers to physical therapy: none    Goals:   Short Term Goals: 6 weeks   1. Indep with HEP  2. PROM R shoulder to WNL   3. AROM start at 6 weeks  4. Decrease pain </=5/10      Long Term Goals: 12-30 weeks   1. Indep with HEP   2. AROM R shoulder to WNL  3. Improve strength of scapula/cuff " muscles to 4/5 pain-free  4. Normal mechanics of flexion/abd without scapular elevation   5. Pain-free AROM of R shoulder =/<1/10     Plan     Continue with PT SELENA Rivero, PT

## 2024-03-25 ENCOUNTER — CLINICAL SUPPORT (OUTPATIENT)
Dept: REHABILITATION | Facility: HOSPITAL | Age: 39
End: 2024-03-25
Payer: COMMERCIAL

## 2024-03-25 DIAGNOSIS — G89.29 CHRONIC RIGHT SHOULDER PAIN: ICD-10-CM

## 2024-03-25 DIAGNOSIS — M25.611 DECREASED ROM OF RIGHT SHOULDER: Primary | ICD-10-CM

## 2024-03-25 DIAGNOSIS — M25.511 CHRONIC RIGHT SHOULDER PAIN: ICD-10-CM

## 2024-03-25 PROCEDURE — 97110 THERAPEUTIC EXERCISES: CPT

## 2024-03-25 PROCEDURE — 97140 MANUAL THERAPY 1/> REGIONS: CPT

## 2024-03-25 NOTE — Clinical Note
Hey you see her Thursday. She had a little incident - where she accidentally used her post-op arm (RUE) to scoop some really frozen ice cream despite being in the sling ([facility]). She didn't hear a pop or anything but she does have some increased symptoms and a little less ROM today. I updated her ROM chart so ya'll can see. I know she is a smoker and she fits the bill to get stiff after this procedure so I'm hoping this isn't going to set us back because she was doing well and having hardly any pain prior to this. Wanted to point this out so you can make sure everything is still in tact on Thursday, as I didn't strength test.   Also her aquacel is kind of peeling up on the sides so she has tape on edges to keep in tact. I never know what you prefer to have us do in that situation. In future would you prefer us sending over immediately to change dressing even with the post-op this week?  Thanks for all you do! Janet

## 2024-03-25 NOTE — PROGRESS NOTES
"[facility]   Physical Therapy Treatment Note      Name: Paul Fitzgerald  Clinic Number: [id number]    Therapy Diagnosis:   Encounter Diagnoses   Name Primary?    Decreased ROM of right shoulder Yes    Chronic right shoulder pain        Physician: Angie Parkinson*    Visit Date: 3/25/2024    Physician Orders: PT Eval and Treat   Medical Diagnosis from Referral: Traumatic complete tear of right rotator cuff, initial encounter     Biceps tendinopathy of right upper extremity  Evaluation Date: 3/14/2024  Authorization Period Expiration: 03/05/202412/31/2024  Plan of Care Expiration: 8/14/24  Progress Note Due: 4/14/24  Visit # / Visits authorized: 3 / 20     FOTO: 6%  FOTO 2:   FOTO 3:  DC FOTO @: 61%        Precautions: Standard      Time In: 1005   Time Out: 1105 am   Total Appointment Time (timed & untimed codes): 60 minutes     DATE OF PROCEDURE: Tues 3/12/2024   Right  1. Shoulder open subpectoral biceps tenodesis   2. Shoulder arthroscopic extensive debridement   3. Shoulder arthroscopic subacromial decompression     Physical Therapy: Follow the biceps tenodesis repair rehab protocol. PROM may start now.  Active range of motion of the elbow and shoulder may start in 1 week. No biceps resistive activity x 8 weeks. Sling immobilization for 3-4 weeks.        Subjective     Pt reports: "I hope I didn't retear this thing." She said she was feeling no pain and just forgot about not using her hand Friday and accidentally used her hand (while in the sling) to scoop ice cream and she felt a lot of pain while twisting to use the . She didn't feel a pop but she does feel a lot of tightness/stiffness in the bicep region. Not enough to take pain pill but does have increased symptoms.  She was compliant with home exercise program.  Response to previous treatment: good, improved symptoms   Functional change: improved PROM, full elbow extension     Pain: 0/10  Location: RIGHT " "shoulder    Objective      Objective Measures updated at progress report unless specified.     Observation: no bruising visible, Aquacel is taped up on edges     Active Range of Motion:   Shoulder Right   Flexion 165   Abduction 160   ER at 20 Abd 45   ER at 90 Abd  20   IR at 45 Abd 20   IR at 90 Abd  10        Treatment     Paul received the treatments listed below:      Paul received the following manual therapy techniques: Joint mobilizations, Manual traction, Myofacial release, Soft tissue Mobilization, Friction Massage and Functional Dry Needling were applied for 20 minutes, including:  PROM R shoulder and elbow in pain-free range     Paul received therapeutic exercises to develop strength, endurance, ROM, flexibility, posture and core stabilization for 40 minutes including:   Pendulum 1#   fwd/bckward 1'   Lateral 1'  Circles CW/CCW 1'   LLLD bicep stretch over towel 1'  Supine Dowel ER   at neutral 20 x 3"  At 45 deg 20 x 3"  At 90 deg 20 x 3" ~ some pain   Table flexion walkbacks (bent elbow) 20 x 3"   Table slides flex/scap/abd 20 x 3"  Standing ER at wall 20x    Paul received cold pack for 7 minutes to R  shoulder.       Patient Education and Home Exercises       Education provided:   - HEP  - post op precautions  - sling wear  - infection prevention  - NWB RUE    Written Home Exercises Provided: Patient instructed to cont prior HEP. Exercises were reviewed and Paul was able to demonstrate them prior to the end of the session.  Paul demonstrated good  understanding of the education provided. See EMR under Patient Instructions for exercises provided during therapy sessions    Assessment     Pt 1 week 5 days post-op. She had an incident in which she used her surgical hand to scoop ice cream and felt a lot of pain. She presents with some decreased elbow extension, decreased PROM ER in all angles of abd. No bruising present - bicep strength not tested due to post-op timeline. Encouraged to keep in " sling and not use post-op UE. Not fully able to do active yet, maintained AAROM.     Paul Is progressing well towards her goals.   Pt prognosis is Guarded.     Pt will continue to benefit from skilled outpatient physical therapy to address the deficits listed in the problem list box on initial evaluation, provide pt/family education and to maximize pt's level of independence in the home and community environment.     Pt's spiritual, cultural and educational needs considered and pt agreeable to plan of care and goals.     Anticipated barriers to physical therapy: none    Goals:   Short Term Goals: 6 weeks   1. Indep with HEP  2. PROM R shoulder to WNL   3. AROM start at 6 weeks  4. Decrease pain </=5/10      Long Term Goals: 12-30 weeks   1. Indep with HEP   2. AROM R shoulder to WNL  3. Improve strength of scapula/cuff muscles to 4/5 pain-free  4. Normal mechanics of flexion/abd without scapular elevation   5. Pain-free AROM of R shoulder =/<1/10     Plan     Continue with PT SELENA Rivero, PT

## 2024-03-26 ENCOUNTER — OFFICE VISIT (OUTPATIENT)
Dept: SPORTS MEDICINE | Facility: CLINIC | Age: 39
End: 2024-03-26
Payer: COMMERCIAL

## 2024-03-26 VITALS
BODY MASS INDEX: 35.08 KG/M2 | SYSTOLIC BLOOD PRESSURE: 126 MMHG | DIASTOLIC BLOOD PRESSURE: 75 MMHG | HEART RATE: 83 BPM | HEIGHT: 58 IN | WEIGHT: 167.13 LBS

## 2024-03-26 DIAGNOSIS — Z98.890 S/P SHOULDER SURGERY: Primary | ICD-10-CM

## 2024-03-26 PROCEDURE — 1160F RVW MEDS BY RX/DR IN RCRD: CPT | Mod: CPTII,S$GLB,, | Performed by: PHYSICIAN ASSISTANT

## 2024-03-26 PROCEDURE — 3078F DIAST BP <80 MM HG: CPT | Mod: CPTII,S$GLB,, | Performed by: PHYSICIAN ASSISTANT

## 2024-03-26 PROCEDURE — 99024 POSTOP FOLLOW-UP VISIT: CPT | Mod: S$GLB,,, | Performed by: PHYSICIAN ASSISTANT

## 2024-03-26 PROCEDURE — 99999 PR PBB SHADOW E&M-EST. PATIENT-LVL IV: CPT | Mod: PBBFAC,,, | Performed by: PHYSICIAN ASSISTANT

## 2024-03-26 PROCEDURE — 3074F SYST BP LT 130 MM HG: CPT | Mod: CPTII,S$GLB,, | Performed by: PHYSICIAN ASSISTANT

## 2024-03-26 PROCEDURE — 1159F MED LIST DOCD IN RCRD: CPT | Mod: CPTII,S$GLB,, | Performed by: PHYSICIAN ASSISTANT

## 2024-03-26 NOTE — PROGRESS NOTES
S:Paul Fitzgerald presents for post-operative evaluation.     DATE OF PROCEDURE: 3/12/2024      OPERATION:   Right  1. Shoulder open subpectoral biceps tenodesis (CPT 34283)  2. Shoulder arthroscopic extensive debridement (CPT 53995)    3. Shoulder arthroscopic subacromial decompression    Paul Fitzgerald reports to be doing well 2wk s/p the above mentioned procedure. Denies fevers, chills, night sweats, chest pain, difficulty breathing, calf pain or tenderness. Going to PT 2xWeek at the [facility] with Janet. Seeing good progress daily. Pain levels are improving. Has d/c'd pain medication.     O: The incisions are healing well.  No signs of infection.  Sutures were removed. No significant pain or unusual tenderness.    A/P: Patient doing well. Incisions healing well. Ok to shower with incisions uncovered. No submerging at this point. Continue sling for another 1-2 weeks. Progress ROM as tolerated, both active and passive. I told her to start Meloxicam for the pain. She is traveling to [facility] with her mother. Discussed taking baby ASA once daily and foot pumps on the plane. Told her to continue the sling and make sure she is doing HEP. Nothing heavier in her hand than her phone. NO biceps resistance for another 6 weeks. Plan to follow the rehab plan as previously outlined. RTC in 4 weeks.     POSTOPERATIVE PLAN OF CARE:  -Patient will be discharged home according to protocol.  -Physical Therapy: Follow the biceps tenodesis repair rehab protocol. PROM may start now.  Active range of motion of the elbow and shoulder may start in 1 week. No biceps resistive activity x 8 weeks. Sling immobilization for 3-4 weeks.  -DVT prophylaxis: ASA 81 mg twice a day x 2 weeks.

## 2024-03-27 ENCOUNTER — CLINICAL SUPPORT (OUTPATIENT)
Dept: REHABILITATION | Facility: HOSPITAL | Age: 39
End: 2024-03-27
Payer: COMMERCIAL

## 2024-03-27 ENCOUNTER — PATIENT MESSAGE (OUTPATIENT)
Dept: SPORTS MEDICINE | Facility: CLINIC | Age: 39
End: 2024-03-27
Payer: COMMERCIAL

## 2024-03-27 DIAGNOSIS — S46.011A TRAUMATIC COMPLETE TEAR OF RIGHT ROTATOR CUFF, INITIAL ENCOUNTER: ICD-10-CM

## 2024-03-27 DIAGNOSIS — M25.511 CHRONIC RIGHT SHOULDER PAIN: ICD-10-CM

## 2024-03-27 DIAGNOSIS — M67.921 BICEPS TENDINOPATHY OF RIGHT UPPER EXTREMITY: ICD-10-CM

## 2024-03-27 DIAGNOSIS — M25.611 DECREASED ROM OF RIGHT SHOULDER: Primary | ICD-10-CM

## 2024-03-27 DIAGNOSIS — G89.29 CHRONIC RIGHT SHOULDER PAIN: ICD-10-CM

## 2024-03-27 PROCEDURE — 97140 MANUAL THERAPY 1/> REGIONS: CPT

## 2024-03-27 PROCEDURE — 97110 THERAPEUTIC EXERCISES: CPT

## 2024-03-27 RX ORDER — ASPIRIN 81 MG/1
81 TABLET ORAL 2 TIMES DAILY
Qty: 28 TABLET | Refills: 0 | Status: SHIPPED | OUTPATIENT
Start: 2024-03-27 | End: 2024-03-27 | Stop reason: SDUPTHER

## 2024-03-27 NOTE — PROGRESS NOTES
[facility]   Physical Therapy Treatment Note      Name: Paul Fitzgerald  Clinic Number: [id number]    Therapy Diagnosis:   No diagnosis found.      Physician: Angie Parkinson*    Visit Date: 3/27/2024    Physician Orders: PT Eval and Treat   Medical Diagnosis from Referral: Traumatic complete tear of right rotator cuff, initial encounter     Biceps tendinopathy of right upper extremity  Evaluation Date: 3/14/2024  Authorization Period Expiration: 03/05/202412/31/2024  Plan of Care Expiration: 8/14/24  Progress Note Due: 4/14/24  Visit # / Visits authorized: 4 / 20     FOTO: 6%  FOTO 2:   FOTO 3:  DC FOTO @: 61%        Precautions: Standard      Time In: 1015   Time Out: 1105 am   Total Appointment Time (timed & untimed codes): 50 minutes     DATE OF PROCEDURE: Tues 3/12/2024   Right  1. Shoulder open subpectoral biceps tenodesis   2. Shoulder arthroscopic extensive debridement   3. Shoulder arthroscopic subacromial decompression     Physical Therapy: Follow the biceps tenodesis repair rehab protocol. PROM may start now.  Active range of motion of the elbow and shoulder may start in 1 week. No biceps resistive activity x 8 weeks. Sling immobilization for 3-4 weeks.        Subjective     Pt reports: was in a lot of pain after her doctor aptmt yesterday. Could barely sleep had to take sling off for a little bit. 4/10 pain today. Not able to do HEP this am. Could not find mobic rx that PA recommended she start taking.   She was not compliant with home exercise program.  Response to previous treatment: good, improved symptoms   Functional change: improved PROM, full elbow extension     Pain: 4/10  Location: RIGHT shoulder    Objective      Objective Measures updated at progress report unless specified.     Observation: no bruising visible, Aquacel is taped up on edges     Active Range of Motion:   Shoulder Right   Flexion 165   Abduction 160   ER at 20 Abd 45   ER at 90 Abd  20   IR at  "45 Abd 20   IR at 90 Abd  10        Treatment     Paul received the treatments listed below:      Paul received the following manual therapy techniques: Joint mobilizations, Manual traction, Myofacial release, Soft tissue Mobilization, Friction Massage and Functional Dry Needling were applied for 25 minutes, including:  PROM R shoulder and elbow in pain-free range     Paul received therapeutic exercises to develop strength, endurance, ROM, flexibility, posture and core stabilization for 25 minutes including:   Pendulum 1#   fwd/bckward 1'   Lateral 1'  Circles CW/CCW 1'   Wall ER neutral walk out stretch 20x  Supine Dowel ER   At 45 deg 20 x 3"  At 90 deg 20 x 3" ~ some pain   Table flexion walkbacks (bent elbow) 20 x 3"     Not Today:   Table slides flex/scap/abd 20 x 3"      Patient Education and Home Exercises       Education provided:   - HEP  - post op precautions  - sling wear  - infection prevention  - NWB RUE    Written Home Exercises Provided: Patient instructed to cont prior HEP. Exercises were reviewed and Paul was able to demonstrate them prior to the end of the session.  Paul demonstrated good  understanding of the education provided. See EMR under Patient Instructions for exercises provided during therapy sessions    Assessment     Pt 2 weeks 1 day post-op. Pt presents in 4/10 pain after rough night sleep. Pain worse at night. PROM progressing, though not yet able to progress to AAROM. She is going to [facility] to visit mom tomorrow. Next aptmt in 1 week. Encouraged and emphasized importance with compliance to HEP and sling.   Paul Is progressing well towards her goals.   Pt prognosis is Guarded.     Pt will continue to benefit from skilled outpatient physical therapy to address the deficits listed in the problem list box on initial evaluation, provide pt/family education and to maximize pt's level of independence in the home and community environment.     Pt's spiritual, cultural " and educational needs considered and pt agreeable to plan of care and goals.     Anticipated barriers to physical therapy: none    Goals:   Short Term Goals: 6 weeks   1. Indep with HEP  2. PROM R shoulder to WNL   3. AROM start at 6 weeks  4. Decrease pain </=5/10      Long Term Goals: 12-30 weeks   1. Indep with HEP   2. AROM R shoulder to WNL  3. Improve strength of scapula/cuff muscles to 4/5 pain-free  4. Normal mechanics of flexion/abd without scapular elevation   5. Pain-free AROM of R shoulder =/<1/10     Plan     Continue with PT SELENA Rivero, PT

## 2024-03-28 RX ORDER — ASPIRIN 81 MG/1
81 TABLET ORAL 2 TIMES DAILY
Qty: 28 TABLET | Refills: 0 | Status: SHIPPED | OUTPATIENT
Start: 2024-03-28 | End: 2024-04-11

## 2024-04-03 ENCOUNTER — PATIENT MESSAGE (OUTPATIENT)
Dept: REHABILITATION | Facility: HOSPITAL | Age: 39
End: 2024-04-03

## 2024-04-03 ENCOUNTER — CLINICAL SUPPORT (OUTPATIENT)
Dept: REHABILITATION | Facility: HOSPITAL | Age: 39
End: 2024-04-03
Payer: COMMERCIAL

## 2024-04-03 DIAGNOSIS — G89.29 CHRONIC RIGHT SHOULDER PAIN: ICD-10-CM

## 2024-04-03 DIAGNOSIS — M25.611 DECREASED ROM OF RIGHT SHOULDER: Primary | ICD-10-CM

## 2024-04-03 DIAGNOSIS — M25.511 CHRONIC RIGHT SHOULDER PAIN: ICD-10-CM

## 2024-04-03 PROCEDURE — 97530 THERAPEUTIC ACTIVITIES: CPT

## 2024-04-03 PROCEDURE — 97112 NEUROMUSCULAR REEDUCATION: CPT

## 2024-04-03 PROCEDURE — 97110 THERAPEUTIC EXERCISES: CPT

## 2024-04-03 PROCEDURE — 97140 MANUAL THERAPY 1/> REGIONS: CPT

## 2024-04-03 NOTE — PROGRESS NOTES
[facility]   Physical Therapy Treatment Note      Name: Paul Fitzgerald  Clinic Number: [id number]    Therapy Diagnosis:   Encounter Diagnoses   Name Primary?    Decreased ROM of right shoulder Yes    Chronic right shoulder pain          Physician: Angie Parkinson    Visit Date: 4/3/2024    Physician Orders: PT Eval and Treat   Medical Diagnosis from Referral: Traumatic complete tear of right rotator cuff, initial encounter     Biceps tendinopathy of right upper extremity  Evaluation Date: 3/14/2024  Authorization Period Expiration: 03/05/202412/31/2024  Plan of Care Expiration: 8/14/24  Progress Note Due: 4/14/24  Visit # / Visits authorized: 5 / 20     FOTO: 6%  FOTO 2:   FOTO 3:  DC FOTO @: 61%        Precautions: Standard      Time In: 1111   Time Out: 1211 pm   Total Appointment Time (timed & untimed codes): 60 minutes     DATE OF PROCEDURE: Tues 3/12/2024   Right  1. Shoulder open subpectoral biceps tenodesis   2. Shoulder arthroscopic extensive debridement   3. Shoulder arthroscopic subacromial decompression     Physical Therapy: Follow the biceps tenodesis repair rehab protocol. PROM may start now.  Active range of motion of the elbow and shoulder may start in 1 week. No biceps resistive activity x 8 weeks. Sling immobilization for 3-4 weeks.        Subjective     Pt reports: she is feeling much better did her HEP on her trip and is noticing more ROM. Just tightness no pain today.   She was compliant with home exercise program.  Response to previous treatment: good, improved symptoms   Functional change: improved PROM, full elbow extension     Pain: 4/10  Location: RIGHT shoulder    Objective      Objective Measures updated at progress report unless specified.     Active Range of Motion:   Shoulder Right   Flexion 165   Abduction 160   ER at 20 Abd 45   ER at 90 Abd  20   IR at 45 Abd 20   IR at 90 Abd  10        Treatment     Paul received the treatments listed below:   "    Paul received the following manual therapy techniques: Joint mobilizations, Manual traction, Myofacial release, Soft tissue Mobilization, Friction Massage and Functional Dry Needling were applied for 15 minutes, including:  - PROM R shoulder and elbow in pain-free range   - inf/post glides GR II-IV    Paul received therapeutic exercises to develop strength, endurance, ROM, flexibility, posture and core stabilization for 15 minutes including:   Pendulum 2#   fwd/bckward 1'   Lateral 1'  Circles CW/CCW 1'   Wall ER neutral walk out stretch 20x  Supine   Dowel ER @ 90 deg 20 x 3"   Dowel chest Press 2 x 10    Paul participated in dynamic functional therapeutic activities to improve functional performance for 15 minutes, including:  For overhead reaching:   Table flexion walkbacks 20 x 3"  OTD Pulleys flex/scap 20x each      Paul participated in neuromuscular re-education activities to improve: Balance, Coordination, Kinesthetic, Sense, Proprioception and Posture for 15 minutes. The following activities were included:  Isometrics at wall - all directions 5" x 10  Prone Scap Retraction + ext 3 x 5      Next few visits:   1/2 foam dowel flexion  Walkouts IR/ER OTB 3 x 10  Side lying              ER 3 x 10              Flexion dowel 3 x 8              Abduction 3 x 8              Horizontal abduction 3 x 8    Patient Education and Home Exercises       Education provided:   - HEP  - post op precautions  - sling wear  - infection prevention  - NWB RUE    Written Home Exercises Provided: Patient instructed to cont prior HEP. Exercises were reviewed and Paul was able to demonstrate them prior to the end of the session.  Paul demonstrated good  understanding of the education provided. See EMR under Patient Instructions for exercises provided during therapy sessions    Assessment     Pt 3 weeks 1 day post-op. She is doing well but muscles were fatigued for overhead flexion. Added isometrics to address.    Paul " Is progressing well towards her goals.   Pt prognosis is Guarded.     Pt will continue to benefit from skilled outpatient physical therapy to address the deficits listed in the problem list box on initial evaluation, provide pt/family education and to maximize pt's level of independence in the home and community environment.     Pt's spiritual, cultural and educational needs considered and pt agreeable to plan of care and goals.     Anticipated barriers to physical therapy: none    Goals:   Short Term Goals: 6 weeks   1. Indep with HEP  2. PROM R shoulder to WNL   3. AROM start at 6 weeks  4. Decrease pain </=5/10      Long Term Goals: 12-30 weeks   1. Indep with HEP   2. AROM R shoulder to WNL  3. Improve strength of scapula/cuff muscles to 4/5 pain-free  4. Normal mechanics of flexion/abd without scapular elevation   5. Pain-free AROM of R shoulder =/<1/10     Plan     Continue with PT SELENA Rivero, PT

## 2024-04-05 ENCOUNTER — CLINICAL SUPPORT (OUTPATIENT)
Dept: REHABILITATION | Facility: HOSPITAL | Age: 39
End: 2024-04-05
Payer: COMMERCIAL

## 2024-04-05 DIAGNOSIS — M25.511 CHRONIC RIGHT SHOULDER PAIN: ICD-10-CM

## 2024-04-05 DIAGNOSIS — G89.29 CHRONIC RIGHT SHOULDER PAIN: ICD-10-CM

## 2024-04-05 DIAGNOSIS — M25.611 DECREASED ROM OF RIGHT SHOULDER: Primary | ICD-10-CM

## 2024-04-05 PROCEDURE — 97112 NEUROMUSCULAR REEDUCATION: CPT

## 2024-04-05 PROCEDURE — 97140 MANUAL THERAPY 1/> REGIONS: CPT

## 2024-04-05 PROCEDURE — 97110 THERAPEUTIC EXERCISES: CPT

## 2024-04-05 PROCEDURE — 97530 THERAPEUTIC ACTIVITIES: CPT

## 2024-04-05 NOTE — PROGRESS NOTES
[facility]   Physical Therapy Treatment Note      Name: Paul Fitzgerald  Clinic Number: [id number]    Therapy Diagnosis:   No diagnosis found.        Physician: Angie Parkinson*    Visit Date: 4/5/2024    Physician Orders: PT Eval and Treat   Medical Diagnosis from Referral: Traumatic complete tear of right rotator cuff, initial encounter     Biceps tendinopathy of right upper extremity  Evaluation Date: 3/14/2024  Authorization Period Expiration: 03/05/202412/31/2024  Plan of Care Expiration: 8/14/24  Progress Note Due: 4/14/24  Visit # / Visits authorized: 6 / 20     FOTO: 6%  FOTO 2:   FOTO 3:  DC FOTO @: 61%        Precautions: Standard      Time In: 100 pm    Time Out: 200 pm   Total Appointment Time (timed & untimed codes): 60 minutes     DATE OF PROCEDURE: Tues 3/12/2024   Right  1. Shoulder open subpectoral biceps tenodesis   2. Shoulder arthroscopic extensive debridement   3. Shoulder arthroscopic subacromial decompression     Physical Therapy: Follow the biceps tenodesis repair rehab protocol. PROM may start now.  Active range of motion of the elbow and shoulder may start in 1 week. No biceps resistive activity x 8 weeks. Sling immobilization for 3-4 weeks.        Subjective     Pt reports: she is feeling much better did her HEP on her trip and is noticing more ROM. Just tightness no pain today.   She was compliant with home exercise program.  Response to previous treatment: good, improved symptoms   Functional change: improved PROM, full elbow extension     Pain: 4/10  Location: RIGHT shoulder    Objective      Objective Measures updated at progress report unless specified.     Active Range of Motion:   Shoulder Right   Flexion 165   Abduction 160   ER at 20 Abd 45   ER at 90 Abd  40   IR at 45 Abd 20   IR at 90 Abd  10        Treatment     Paul received the treatments listed below:      Paul received the following manual therapy techniques: Joint mobilizations,  "Manual traction, Myofacial release, Soft tissue Mobilization, Friction Massage and Functional Dry Needling were applied for 15 minutes, including:  - PROM R shoulder and elbow in pain-free range   - inf/post glides GR II-IV    Paul received therapeutic exercises to develop strength, endurance, ROM, flexibility, posture and core stabilization for 15 minutes including:   Supine   Dowel ER @ 90 deg 20 x 3"     Side lying              ER 3 x 10              Flexion dowel 3 x 8              Abduction 3 x 8              Horizontal abduction 3 x 8    Paul participated in dynamic functional therapeutic activities to improve functional performance for 15 minutes, including:  For overhead reaching:   Table flexion walkbacks 20 x 3"  OTD Pulleys flex/scap 20x each   Supine 1/2 foam dowel flexion 2 x 10    Paul participated in neuromuscular re-education activities to improve: Balance, Coordination, Kinesthetic, Sense, Proprioception and Posture for 15 minutes. The following activities were included:    Prone Scap Retraction + ext 3 x 8  Walkouts IR/ER OTB 3 x 10    Next 1-2 visits:   Landmine 0# 3 x 10   IR OTB 3 x 10  Pulley IR 3 x 10      Patient Education and Home Exercises       Education provided:   - HEP  - post op precautions  - sling wear  - infection prevention  - NWB RUE    Written Home Exercises Provided: Patient instructed to cont prior HEP. Exercises were reviewed and Paul was able to demonstrate them prior to the end of the session.  Paul demonstrated good  understanding of the education provided. See EMR under Patient Instructions for exercises provided during therapy sessions    Assessment     Pt 3 weeks 3 day post-op. Able to progress to AAROM today. Continue to progress per protocol    Paul Is progressing well towards her goals.   Pt prognosis is Guarded.     Pt will continue to benefit from skilled outpatient physical therapy to address the deficits listed in the problem list box on initial " evaluation, provide pt/family education and to maximize pt's level of independence in the home and community environment.     Pt's spiritual, cultural and educational needs considered and pt agreeable to plan of care and goals.     Anticipated barriers to physical therapy: none    Goals:   Short Term Goals: 6 weeks   1. Indep with HEP  2. PROM R shoulder to WNL   3. AROM start at 6 weeks  4. Decrease pain </=5/10      Long Term Goals: 12-30 weeks   1. Indep with HEP   2. AROM R shoulder to WNL  3. Improve strength of scapula/cuff muscles to 4/5 pain-free  4. Normal mechanics of flexion/abd without scapular elevation   5. Pain-free AROM of R shoulder =/<1/10     Plan     Continue with PT SELENA Rivero, PT

## 2024-04-09 ENCOUNTER — CLINICAL SUPPORT (OUTPATIENT)
Dept: REHABILITATION | Facility: HOSPITAL | Age: 39
End: 2024-04-09
Payer: COMMERCIAL

## 2024-04-09 DIAGNOSIS — M25.511 CHRONIC RIGHT SHOULDER PAIN: ICD-10-CM

## 2024-04-09 DIAGNOSIS — G89.29 CHRONIC RIGHT SHOULDER PAIN: ICD-10-CM

## 2024-04-09 DIAGNOSIS — M25.611 DECREASED ROM OF RIGHT SHOULDER: Primary | ICD-10-CM

## 2024-04-09 PROCEDURE — 97140 MANUAL THERAPY 1/> REGIONS: CPT

## 2024-04-09 PROCEDURE — 97530 THERAPEUTIC ACTIVITIES: CPT

## 2024-04-09 PROCEDURE — 97112 NEUROMUSCULAR REEDUCATION: CPT

## 2024-04-09 PROCEDURE — 97110 THERAPEUTIC EXERCISES: CPT

## 2024-04-09 NOTE — PROGRESS NOTES
[facility]   Physical Therapy Treatment Note      Name: Paul Fitzgerald  Clinic Number: [id number]    Therapy Diagnosis:   No diagnosis found.        Physician: Angie Parkinson*    Visit Date: 4/9/2024    Physician Orders: PT Eval and Treat   Medical Diagnosis from Referral: Traumatic complete tear of right rotator cuff, initial encounter     Biceps tendinopathy of right upper extremity  Evaluation Date: 3/14/2024  Authorization Period Expiration: 03/05/202412/31/2024  Plan of Care Expiration: 8/14/24  Progress Note Due: 4/14/24  Visit # / Visits authorized: 7 / 20     FOTO: 6%  FOTO 2:   FOTO 3:  DC FOTO @: 61%        Precautions: Standard      Time In: 1105 am    Time Out: 1200 pm   Total Appointment Time (timed & untimed codes): 55 minutes     DATE OF PROCEDURE: Tues 3/12/2024   Right  1. Shoulder open subpectoral biceps tenodesis   2. Shoulder arthroscopic extensive debridement   3. Shoulder arthroscopic subacromial decompression     Physical Therapy: Follow the biceps tenodesis repair rehab protocol. PROM may start now.  Active range of motion of the elbow and shoulder may start in 1 week. No biceps resistive activity x 8 weeks. Sling immobilization for 3-4 weeks.        Subjective     Pt reports: she has begun self weening herself and puts on the sling when she gets sore. It has been feeling overall better. Said she sleeps without it.     She was compliant with home exercise program.  Response to previous treatment: good, improved symptoms   Functional change: improved PROM, full elbow extension     Pain: 4/10  Location: RIGHT shoulder    Objective      Objective Measures updated at progress report unless specified.     Active Range of Motion:   Shoulder Right   Flexion 165   Abduction 160   ER at 20 Abd 45   ER at 90 Abd  40   IR at 45 Abd 20   IR at 90 Abd  10        Treatment     Paul received the treatments listed below:      Paul received the following manual  "therapy techniques: Joint mobilizations, Manual traction, Myofacial release, Soft tissue Mobilization, Friction Massage and Functional Dry Needling were applied for 10 minutes, including:  - PROM R shoulder and elbow in pain-free range   - inf/post glides GR II-IV    Paul received therapeutic exercises to develop strength, endurance, ROM, flexibility, posture and core stabilization for 15 minutes including:   Side lying              ER 3 x 10              Flexion dowel 3 x 8              Abduction 3 x 8              Horizontal abduction 3 x 8  Pulley IR 3 x 10  ER walkout stretch at wall 30x    Paul participated in dynamic functional therapeutic activities to improve functional performance for 15 minutes, including:  For overhead reaching:   Table flexion walkbacks 20 x 3"  OTD Pulleys flex/scap 20x each   Supine 1/2 foam dowel flexion 2 x 10  Landmine 0# 3 x 8     Paul participated in neuromuscular re-education activities to improve: Balance, Coordination, Kinesthetic, Sense, Proprioception and Posture for 15 minutes. The following activities were included:    Prone Scap Retraction + ext 3 x 8  Walkouts IR/ER OTB 3 x 10    Next Visit:  IR OTB 3 x 10      Patient Education and Home Exercises       Education provided:   - HEP  - post op precautions  - sling wear  - infection prevention  - NWB RUE    Written Home Exercises Provided: Patient instructed to cont prior HEP. Exercises were reviewed and Paul was able to demonstrate them prior to the end of the session.  Paul demonstrated good  understanding of the education provided. See EMR under Patient Instructions for exercises provided during therapy sessions    Assessment     Pt 4 weeks post-op. Had already begun self weening sling on her own and sleeping without sling. She can now start that per PT but sling when she gets sore. Advised to listen to symptoms. Continue to progress per protocol    Paul Is progressing well towards her goals.   Pt prognosis is " Guarded.     Pt will continue to benefit from skilled outpatient physical therapy to address the deficits listed in the problem list box on initial evaluation, provide pt/family education and to maximize pt's level of independence in the home and community environment.     Pt's spiritual, cultural and educational needs considered and pt agreeable to plan of care and goals.     Anticipated barriers to physical therapy: none    Goals:   Short Term Goals: 6 weeks   1. Indep with HEP  2. PROM R shoulder to WNL   3. AROM start at 6 weeks  4. Decrease pain </=5/10      Long Term Goals: 12-30 weeks   1. Indep with HEP   2. AROM R shoulder to WNL  3. Improve strength of scapula/cuff muscles to 4/5 pain-free  4. Normal mechanics of flexion/abd without scapular elevation   5. Pain-free AROM of R shoulder =/<1/10     Plan     Continue with PT SELENA Rivero PT

## 2024-04-12 ENCOUNTER — CLINICAL SUPPORT (OUTPATIENT)
Dept: REHABILITATION | Facility: HOSPITAL | Age: 39
End: 2024-04-12
Payer: COMMERCIAL

## 2024-04-12 DIAGNOSIS — M25.611 DECREASED ROM OF RIGHT SHOULDER: Primary | ICD-10-CM

## 2024-04-12 DIAGNOSIS — G89.29 CHRONIC RIGHT SHOULDER PAIN: ICD-10-CM

## 2024-04-12 DIAGNOSIS — M25.511 CHRONIC RIGHT SHOULDER PAIN: ICD-10-CM

## 2024-04-12 PROCEDURE — 97530 THERAPEUTIC ACTIVITIES: CPT

## 2024-04-12 PROCEDURE — 97110 THERAPEUTIC EXERCISES: CPT

## 2024-04-12 PROCEDURE — 97112 NEUROMUSCULAR REEDUCATION: CPT

## 2024-04-12 PROCEDURE — 97140 MANUAL THERAPY 1/> REGIONS: CPT

## 2024-04-12 NOTE — PROGRESS NOTES
[facility]   Physical Therapy Treatment Note      Name: Paul Fitzgerlad  Clinic Number: [id number]    Therapy Diagnosis:   No diagnosis found.        Physician: Angie Parkinson*    Visit Date: 4/12/2024    Physician Orders: PT Eval and Treat   Medical Diagnosis from Referral: Traumatic complete tear of right rotator cuff, initial encounter     Biceps tendinopathy of right upper extremity  Evaluation Date: 3/14/2024  Authorization Period Expiration: 03/05/202412/31/2024  Plan of Care Expiration: 8/14/24  Progress Note Due: 4/14/24  Visit # / Visits authorized: 8 / 20     FOTO: 6%  FOTO 2:   FOTO 3:  DC FOTO @: 61%        Precautions: Standard      Time In: 1000 am    Time Out: 1053 pm   Total Appointment Time (timed & untimed codes): 53 minutes     DATE OF PROCEDURE: Tues 3/12/2024   Right  1. Shoulder open subpectoral biceps tenodesis   2. Shoulder arthroscopic extensive debridement   3. Shoulder arthroscopic subacromial decompression     Physical Therapy: Follow the biceps tenodesis repair rehab protocol. PROM may start now.  Active range of motion of the elbow and shoulder may start in 1 week. No biceps resistive activity x 8 weeks. Sling immobilization for 3-4 weeks.        Subjective     Pt reports: was sore after last session but has not been in sling for 2 days is sleeping so much better without it and in no pain.     She was compliant with home exercise program.  Response to previous treatment: good, improved symptoms   Functional change: improved PROM, full elbow extension     Pain: 0/10  Location: RIGHT shoulder    Objective      Objective Measures updated at progress report unless specified.     Active Range of Motion:   Shoulder Right   Flexion 165   Abduction 160   ER at 20 Abd 45   ER at 90 Abd  40   IR at 45 Abd 20   IR at 90 Abd  10        Treatment     Paul received the treatments listed below:      Paul received the following manual therapy techniques: Joint  "mobilizations, Manual traction, Myofacial release, Soft tissue Mobilization, Friction Massage and Functional Dry Needling were applied for 10 minutes, including:  - PROM R shoulder and elbow in pain-free range   - inf/post glides GR II-IV    Paul received therapeutic exercises to develop strength, endurance, ROM, flexibility, posture and core stabilization for 13 minutes including:   Side lying              ER 3 x 10              Flexion dowel 3 x 8              Abduction 3 x 8              Horizontal abduction 3 x 8  Pulley IR 3 x 10  ER walkout stretch at wall 30x    Paul participated in dynamic functional therapeutic activities to improve functional performance for 15 minutes, including:  For overhead reaching:   Table flexion walkbacks 20 x 3"  OTD Pulleys flex/scap 20x each   Supine 1/2 foam dowel flexion 2 x 10  Landmine 0# 3 x 8     Paul participated in neuromuscular re-education activities to improve: Balance, Coordination, Kinesthetic, Sense, Proprioception and Posture for 15 minutes. The following activities were included:  Prone   Scap Retraction + ext 3 x 8  T 3 x 10  Walkouts IR/ER OTB 3 x 10  IR OTB 3 x 10      Patient Education and Home Exercises       Education provided:   - HEP  - post op precautions  - sling wear  - infection prevention  - NWB RUE    Written Home Exercises Provided: Patient instructed to cont prior HEP. Exercises were reviewed and Paul was able to demonstrate them prior to the end of the session.  Paul demonstrated good  understanding of the education provided. See EMR under Patient Instructions for exercises provided during therapy sessions    Assessment     Pt 4 weeks 3 days post-op. Feeling good completely out of brace. Continue to progress per protocol    Paul Is progressing well towards her goals.   Pt prognosis is Guarded.     Pt will continue to benefit from skilled outpatient physical therapy to address the deficits listed in the problem list box on initial " evaluation, provide pt/family education and to maximize pt's level of independence in the home and community environment.     Pt's spiritual, cultural and educational needs considered and pt agreeable to plan of care and goals.     Anticipated barriers to physical therapy: none    Goals:   Short Term Goals: 6 weeks   1. Indep with HEP  2. PROM R shoulder to WNL   3. AROM start at 6 weeks  4. Decrease pain </=5/10      Long Term Goals: 12-30 weeks   1. Indep with HEP   2. AROM R shoulder to WNL  3. Improve strength of scapula/cuff muscles to 4/5 pain-free  4. Normal mechanics of flexion/abd without scapular elevation   5. Pain-free AROM of R shoulder =/<1/10     Plan     Continue with PT SELENA Rivero, PT

## 2024-04-15 ENCOUNTER — CLINICAL SUPPORT (OUTPATIENT)
Dept: REHABILITATION | Facility: HOSPITAL | Age: 39
End: 2024-04-15
Payer: COMMERCIAL

## 2024-04-15 DIAGNOSIS — M25.611 DECREASED ROM OF RIGHT SHOULDER: Primary | ICD-10-CM

## 2024-04-15 DIAGNOSIS — G89.29 CHRONIC RIGHT SHOULDER PAIN: ICD-10-CM

## 2024-04-15 DIAGNOSIS — M25.511 CHRONIC RIGHT SHOULDER PAIN: ICD-10-CM

## 2024-04-15 PROCEDURE — 97530 THERAPEUTIC ACTIVITIES: CPT

## 2024-04-15 PROCEDURE — 97112 NEUROMUSCULAR REEDUCATION: CPT

## 2024-04-15 PROCEDURE — 97110 THERAPEUTIC EXERCISES: CPT

## 2024-04-15 PROCEDURE — 97140 MANUAL THERAPY 1/> REGIONS: CPT

## 2024-04-15 NOTE — PROGRESS NOTES
[facility]   Physical Therapy Treatment Note      Name: Paul Fitzgerald  Clinic Number: [id number]    Therapy Diagnosis:   No diagnosis found.        Physician: Angie Parkinson*    Visit Date: 4/15/2024    Physician Orders: PT Eval and Treat   Medical Diagnosis from Referral: Traumatic complete tear of right rotator cuff, initial encounter     Biceps tendinopathy of right upper extremity  Evaluation Date: 3/14/2024  Authorization Period Expiration: 03/05/202412/31/2024  Plan of Care Expiration: 8/14/24  Progress Note Due: 4/14/24  Visit # / Visits authorized: 9 / 20     FOTO: 6%  FOTO 2:   FOTO 3:  DC FOTO @: 61%        Precautions: Standard      Time In: 900 am    Time Out: 954 am   Total Appointment Time (timed & untimed codes): 54 minutes     DATE OF PROCEDURE: Tues 3/12/2024   Right  1. Shoulder open subpectoral biceps tenodesis   2. Shoulder arthroscopic extensive debridement   3. Shoulder arthroscopic subacromial decompression     Physical Therapy: Follow the biceps tenodesis repair rehab protocol. PROM may start now.  Active range of motion of the elbow and shoulder may start in 1 week. No biceps resistive activity x 8 weeks. Sling immobilization for 3-4 weeks.        Subjective     Pt reports: was sore after last session but has not been in sling for 2 days is sleeping so much better without it and in no pain.     She was compliant with home exercise program.  Response to previous treatment: good, improved symptoms   Functional change: improved PROM, full elbow extension     Pain: 0/10  Location: RIGHT shoulder    Objective      Objective Measures updated at progress report unless specified.     Active Range of Motion:   Shoulder Right   Flexion 165   Abduction 160   ER at 20 Abd 45   ER at 90 Abd  40   IR at 45 Abd 20   IR at 90 Abd  10        Treatment     Paul received the treatments listed below:      Paul received the following manual therapy techniques: Joint  "mobilizations, Manual traction, Myofacial release, Soft tissue Mobilization, Friction Massage and Functional Dry Needling were applied for 9 minutes, including:  - PROM R shoulder and elbow in pain-free range   - inf/post glides GR II-IV    Paul received therapeutic exercises to develop strength, endurance, ROM, flexibility, posture and core stabilization for 15 minutes including:   Side lying              ER 1# 3 x 10              Flexion dowel 3 x 8              Abduction 1# 3 x 8              Horizontal abduction 1# 3 x 8  Pulley IR 3 x 10  ER walkout stretch at wall 20x    Paul participated in dynamic functional therapeutic activities to improve functional performance for 15 minutes, including:  For overhead reaching:   Wall slides 30 x 3"  OTD Pulleys flex/scap 20x each   Supine 1/2 foam dowel flexion 2 x 10  Landmine 0# 3 x 8     Paul participated in neuromuscular re-education activities to improve: Balance, Coordination, Kinesthetic, Sense, Proprioception and Posture for 15 minutes. The following activities were included:  Prone   Scap Retraction + ext 3 x 8  T 1# 3 x 10  Row 1# 3 x 10  IR/ER OTB 3 x 10      Patient Education and Home Exercises       Education provided:   - HEP  - post op precautions  - sling wear  - infection prevention  - NWB RUE    Written Home Exercises Provided: Patient instructed to cont prior HEP. Exercises were reviewed and Paul was able to demonstrate them prior to the end of the session.  Paul demonstrated good  understanding of the education provided. See EMR under Patient Instructions for exercises provided during therapy sessions    Assessment     Did well today, no increased symptoms. Continue to progress per protocol    Paul Is progressing well towards her goals.   Pt prognosis is Guarded.     Pt will continue to benefit from skilled outpatient physical therapy to address the deficits listed in the problem list box on initial evaluation, provide pt/family education " and to maximize pt's level of independence in the home and community environment.     Pt's spiritual, cultural and educational needs considered and pt agreeable to plan of care and goals.     Anticipated barriers to physical therapy: none    Goals:   Short Term Goals: 6 weeks   1. Indep with HEP  2. PROM R shoulder to WNL   3. AROM start at 6 weeks  4. Decrease pain </=5/10      Long Term Goals: 12-30 weeks   1. Indep with HEP   2. AROM R shoulder to WNL  3. Improve strength of scapula/cuff muscles to 4/5 pain-free  4. Normal mechanics of flexion/abd without scapular elevation   5. Pain-free AROM of R shoulder =/<1/10     Plan     Continue with PT SELENA Rivero PT

## 2024-04-20 NOTE — PROGRESS NOTES
S:Paul Fitzgerald presents for post-operative evaluation.     DATE OF PROCEDURE: 3/12/2024   OPERATION:   Right  1. Shoulder open subpectoral biceps tenodesis (CPT 68115)  2. Shoulder arthroscopic extensive debridement (CPT 67873)    3. Shoulder arthroscopic subacromial decompression    Paul Fitzgerald reports to be doing well 6 weeks s/p the above mentioned procedure. Going to PT at the [facility] location with Janet. Seeing good progress daily. Pain levels are improving. She flew to [facility] over Easter holiday.  Doing well.  No complaints.  Pleased with the progress to this point.    O:  Exam of the right shoulder demonstrates well-healed arthroscopic portals.  No biceps deformity.  Passive forward elevation to 160° with relative ease.  Soft endpoint.  Active forward elevation to 140 degrees.  No scapular winging.  Mild dyskinesis.  Active external rotation with arm at side to 50°.  Passive external rotation with arm at side to 60°.    A/P: Arthroscopic pictures were reviewed with the patient.  No significant tightness.  Good cuff activation.  Continue rehab to normalize active and passive range of motion.  May begin resisted biceps exercises in 2 weeks.  Cuff strengthening exercises may begin now.  All questions answered.  We will see her back in 6 weeks for routine recheck.

## 2024-04-23 ENCOUNTER — OFFICE VISIT (OUTPATIENT)
Dept: SPORTS MEDICINE | Facility: CLINIC | Age: 39
End: 2024-04-23
Payer: COMMERCIAL

## 2024-04-23 VITALS
BODY MASS INDEX: 36 KG/M2 | DIASTOLIC BLOOD PRESSURE: 66 MMHG | SYSTOLIC BLOOD PRESSURE: 97 MMHG | HEART RATE: 88 BPM | HEIGHT: 58 IN | WEIGHT: 171.5 LBS

## 2024-04-23 DIAGNOSIS — Z98.890 S/P SHOULDER SURGERY: Primary | ICD-10-CM

## 2024-04-23 PROCEDURE — 3074F SYST BP LT 130 MM HG: CPT | Mod: CPTII,S$GLB,, | Performed by: ORTHOPAEDIC SURGERY

## 2024-04-23 PROCEDURE — 99024 POSTOP FOLLOW-UP VISIT: CPT | Mod: S$GLB,,, | Performed by: ORTHOPAEDIC SURGERY

## 2024-04-23 PROCEDURE — 99999 PR PBB SHADOW E&M-EST. PATIENT-LVL IV: CPT | Mod: PBBFAC,,, | Performed by: ORTHOPAEDIC SURGERY

## 2024-04-23 PROCEDURE — 1159F MED LIST DOCD IN RCRD: CPT | Mod: CPTII,S$GLB,, | Performed by: ORTHOPAEDIC SURGERY

## 2024-04-23 PROCEDURE — 3078F DIAST BP <80 MM HG: CPT | Mod: CPTII,S$GLB,, | Performed by: ORTHOPAEDIC SURGERY

## 2024-04-26 ENCOUNTER — CLINICAL SUPPORT (OUTPATIENT)
Dept: REHABILITATION | Facility: HOSPITAL | Age: 39
End: 2024-04-26
Payer: COMMERCIAL

## 2024-04-26 DIAGNOSIS — M25.611 DECREASED ROM OF RIGHT SHOULDER: Primary | ICD-10-CM

## 2024-04-26 DIAGNOSIS — G89.29 CHRONIC RIGHT SHOULDER PAIN: ICD-10-CM

## 2024-04-26 DIAGNOSIS — M25.511 CHRONIC RIGHT SHOULDER PAIN: ICD-10-CM

## 2024-04-26 PROCEDURE — 97110 THERAPEUTIC EXERCISES: CPT

## 2024-04-26 PROCEDURE — 97140 MANUAL THERAPY 1/> REGIONS: CPT

## 2024-04-26 PROCEDURE — 97112 NEUROMUSCULAR REEDUCATION: CPT

## 2024-04-26 PROCEDURE — 97530 THERAPEUTIC ACTIVITIES: CPT

## 2024-04-26 NOTE — PROGRESS NOTES
[facility]   Physical Therapy Treatment Note      Name: Paul Fitzgerald  Clinic Number: [id number]    Therapy Diagnosis:   Encounter Diagnoses   Name Primary?    Decreased ROM of right shoulder Yes    Chronic right shoulder pain      Physician: Angie Parkinson*    Visit Date: 4/26/2024    Physician Orders: PT Eval and Treat   Medical Diagnosis from Referral: Traumatic complete tear of right rotator cuff, initial encounter     Biceps tendinopathy of right upper extremity  Evaluation Date: 3/14/2024  Authorization Period Expiration: 03/05/202412/31/2024  Plan of Care Expiration: 8/14/24  Progress Note Due: 4/14/24  Visit # / Visits authorized: 10 / 20     FOTO: 6%  FOTO 2:   FOTO 3:  DC FOTO @: 61%        Precautions: Standard      Time In: 1005 am    Time Out: 1100 am   Total Appointment Time (timed & untimed codes): 55 minutes     DATE OF PROCEDURE: Tues 3/12/2024   Right  1. Shoulder open subpectoral biceps tenodesis   2. Shoulder arthroscopic extensive debridement   3. Shoulder arthroscopic subacromial decompression     Physical Therapy: Follow the biceps tenodesis repair rehab protocol. PROM may start now.  Active range of motion of the elbow and shoulder may start in 1 week. No biceps resistive activity x 8 weeks. Sling immobilization for 3-4 weeks.        Subjective     Pt reports: Dr. Noyola said she was doing well. She hasn't done much HEP due to time/life. No pain    She was NOT compliant with home exercise program.  Response to previous treatment: good, improved symptoms   Functional change: improved PROM, full elbow extension     Pain: 0/10  Location: RIGHT shoulder    Objective      Objective Measures updated at progress report unless specified.     Active Range of Motion:   Shoulder Right   Flexion 165   Abduction 160   ER at 20 Abd 45   ER at 90 Abd  40   IR at 45 Abd 20   IR at 90 Abd  10        Treatment     Paul received the treatments listed below:      Paul  received the following manual therapy techniques: Joint mobilizations, Manual traction, Myofacial release, Soft tissue Mobilization, Friction Massage and Functional Dry Needling were applied for 10 minutes, including:  - PROM R shoulder and elbow in pain-free range   - inf/post glides GR II-IV    Paul received therapeutic exercises to develop strength, endurance, ROM, flexibility, posture and core stabilization for 15 minutes including:   Side lying              ER 1# 3 x 10              Abduction 1# 3 x 8              Horizontal abduction 1# 3 x 8  Cane IR 3 x 10      Paul participated in dynamic functional therapeutic activities to improve functional performance for 15 minutes, including:  For overhead reaching:   Dowel flexion / scaptino 3 x 10 each  Landmine 2# 3 x 8     Paul participated in neuromuscular re-education activities to improve: Balance, Coordination, Kinesthetic, Sense, Proprioception and Posture for 15 minutes. The following activities were included:  Prone   Scap Retraction + ext 3 x 8  T 1# 3 x 10  Row 1# 3 x 10  IR/ER OTB 3 x 10      Patient Education and Home Exercises       Education provided:   - HEP  - post op precautions  - sling wear  - infection prevention  - NWB RUE    Written Home Exercises Provided: Patient instructed to cont prior HEP. Exercises were reviewed and Paul was able to demonstrate them prior to the end of the session.  Paul demonstrated good  understanding of the education provided. See EMR under Patient Instructions for exercises provided during therapy sessions    Assessment     Patient shoulder fatigued by end of session. Continue to progress strength    Paul Is progressing well towards her goals.   Pt prognosis is Guarded.     Pt will continue to benefit from skilled outpatient physical therapy to address the deficits listed in the problem list box on initial evaluation, provide pt/family education and to maximize pt's level of independence in the home and  community environment.     Pt's spiritual, cultural and educational needs considered and pt agreeable to plan of care and goals.     Anticipated barriers to physical therapy: none    Goals:   Short Term Goals: 6 weeks  - MET   1. Indep with HEP  2. PROM R shoulder to WNL   3. AROM start at 6 weeks  4. Decrease pain </=5/10      Long Term Goals: 12-30 weeks   1. Indep with HEP   2. AROM R shoulder to WNL  3. Improve strength of scapula/cuff muscles to 4/5 pain-free  4. Normal mechanics of flexion/abd without scapular elevation   5. Pain-free AROM of R shoulder =/<1/10     Plan     Continue with PT SELENA Rivero, PT

## 2024-04-29 ENCOUNTER — PATIENT MESSAGE (OUTPATIENT)
Dept: REHABILITATION | Facility: HOSPITAL | Age: 39
End: 2024-04-29
Payer: COMMERCIAL

## 2024-04-30 ENCOUNTER — CLINICAL SUPPORT (OUTPATIENT)
Dept: REHABILITATION | Facility: HOSPITAL | Age: 39
End: 2024-04-30
Payer: COMMERCIAL

## 2024-04-30 DIAGNOSIS — M25.611 DECREASED ROM OF RIGHT SHOULDER: Primary | ICD-10-CM

## 2024-04-30 DIAGNOSIS — M25.511 CHRONIC RIGHT SHOULDER PAIN: ICD-10-CM

## 2024-04-30 DIAGNOSIS — G89.29 CHRONIC RIGHT SHOULDER PAIN: ICD-10-CM

## 2024-04-30 PROCEDURE — 97112 NEUROMUSCULAR REEDUCATION: CPT

## 2024-04-30 PROCEDURE — 97530 THERAPEUTIC ACTIVITIES: CPT

## 2024-04-30 PROCEDURE — 97110 THERAPEUTIC EXERCISES: CPT
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
## 2024-05-01 ENCOUNTER — PATIENT MESSAGE (OUTPATIENT)
Dept: REHABILITATION | Facility: HOSPITAL | Age: 39
End: 2024-05-01
Payer: COMMERCIAL

## 2024-05-03 ENCOUNTER — CLINICAL SUPPORT (OUTPATIENT)
Dept: REHABILITATION | Facility: HOSPITAL | Age: 39
End: 2024-05-03
Payer: COMMERCIAL

## 2024-05-03 DIAGNOSIS — M25.511 CHRONIC RIGHT SHOULDER PAIN: ICD-10-CM

## 2024-05-03 DIAGNOSIS — M25.611 DECREASED ROM OF RIGHT SHOULDER: Primary | ICD-10-CM

## 2024-05-03 DIAGNOSIS — G89.29 CHRONIC RIGHT SHOULDER PAIN: ICD-10-CM

## 2024-05-03 PROCEDURE — 97530 THERAPEUTIC ACTIVITIES: CPT

## 2024-05-03 PROCEDURE — 97110 THERAPEUTIC EXERCISES: CPT

## 2024-05-03 PROCEDURE — 97112 NEUROMUSCULAR REEDUCATION: CPT

## 2024-05-03 NOTE — PROGRESS NOTES
[facility]   Physical Therapy Treatment Note      Name: Paul Fitzgerald  Clinic Number: [id number]    Therapy Diagnosis:   No diagnosis found.    Physician: Angie Parkinson*    Visit Date: 5/3/2024    Physician Orders: PT Eval and Treat   Medical Diagnosis from Referral: Traumatic complete tear of right rotator cuff, initial encounter     Biceps tendinopathy of right upper extremity  Evaluation Date: 3/14/2024  Authorization Period Expiration: 03/05/202412/31/2024  Plan of Care Expiration: 8/14/24  Progress Note Due: 4/14/24  Visit # / Visits authorized: 12 / 20     FOTO: 6%  FOTO 2: 65%  FOTO 3: 65%  DC FOTO @: 61%        Precautions: Standard      Time In: 1001 am    Time Out: 1050 am   Total Appointment Time (timed & untimed codes): 49 minutes     DATE OF PROCEDURE: Tues 3/12/2024   Right  1. Shoulder open subpectoral biceps tenodesis   2. Shoulder arthroscopic extensive debridement   3. Shoulder arthroscopic subacromial decompression     Physical Therapy: Follow the biceps tenodesis repair rehab protocol. PROM may start now.  Active range of motion of the elbow and shoulder may start in 1 week. No biceps resistive activity x 8 weeks. Sling immobilization for 3-4 weeks.        Subjective     Pt reports: she was sore after last session, but is not in any pain. Reports she's been taking it easy since the last visit, but has done her home exercises.     She was compliant with home exercise program.  Response to previous treatment: good, improved symptoms   Functional change: improved PROM, full elbow extension     Pain: 0/10  Location: RIGHT shoulder    Objective      Objective Measures updated at progress report unless specified.     Active Range of Motion:   Shoulder Right   Flexion 165   Abduction 160   ER at 20 Abd 45   ER at 90 Abd  40   IR at 45 Abd 20   IR at 90 Abd  10        Treatment     Paul received the treatments listed below:      Paul received therapeutic  "exercises to develop strength, endurance, ROM, flexibility, posture and core stabilization for 13 minutes including:   Side lying              ER 2# 3 x 10   Abduction 2# 3 x 10    Horizontal abduction 2# 3 x 10    Paul participated in dynamic functional therapeutic activities to improve functional performance for 23 minutes, including:  For overhead reaching:   Standing flexion with dowel #1 hands together/apart 2 x 10  Flexion walkout stretch 3" 2 x 10  Side lying abduction 3 x 10   Landmine 3# 3 x 10   Arm bike forward/backward 3' ea    Paul participated in neuromuscular re-education activities to improve: Balance, Coordination, Kinesthetic, Sense, Proprioception and Posture for 13 minutes. The following activities were included:  Prone    ER 90/90 3 x 10  T #1  3 x 10  Row 2# 3 x 10  IR/ER GTB 3 x 10    Not today:  ER 90/90 3 x 10   Supine flexion 1# 3 x 10   Prone IR passive/active stretch   Seated ER with bolster    90 deg flex 3 x 10  90/90 3 x 10          Patient Education and Home Exercises       Education provided:   - HEP  - post op precautions  - sling wear  - infection prevention  - NWB RUE    Written Home Exercises Provided: Patient instructed to cont prior HEP. Exercises were reviewed and Paul was able to demonstrate them prior to the end of the session.  Paul demonstrated good  understanding of the education provided. See EMR under Patient Instructions for exercises provided during therapy sessions    Assessment     Patient is 7 weeks and two days post-op today. Patient is able to tolerate treatment session. She presents with increased ROM, normalizing mechanics with overhead AROM. Continue to progress strength dropping to once a week next week. Goals for the future were addressed with patient. She is complaint with HEP.     Paul Is progressing well towards her goals.   Pt prognosis is Guarded.     Pt will continue to benefit from skilled outpatient physical therapy to address the deficits " listed in the problem list box on initial evaluation, provide pt/family education and to maximize pt's level of independence in the home and community environment.     Pt's spiritual, cultural and educational needs considered and pt agreeable to plan of care and goals.     Anticipated barriers to physical therapy: none    Goals:   Short Term Goals: 6 weeks  - MET   1. Indep with HEP  2. PROM R shoulder to WNL   3. AROM start at 6 weeks  4. Decrease pain </=5/10      Long Term Goals: 12-30 weeks   1. Indep with HEP   2. AROM R shoulder to WNL  3. Improve strength of scapula/cuff muscles to 4/5 pain-free  4. Normal mechanics of flexion/abd without scapular elevation   5. Pain-free AROM of R shoulder =/<1/10     Plan     Continue with PT CHEMA Rice

## 2024-05-08 ENCOUNTER — CLINICAL SUPPORT (OUTPATIENT)
Dept: REHABILITATION | Facility: HOSPITAL | Age: 39
End: 2024-05-08
Payer: COMMERCIAL

## 2024-05-08 DIAGNOSIS — M25.511 CHRONIC RIGHT SHOULDER PAIN: ICD-10-CM

## 2024-05-08 DIAGNOSIS — G89.29 CHRONIC RIGHT SHOULDER PAIN: ICD-10-CM

## 2024-05-08 DIAGNOSIS — M25.611 DECREASED ROM OF RIGHT SHOULDER: Primary | ICD-10-CM

## 2024-05-08 PROCEDURE — 97530 THERAPEUTIC ACTIVITIES: CPT

## 2024-05-08 PROCEDURE — 97112 NEUROMUSCULAR REEDUCATION: CPT

## 2024-05-08 PROCEDURE — 97110 THERAPEUTIC EXERCISES: CPT

## 2024-05-08 NOTE — PROGRESS NOTES
[facility]   Physical Therapy Treatment Note      Name: Paul Fitzgerald  Clinic Number: [id number]    Therapy Diagnosis:   Encounter Diagnoses   Name Primary?    Decreased ROM of right shoulder Yes    Chronic right shoulder pain      Physician: Angie Parkinson*    Visit Date: 5/8/2024    Physician Orders: PT Eval and Treat   Medical Diagnosis from Referral: Traumatic complete tear of right rotator cuff, initial encounter     Biceps tendinopathy of right upper extremity  Evaluation Date: 3/14/2024  Authorization Period Expiration: 03/05/202412/31/2024  Plan of Care Expiration: 8/14/24  Progress Note Due: 4/14/24  Visit # / Visits authorized: 13 / 20     FOTO: 6%  FOTO 2: 65%  FOTO 3: 65%  FOTO 4:   DC FOTO @: 61%        Precautions: Standard      Time In:   1636  Time Out:  1730  Total Appointment Time (timed & untimed codes): 54 minutes     DATE OF PROCEDURE: Tues 3/12/2024   Right  1. Shoulder open subpectoral biceps tenodesis   2. Shoulder arthroscopic extensive debridement   3. Shoulder arthroscopic subacromial decompression     Physical Therapy: Follow the biceps tenodesis repair rehab protocol. PROM may start now.  Active range of motion of the elbow and shoulder may start in 1 week. No biceps resistive activity x 8 weeks. Sling immobilization for 3-4 weeks.        Subjective     Pt reports: she has been in no pain but has not done her exercises and feels stiff. Needs new HEP so that she can do her exercises.   She was NOT compliant with home exercise program.  Response to previous treatment: good, improved symptoms   Functional change: improved PROM, full elbow extension     Pain: 0/10  Location: RIGHT shoulder    Objective      Objective Measures updated at progress report unless specified.     Active Range of Motion:   Shoulder Right   Flexion 165   Abduction 160   ER at 20 Abd 45   ER at 90 Abd  40   IR at 45 Abd 20   IR at 90 Abd  10      Treatment     Paul received  the treatments listed below:      Paul received therapeutic exercises to develop strength, endurance, ROM, flexibility, posture and core stabilization for 16 minutes including:   Side lying  ER 2# 3 x 10  Prone IR passive/active stretch   Seated ER with bolster 2#   90 deg flex 3 x 10   90/90 3 x 10  IR behind back stretch w/ strap 3 x 1'     Paul participated in dynamic functional therapeutic activities to improve functional performance for 23 minutes, including:  For overhead reaching:   Supine flexion 1# 3 x 10  Flexion <> abduction in mirror 2 x 10   Landmine 4# 3 x 8     Paul participated in neuromuscular re-education activities to improve: Balance, Coordination, Kinesthetic, Sense, Proprioception and Posture for 15 minutes. The following activities were included:  Prone    ER 90/90 3 x 10  T  2# 3 x 10  Row 2# 3 x 10  IR/ER GTB 3 x 10      Patient Education and Home Exercises       Education provided:   - HEP  - post op precautions  - sling wear  - infection prevention  - NWB RUE    Written Home Exercises Provided: Patient instructed to cont prior HEP. Exercises were reviewed and Paul was able to demonstrate them prior to the end of the session.  Paul demonstrated good  understanding of the education provided. See EMR under Patient Instructions for exercises provided during therapy sessions    Assessment     Patient is 7 weeks 5 days post-op today. She has been non-compliant with her HEP and presents with 10 vertebrae difference for behind back IR AROM. Updated her HEP and issued so she can do it at home next week. Anticipated discharge by the end of May.     Paul Is progressing well towards her goals.   Pt prognosis is Guarded.     Pt will continue to benefit from skilled outpatient physical therapy to address the deficits listed in the problem list box on initial evaluation, provide pt/family education and to maximize pt's level of independence in the home and community environment.     Pt's  spiritual, cultural and educational needs considered and pt agreeable to plan of care and goals.     Anticipated barriers to physical therapy: none    Goals:   Short Term Goals: 6 weeks  - MET   1. Indep with HEP  2. PROM R shoulder to WNL   3. AROM start at 6 weeks  4. Decrease pain </=5/10      Long Term Goals: 12-30 weeks   1. Indep with HEP   2. AROM R shoulder to WNL  3. Improve strength of scapula/cuff muscles to 4/5 pain-free  4. Normal mechanics of flexion/abd without scapular elevation   5. Pain-free AROM of R shoulder =/<1/10     Plan     Continue with PT SELENA Rivero, PT

## 2024-05-20 ENCOUNTER — CLINICAL SUPPORT (OUTPATIENT)
Dept: REHABILITATION | Facility: HOSPITAL | Age: 39
End: 2024-05-20
Payer: COMMERCIAL

## 2024-05-20 DIAGNOSIS — M25.611 DECREASED ROM OF RIGHT SHOULDER: Primary | ICD-10-CM

## 2024-05-20 DIAGNOSIS — M25.511 CHRONIC RIGHT SHOULDER PAIN: ICD-10-CM

## 2024-05-20 DIAGNOSIS — G89.29 CHRONIC RIGHT SHOULDER PAIN: ICD-10-CM

## 2024-05-20 PROCEDURE — 97112 NEUROMUSCULAR REEDUCATION: CPT

## 2024-05-20 PROCEDURE — 97110 THERAPEUTIC EXERCISES: CPT

## 2024-05-20 PROCEDURE — 97530 THERAPEUTIC ACTIVITIES: CPT

## 2024-05-20 NOTE — PROGRESS NOTES
[facility]   Physical Therapy Treatment Note      Name: Paul Fitzgerald  Clinic Number: [id number]    Therapy Diagnosis:   No diagnosis found.    Physician: Angie Parkinson*    Visit Date: 5/20/2024    Physician Orders: PT Eval and Treat   Medical Diagnosis from Referral: Traumatic complete tear of right rotator cuff, initial encounter     Biceps tendinopathy of right upper extremity  Evaluation Date: 3/14/2024  Authorization Period Expiration: 03/05/202412/31/2024  Plan of Care Expiration: 8/14/24  Progress Note Due: 4/14/24  Visit # / Visits authorized: 14 / 20     FOTO: 6%  FOTO 2: 65%  FOTO 3: 65%  FOTO 4:   DC FOTO @: 61%        Precautions: Standard      Time In:   0900  Time Out:  0958  Total Appointment Time (timed & untimed codes): 58 minutes     DATE OF PROCEDURE: Tues 3/12/2024   Right  1. Shoulder open subpectoral biceps tenodesis   2. Shoulder arthroscopic extensive debridement   3. Shoulder arthroscopic subacromial decompression     Physical Therapy: Follow the biceps tenodesis repair rehab protocol. PROM may start now.  Active range of motion of the elbow and shoulder may start in 1 week. No biceps resistive activity x 8 weeks. Sling immobilization for 3-4 weeks.        Subjective     Pt reports: patient is doing fine with no pain, feels like she is getting stronger.    She was  compliant with home exercise program.  Response to previous treatment: good, improved symptoms   Functional change: improved PROM, full elbow extension     Pain: 0/10  Location: RIGHT shoulder    Objective      Objective Measures updated at progress report unless specified.     Active Range of Motion:   Shoulder Right   Flexion 165   Abduction 160   ER at 20 Abd 45   ER at 90 Abd  40   IR at 45 Abd 20   IR at 90 Abd  10      Treatment     Paul received the treatments listed below:      Paul received therapeutic exercises to develop strength, endurance, ROM, flexibility, posture and  "core stabilization for 10 minutes including:   IR behind back stretch w/ strap 5 x 30"  Ball taps against wall 90/90 abd 3 x 8  Bicep 3 ways 3 x 10        Paul participated in dynamic functional therapeutic activities to improve functional performance for 28 minutes, including:  Landmine 5# 3 x 10  Std Flexion #3 3 x 10  Scaption lifts 3 x 8  Std Flx YTB 3 x 10  Std D2 ext 3 x 10    Paul participated in neuromuscular re-education activities to improve: Balance, Coordination, Kinesthetic, Sense, Proprioception and Posture for 20 minutes. The following activities were included:  IR/ER GTB 3 x 10  IR/ER 90/90 Flx 3 x 10  IR/ER 90/90 Abd 3 x 10  Row at cables 3# 3 x 10        Patient Education and Home Exercises       Education provided:   - HEP  - post op precautions  - sling wear  - infection prevention  - NWB RUE    Written Home Exercises Provided: Patient instructed to cont prior HEP. Exercises were reviewed and Paul was able to demonstrate them prior to the end of the session.  Paul demonstrated good  understanding of the education provided. See EMR under Patient Instructions for exercises provided during therapy sessions    Assessment     Patient is 9 weeks 3 days post-op today.   She has been complaint with her HEP and presents with increased ROM. Patient was progressed and challenged by more standing functional activities. She had notable fatigue by the end of the session but tolerated progressions well.     Paul Is progressing well towards her goals.   Pt prognosis is Guarded.     Pt will continue to benefit from skilled outpatient physical therapy to address the deficits listed in the problem list box on initial evaluation, provide pt/family education and to maximize pt's level of independence in the home and community environment.     Pt's spiritual, cultural and educational needs considered and pt agreeable to plan of care and goals.     Anticipated barriers to physical therapy: none    Goals: "   Short Term Goals: 6 weeks  - MET   1. Indep with HEP  2. PROM R shoulder to WNL   3. AROM start at 6 weeks  4. Decrease pain </=5/10      Long Term Goals: 12-30 weeks   1. Indep with HEP   2. AROM R shoulder to WNL  3. Improve strength of scapula/cuff muscles to 4/5 pain-free  4. Normal mechanics of flexion/abd without scapular elevation   5. Pain-free AROM of R shoulder =/<1/10     Plan     Continue with PT POC    Braydon Marquez, SPTA     Janet Rivero, PT, DPT  I certify that I was present in the room directing the student in service delivery and guiding them using my skilled judgment.  As the co-signing therapist I have reviewed the student's documentation and am responsible for the treatment, assessment, and plan.

## 2024-05-29 ENCOUNTER — CLINICAL SUPPORT (OUTPATIENT)
Dept: REHABILITATION | Facility: HOSPITAL | Age: 39
End: 2024-05-29
Payer: COMMERCIAL

## 2024-05-29 DIAGNOSIS — M25.511 CHRONIC RIGHT SHOULDER PAIN: ICD-10-CM

## 2024-05-29 DIAGNOSIS — G89.29 CHRONIC RIGHT SHOULDER PAIN: ICD-10-CM

## 2024-05-29 DIAGNOSIS — M25.611 DECREASED ROM OF RIGHT SHOULDER: Primary | ICD-10-CM

## 2024-05-29 PROCEDURE — 97112 NEUROMUSCULAR REEDUCATION: CPT

## 2024-05-29 PROCEDURE — 97530 THERAPEUTIC ACTIVITIES: CPT

## 2024-05-29 PROCEDURE — 97110 THERAPEUTIC EXERCISES: CPT

## 2024-05-29 NOTE — PROGRESS NOTES
[facility]   Physical Therapy Treatment Note      Name: Paul Fitzgerald  Clinic Number: [id number]    Therapy Diagnosis:   Encounter Diagnoses   Name Primary?    Decreased ROM of right shoulder Yes    Chronic right shoulder pain      Physician: Angie Parkinson*    Visit Date: 5/29/2024    Physician Orders: PT Eval and Treat   Medical Diagnosis from Referral: Traumatic complete tear of right rotator cuff, initial encounter     Biceps tendinopathy of right upper extremity  Evaluation Date: 3/14/2024  Authorization Period Expiration: 03/05/202412/31/2024  Plan of Care Expiration: 8/14/24  Progress Note Due: 4/14/24  Visit # / Visits authorized: 15 / 20     FOTO: 6%  FOTO 2: 65%  FOTO 3: 65%  FOTO 4:   DC FOTO @: 61%        Precautions: Standard      Time In:   1001  Time Out:  1056  Total Appointment Time (timed & untimed codes): 55 minutes     DATE OF PROCEDURE: Tues 3/12/2024   Right  1. Shoulder open subpectoral biceps tenodesis   2. Shoulder arthroscopic extensive debridement   3. Shoulder arthroscopic subacromial decompression     Physical Therapy: Follow the biceps tenodesis repair rehab protocol. PROM may start now.  Active range of motion of the elbow and shoulder may start in 1 week. No biceps resistive activity x 8 weeks. Sling immobilization for 3-4 weeks.        Subjective     Pt reports: patient was sore after last session. She is ready to be discharged and do what she needs to do. She is complaint with her HEP a few times a week.     She was  compliant with home exercise program.  Response to previous treatment: good, improved symptoms   Functional change: improved PROM, full elbow extension     Pain: 0/10  Location: RIGHT shoulder    Objective      Objective Measures updated at progress report unless specified.     Active Range of Motion:   Shoulder Right   Flexion 165   Abduction 160   ER at 20 Abd 45   ER at 90 Abd  40   IR at 45 Abd 20   IR at 90 Abd  10     "  Treatment     Paul received the treatments listed below:      Paul received therapeutic exercises to develop strength, endurance, ROM, flexibility, posture and core stabilization for 10 minutes including:   IR behind back stretch w/ strap 5 x 30"  Bicep 3 ways #3 3 x 10  Seated bolster ER 90deg flx 3 x10  Seated bolster ER 90/90 abd 3 x 10    NP:  Ball taps against wall 90/90 abd 3 x 8    Paul participated in dynamic functional therapeutic activities to improve functional performance for 25 minutes, including:  Landmine 5# 3 x 10  Std Flexion #3 3 x 10  Scaption lifts 3 x 8  Std Flx YTB 3 x 10  Std D2 ext 3 x 10  Behind the back dowel lifts #2 IR 3 x 10    Paul participated in neuromuscular re-education activities to improve: Balance, Coordination, Kinesthetic, Sense, Proprioception and Posture for 20 minutes. The following activities were included:  IR/ER GTB 3 x 10  IR/ER 90/90 Flx 3 x 10  Row OTB 3 x 10    NP:  IR/ER 90/90 Abd 3 x 10    Patient Education and Home Exercises       Education provided:   - HEP  - post op precautions  - sling wear  - infection prevention  - NWB RUE    Written Home Exercises Provided: Patient instructed to cont prior HEP. Exercises were reviewed and Paul was able to demonstrate them prior to the end of the session.  Paul demonstrated good  understanding of the education provided. See EMR under Patient Instructions for exercises provided during therapy sessions    Assessment     Patient is 10 weeks 5 days post-op today. Patient is tolerating treatment sessions well and handling progressions as planned. Patient is coming to the end of PT, but still needs work in periscapular strengthening.     Paul Is progressing well towards her goals.   Pt prognosis is Guarded.     Pt will continue to benefit from skilled outpatient physical therapy to address the deficits listed in the problem list box on initial evaluation, provide pt/family education and to maximize pt's level of " independence in the home and community environment.     Pt's spiritual, cultural and educational needs considered and pt agreeable to plan of care and goals.     Anticipated barriers to physical therapy: none    Goals:   Short Term Goals: 6 weeks  - MET   1. Indep with HEP  2. PROM R shoulder to WNL   3. AROM start at 6 weeks  4. Decrease pain </=5/10      Long Term Goals: 12-30 weeks   1. Indep with HEP   2. AROM R shoulder to WNL  3. Improve strength of scapula/cuff muscles to 4/5 pain-free  4. Normal mechanics of flexion/abd without scapular elevation   5. Pain-free AROM of R shoulder =/<1/10     Plan     Continue with PT POC    Janet Rivero, PT     Janet Rivero, PT, DPT  I certify that I was present in the room directing the student in service delivery and guiding them using my skilled judgment.  As the co-signing therapist I have reviewed the student's documentation and am responsible for the treatment, assessment, and plan.

## 2024-06-11 ENCOUNTER — OFFICE VISIT (OUTPATIENT)
Dept: SPORTS MEDICINE | Facility: CLINIC | Age: 39
End: 2024-06-11
Payer: COMMERCIAL

## 2024-06-11 VITALS
HEART RATE: 74 BPM | DIASTOLIC BLOOD PRESSURE: 56 MMHG | HEIGHT: 58 IN | SYSTOLIC BLOOD PRESSURE: 93 MMHG | BODY MASS INDEX: 36.56 KG/M2 | WEIGHT: 174.19 LBS

## 2024-06-11 DIAGNOSIS — Z98.890 S/P SHOULDER SURGERY: Primary | ICD-10-CM

## 2024-06-11 PROCEDURE — 3078F DIAST BP <80 MM HG: CPT | Mod: CPTII,S$GLB,, | Performed by: PHYSICIAN ASSISTANT

## 2024-06-11 PROCEDURE — 1159F MED LIST DOCD IN RCRD: CPT | Mod: CPTII,S$GLB,, | Performed by: PHYSICIAN ASSISTANT

## 2024-06-11 PROCEDURE — 99999 PR PBB SHADOW E&M-EST. PATIENT-LVL III: CPT | Mod: PBBFAC,,, | Performed by: PHYSICIAN ASSISTANT

## 2024-06-11 PROCEDURE — 99213 OFFICE O/P EST LOW 20 MIN: CPT | Mod: S$GLB,,, | Performed by: PHYSICIAN ASSISTANT

## 2024-06-11 PROCEDURE — 1160F RVW MEDS BY RX/DR IN RCRD: CPT | Mod: CPTII,S$GLB,, | Performed by: PHYSICIAN ASSISTANT

## 2024-06-11 PROCEDURE — 3008F BODY MASS INDEX DOCD: CPT | Mod: CPTII,S$GLB,, | Performed by: PHYSICIAN ASSISTANT

## 2024-06-11 PROCEDURE — 3074F SYST BP LT 130 MM HG: CPT | Mod: CPTII,S$GLB,, | Performed by: PHYSICIAN ASSISTANT

## 2024-06-11 NOTE — PROGRESS NOTES
S:Paul Fitzgerald presents for post-operative evaluation.     DATE OF PROCEDURE: 3/12/2024   OPERATION:   Right  1. Shoulder open subpectoral biceps tenodesis (CPT 30317)  2. Shoulder arthroscopic extensive debridement (CPT 95878)    3. Shoulder arthroscopic subacromial decompression    Paul Fitzgerald reports to be doing well 12 weeks s/p the above mentioned procedure. Going to PT at the [place] location with Janet. Doing well. No pain. Pleased with the progress.    O:  Exam of the right shoulder demonstrates well-healed arthroscopic portals.  No biceps deformity.  Passive forward elevation to 175° with relative ease.  Soft endpoint.  Active forward elevation to 170 degrees.  No scapular winging. Active external rotation with arm at 90 to 80°.  Passive external rotation with arm at 90 to 90°.    A/P: She is doing great. PT down to once weekly. No pain. Likely discharge and progression to HEP with PT soon.  Biceps resistance ok. All questions answered.  We will see her back in 3 months for routine recheck.

## 2024-06-20 ENCOUNTER — TELEPHONE (OUTPATIENT)
Dept: SMOKING CESSATION | Facility: CLINIC | Age: 39
End: 2024-06-20
Payer: COMMERCIAL

## 2024-06-28 ENCOUNTER — OFFICE VISIT (OUTPATIENT)
Dept: OBSTETRICS AND GYNECOLOGY | Facility: CLINIC | Age: 39
End: 2024-06-28
Payer: COMMERCIAL

## 2024-06-28 ENCOUNTER — LAB VISIT (OUTPATIENT)
Dept: LAB | Facility: OTHER | Age: 39
End: 2024-06-28
Attending: FAMILY MEDICINE
Payer: COMMERCIAL

## 2024-06-28 VITALS
DIASTOLIC BLOOD PRESSURE: 68 MMHG | HEIGHT: 58 IN | WEIGHT: 174.63 LBS | BODY MASS INDEX: 36.66 KG/M2 | SYSTOLIC BLOOD PRESSURE: 108 MMHG

## 2024-06-28 DIAGNOSIS — Z20.2 POTENTIAL EXPOSURE TO STD: Primary | ICD-10-CM

## 2024-06-28 DIAGNOSIS — R30.0 DYSURIA: ICD-10-CM

## 2024-06-28 DIAGNOSIS — N76.0 ACUTE VAGINITIS: ICD-10-CM

## 2024-06-28 DIAGNOSIS — Z20.2 POTENTIAL EXPOSURE TO STD: ICD-10-CM

## 2024-06-28 LAB
HBV SURFACE AG SERPL QL IA: NORMAL
HCV AB SERPL QL IA: NEGATIVE
HIV 1+2 AB+HIV1 P24 AG SERPL QL IA: NEGATIVE
TREPONEMA PALLIDUM IGG+IGM AB [PRESENCE] IN SERUM OR PLASMA BY IMMUNOASSAY: NONREACTIVE

## 2024-06-28 PROCEDURE — 86593 SYPHILIS TEST NON-TREP QUANT: CPT | Performed by: FAMILY MEDICINE

## 2024-06-28 PROCEDURE — 36415 COLL VENOUS BLD VENIPUNCTURE: CPT | Performed by: FAMILY MEDICINE

## 2024-06-28 PROCEDURE — 87340 HEPATITIS B SURFACE AG IA: CPT | Performed by: FAMILY MEDICINE

## 2024-06-28 PROCEDURE — 87086 URINE CULTURE/COLONY COUNT: CPT | Performed by: FAMILY MEDICINE

## 2024-06-28 PROCEDURE — 86803 HEPATITIS C AB TEST: CPT | Performed by: FAMILY MEDICINE

## 2024-06-28 PROCEDURE — 87491 CHLMYD TRACH DNA AMP PROBE: CPT | Performed by: FAMILY MEDICINE

## 2024-06-28 PROCEDURE — 87389 HIV-1 AG W/HIV-1&-2 AB AG IA: CPT | Performed by: FAMILY MEDICINE

## 2024-06-28 PROCEDURE — 99999 PR PBB SHADOW E&M-EST. PATIENT-LVL III: CPT | Mod: PBBFAC,,, | Performed by: FAMILY MEDICINE

## 2024-06-28 NOTE — PROGRESS NOTES
CC: Vaginitis  HPI: Patient presents for evaluation of an abnormal vaginal discharge. Symptoms have been present for 1 week. Vaginal symptoms: burning and mild dysuria- improving . Contraception: paragaurd. She denies discharge, odor, and urinary symptoms of hematuria and fever . Sexually transmitted infection risk: very low risk of STD exposure. SA male partner, does use condoms. Treated for BV about a month ago with flagyl. This is the extent of the patient's complaints at this time.     ROS:  GENERAL: No fever, chills, fatigability or weight loss.  VULVAR: No pain, no lesions and no itching.  VAGINAL: No relaxation, no itching, no discharge, no abnormal bleeding and no lesions. +burning  URINARY: No incontinence, no nocturia, no frequency and + dysuria.     PHYSICAL EXAM:  Physical Exam:   Constitutional: She appears well-developed and well-nourished. She does not appear ill. No distress.               Genitourinary:    Uterus, right adnexa and left adnexa normal.      Pelvic exam was performed with patient in the lithotomy position.   The external female genitalia was normal.     Labial bartholins normal.There is no rash, tenderness or lesion on the right labia. There is no rash, tenderness or lesion on the left labia. Cervix is normal. Right adnexum displays no mass, no tenderness and no fullness. Left adnexum displays no mass, no tenderness and no fullness. There is bleeding (cycle starting) in the vagina. No vaginal discharge, tenderness, rectocele, cystocele or prolapse of vaginal walls in the vagina.    No foreign body in the vagina.   Cervix exhibits no motion tenderness, no lesion, no discharge, no friability and no polyp. Normal urethral meatus.Urethra findings: no urethral massIUD strings visualized.              Neurological: She is alert. GCS eye subscore is 4. GCS verbal subscore is 5. GCS motor subscore is 6.           History reviewed. No pertinent past medical history.    Past Surgical History:    Procedure Laterality Date    ARTHROSCOPIC DEBRIDEMENT OF SHOULDER Right 3/12/2024    Procedure: DEBRIDEMENT, SHOULDER, ARTHROSCOPIC;  Surgeon: SILVIA Noyola MD;  Location: [facility] OR;  Service: Orthopedics;  Laterality: Right;    ARTHROSCOPY OF SHOULDER WITH DECOMPRESSION OF SUBACROMIAL SPACE  3/12/2024    Procedure: ARTHROSCOPY, SHOULDER, WITH SUBACROMIAL SPACE DECOMPRESSION;  Surgeon: SILVIA Noyola MD;  Location: [facility] OR;  Service: Orthopedics;;    COLPOSCOPY  2019    FIXATION OF TENDON Right 3/12/2024    Procedure: FIXATION, TENDON, BICEPS TENODESIS;  Surgeon: SILVIA Noyola MD;  Location: [facility] OR;  Service: Orthopedics;  Laterality: Right;    TONGUE SURGERY      extra skin removed as a small child       Family History   Problem Relation Name Age of Onset    No Known Problems Father      Cancer Mother      Breast cancer Neg Hx      Colon cancer Neg Hx      Ovarian cancer Neg Hx         Social History     Socioeconomic History    Marital status: Single   Tobacco Use    Smoking status: Every Day     Current packs/day: 1.00     Types: Cigarettes     Passive exposure: Current    Smokeless tobacco: Never   Substance and Sexual Activity    Alcohol use: Yes     Comment: soc    Drug use: Yes     Types: Marijuana    Sexual activity: Yes     Partners: Male     Birth control/protection: Condom, I.U.D.     Social Determinants of Health     Financial Resource Strain: Low Risk  (2/27/2024)    Overall Financial Resource Strain (CARDIA)     Difficulty of Paying Living Expenses: Not hard at all   Food Insecurity: No Food Insecurity (2/27/2024)    Hunger Vital Sign     Worried About Running Out of Food in the Last Year: Never true     Ran Out of Food in the Last Year: Never true   Transportation Needs: No Transportation Needs (2/27/2024)    PRAPARE - Transportation     Lack of Transportation (Medical): No     Lack of Transportation (Non-Medical): No   Physical Activity: Insufficiently Active (2/27/2024)    Exercise  Vital Sign     Days of Exercise per Week: 4 days     Minutes of Exercise per Session: 20 min   Stress: Stress Concern Present (2024)    [employer] [place] [employer] - Occupational Stress Questionnaire     Feeling of Stress : To some extent   Housing Stability: Low Risk  (2024)    Housing Stability Vital Sign     Unable to Pay for Housing in the Last Year: No     Number of Places Lived in the Last Year: 1     Unstable Housing in the Last Year: No       Current Outpatient Medications   Medication Sig Dispense Refill    aspirin (ECOTRIN) 81 MG EC tablet Take 1 tablet (81 mg total) by mouth 2 (two) times a day. for 14 days 28 tablet 0    benzonatate (TESSALON PERLES) 100 MG capsule Take 1 capsule (100 mg total) by mouth every 6 (six) hours as needed for Cough. (Patient not taking: Reported on 2024) 30 capsule 1    copper intrauterine device (PARAGARD T 380A) 380 square mm IUD 1 each by Intrauterine route. (Patient not taking: Reported on 2024)      desonide (DESOWEN) 0.05 % cream Apply topically 2 (two) times daily. (Patient not taking: Reported on 2024)      dextromethorphan-guaifenesin  mg (MUCINEX DM)  mg per 12 hr tablet Take 1 tablet by mouth every 12 (twelve) hours. (Patient not taking: Reported on 2024)      fexofenadine (ALLEGRA) 60 MG tablet Take 60 mg by mouth once daily. (Patient not taking: Reported on 2024)      fluticasone propionate (FLONASE) 50 mcg/actuation nasal spray 1 spray (50 mcg total) by Each Nostril route once daily. (Patient not taking: Reported on 2024) 9.9 mL 0    ondansetron (ZOFRAN-ODT) 4 MG TbDL Dissolve 1 tablet (4 mg total) by mouth every 8 (eight) hours as needed (nausea). (Patient not taking: Reported on 2024) 30 tablet 0     No current facility-administered medications for this visit.       Review of patient's allergies indicates:   Allergen Reactions    Penicillins Hives          OB History    Para Term   AB Living   0 0 0 0 0 0   SAB IAB Ectopic Multiple Live Births   0 0 0 0 0      UA with blood    Assessment/Plan:    Potential exposure to STD  -     C. trachomatis/N. gonorrhoeae by AMP DNA [facility]; Cervicovaginal  -     HIV 1/2 Ag/Ab (4th Gen); Future; Expected date: 2024  -     Treponema Pallidium Antibodies IgG, IgM; Future; Expected date: 2024  -     Hepatitis C Antibody; Future; Expected date: 2024  -     Hepatitis B Surface Antigen; Future; Expected date: 2024    Dysuria  -     POCT Urinalysis, Dipstick, Automated, W/O Scope  -     Urine culture    Acute vaginitis  -     Vaginosis Screen by DNA Probe    Discussed unsure of insurance coverage with vaginosis swab. Pt would still like to do.         Patient was counseled today on vaginitis prevention including :  a. avoiding feminine products such as deoderant soaps, body wash, bubble bath, douches, scented toilet paper, deoderant tampons or pads, feminine wipes, chronic pad use, etc.  b. avoiding other vulvovaginal irritants such as long hot baths, humidity, tight, synthetic clothing, chlorine and sitting around in wet bathing suits  c. wearing cotton underwear, avoiding thong underwear and no underwear to bed  d. taking showers instead of baths and use a hair dryer on cool setting afterwards to dry  e. wearing cotton to exercise and shower immediately after exercise and change clothes  f. using polyurethane condoms without spermicide if sexually active and symptoms are triggered by intercourse     FOLLOW UP: PRN/lack of improvement.

## 2024-06-29 LAB
BACTERIA UR CULT: NORMAL
BACTERIA UR CULT: NORMAL
BACTERIAL VAGINOSIS DNA: POSITIVE
C TRACH DNA SPEC QL NAA+PROBE: NOT DETECTED
CANDIDA GLABRATA DNA: NEGATIVE
CANDIDA KRUSEI DNA: NEGATIVE
CANDIDA RRNA VAG QL PROBE: NEGATIVE
N GONORRHOEA DNA SPEC QL NAA+PROBE: NOT DETECTED
T VAGINALIS RRNA GENITAL QL PROBE: NEGATIVE

## 2024-07-01 ENCOUNTER — PATIENT MESSAGE (OUTPATIENT)
Dept: OBSTETRICS AND GYNECOLOGY | Facility: CLINIC | Age: 39
End: 2024-07-01
Payer: COMMERCIAL

## 2024-07-01 DIAGNOSIS — B96.89 BACTERIAL VAGINOSIS: Primary | ICD-10-CM

## 2024-07-01 DIAGNOSIS — N76.0 BACTERIAL VAGINOSIS: Primary | ICD-10-CM

## 2024-07-01 RX ORDER — METRONIDAZOLE 500 MG/1
500 TABLET ORAL EVERY 12 HOURS
Qty: 14 TABLET | Refills: 0 | Status: SHIPPED | OUTPATIENT
Start: 2024-07-01 | End: 2024-07-08

## 2024-09-09 ENCOUNTER — PATIENT MESSAGE (OUTPATIENT)
Dept: SPORTS MEDICINE | Facility: CLINIC | Age: 39
End: 2024-09-09
Payer: COMMERCIAL

## 2024-09-18 ENCOUNTER — OFFICE VISIT (OUTPATIENT)
Dept: SPORTS MEDICINE | Facility: CLINIC | Age: 39
End: 2024-09-18
Payer: COMMERCIAL

## 2024-09-18 VITALS
DIASTOLIC BLOOD PRESSURE: 62 MMHG | HEART RATE: 94 BPM | WEIGHT: 166.44 LBS | HEIGHT: 58 IN | BODY MASS INDEX: 34.94 KG/M2 | SYSTOLIC BLOOD PRESSURE: 93 MMHG

## 2024-09-18 DIAGNOSIS — Z98.890 S/P SHOULDER SURGERY: Primary | ICD-10-CM

## 2024-09-18 PROCEDURE — 3008F BODY MASS INDEX DOCD: CPT | Mod: CPTII,S$GLB,, | Performed by: PHYSICIAN ASSISTANT

## 2024-09-18 PROCEDURE — 99213 OFFICE O/P EST LOW 20 MIN: CPT | Mod: S$GLB,,, | Performed by: PHYSICIAN ASSISTANT

## 2024-09-18 PROCEDURE — 3074F SYST BP LT 130 MM HG: CPT | Mod: CPTII,S$GLB,, | Performed by: PHYSICIAN ASSISTANT

## 2024-09-18 PROCEDURE — 99999 PR PBB SHADOW E&M-EST. PATIENT-LVL III: CPT | Mod: PBBFAC,,, | Performed by: PHYSICIAN ASSISTANT

## 2024-09-18 PROCEDURE — 1159F MED LIST DOCD IN RCRD: CPT | Mod: CPTII,S$GLB,, | Performed by: PHYSICIAN ASSISTANT

## 2024-09-18 PROCEDURE — 3078F DIAST BP <80 MM HG: CPT | Mod: CPTII,S$GLB,, | Performed by: PHYSICIAN ASSISTANT

## 2024-09-18 PROCEDURE — 1160F RVW MEDS BY RX/DR IN RCRD: CPT | Mod: CPTII,S$GLB,, | Performed by: PHYSICIAN ASSISTANT

## 2024-09-18 NOTE — PROGRESS NOTES
S:Paul Fitzgerald presents for post-operative evaluation.     DATE OF PROCEDURE: 3/12/2024   OPERATION:   Right  1. Shoulder open subpectoral biceps tenodesis (CPT 97901)  2. Shoulder arthroscopic extensive debridement (CPT 85193)    3. Shoulder arthroscopic subacromial decompression    Paul Fitzgerald reports to be doing well 6 months s/p the above mentioned procedure. Discharged from PT at the [place] location with Janet and does HEP. Doing well. No pain. Happy with her results. She is able to do everything she wants to do.    O:  Exam of the right shoulder demonstrates well-healed arthroscopic portals.  No biceps deformity.  Passive forward elevation to 180° with relative ease.  Soft endpoint.  Active forward elevation to 180 degrees.  No scapular winging. Active external rotation with arm at 90 to 90°.  Passive external rotation with arm at 90 to 90°. ER to 60 with arm at side. IR to T10 with ease. Good cuff strength.    A/P: She is doing great. Happy with her progress. Discharged from formal PT and consistent with HEP. Back to baseline activity and doing great. Discussed long term restrictions in regard to heavy lifting.  All questions answered. She can follow up prn.

## 2024-11-22 ENCOUNTER — LAB VISIT (OUTPATIENT)
Dept: LAB | Facility: OTHER | Age: 39
End: 2024-11-22
Attending: FAMILY MEDICINE
Payer: COMMERCIAL

## 2024-11-22 ENCOUNTER — OFFICE VISIT (OUTPATIENT)
Dept: OBSTETRICS AND GYNECOLOGY | Facility: CLINIC | Age: 39
End: 2024-11-22
Payer: COMMERCIAL

## 2024-11-22 VITALS
BODY MASS INDEX: 35.48 KG/M2 | DIASTOLIC BLOOD PRESSURE: 72 MMHG | SYSTOLIC BLOOD PRESSURE: 103 MMHG | WEIGHT: 169.75 LBS

## 2024-11-22 DIAGNOSIS — Z20.2 POTENTIAL EXPOSURE TO STD: ICD-10-CM

## 2024-11-22 DIAGNOSIS — N76.0 ACUTE VAGINITIS: Primary | ICD-10-CM

## 2024-11-22 LAB
HCV AB SERPL QL IA: NEGATIVE
HIV 1+2 AB+HIV1 P24 AG SERPL QL IA: NEGATIVE
TREPONEMA PALLIDUM IGG+IGM AB [PRESENCE] IN SERUM OR PLASMA BY IMMUNOASSAY: NONREACTIVE

## 2024-11-22 PROCEDURE — 87591 N.GONORRHOEAE DNA AMP PROB: CPT | Performed by: FAMILY MEDICINE

## 2024-11-22 PROCEDURE — 36415 COLL VENOUS BLD VENIPUNCTURE: CPT | Performed by: FAMILY MEDICINE

## 2024-11-22 PROCEDURE — 87389 HIV-1 AG W/HIV-1&-2 AB AG IA: CPT | Performed by: FAMILY MEDICINE

## 2024-11-22 PROCEDURE — 0352U VAGINOSIS SCREEN BY DNA PROBE: CPT | Performed by: FAMILY MEDICINE

## 2024-11-22 PROCEDURE — 86803 HEPATITIS C AB TEST: CPT | Performed by: FAMILY MEDICINE

## 2024-11-22 PROCEDURE — 99999 PR PBB SHADOW E&M-EST. PATIENT-LVL III: CPT | Mod: PBBFAC,,, | Performed by: FAMILY MEDICINE

## 2024-11-22 PROCEDURE — 86593 SYPHILIS TEST NON-TREP QUANT: CPT | Performed by: FAMILY MEDICINE

## 2024-11-22 RX ORDER — METRONIDAZOLE 500 MG/1
500 TABLET ORAL EVERY 12 HOURS
Qty: 14 TABLET | Refills: 0 | Status: SHIPPED | OUTPATIENT
Start: 2024-11-22 | End: 2024-11-29

## 2024-11-22 NOTE — PROGRESS NOTES
CC: Vaginitis  HPI: Patient presents for evaluation of an abnormal vaginal discharge. Symptoms have been present for 3 weeks. Vaginal symptoms: discharge described as white and creamy and odor. Contraception: paragaurd. She denies local irritation, urinary symptoms of dysuria, hematuria, and fever, and vulvar itching. Sexually transmitted infection risk: very low risk of STD exposure. SA male partner, does use condoms. Sx improving some. This is the extent of the patient's complaints at this time.     ROS:  GENERAL: No fever, chills, fatigability or weight loss.  VULVAR: No pain, no lesions and no itching.  VAGINAL: No relaxation, no itching, + discharge, no abnormal bleeding and no lesions.+odor  URINARY: No incontinence, no nocturia, no frequency and no dysuria.     PHYSICAL EXAM:  Physical Exam:   Constitutional: She appears well-developed and well-nourished. She does not appear ill. No distress.               Genitourinary:    Uterus, right adnexa and left adnexa normal.      Pelvic exam was performed with patient in the lithotomy position.   The external female genitalia was normal.     Labial bartholins normal.There is no rash, tenderness or lesion on the right labia. There is no rash, tenderness or lesion on the left labia. Cervix is normal. Right adnexum displays no mass, no tenderness and no fullness. Left adnexum displays no mass, no tenderness and no fullness. There is vaginal discharge (scant thin) in the vagina. No tenderness, bleeding, rectocele, cystocele or prolapse of vaginal walls in the vagina.    No foreign body in the vagina.   Cervix exhibits no motion tenderness, no lesion, no discharge, no friability and no polyp. Normal urethral meatus.Urethra findings: no urethral massIUD strings visualized.              Neurological: She is alert. GCS eye subscore is 4. GCS verbal subscore is 5. GCS motor subscore is 6.           History reviewed. No pertinent past medical history.    Past Surgical  History:   Procedure Laterality Date    ARTHROSCOPIC DEBRIDEMENT OF SHOULDER Right 3/12/2024    Procedure: DEBRIDEMENT, SHOULDER, ARTHROSCOPIC;  Surgeon: SILVIA Noyola MD;  Location: [facility] OR;  Service: Orthopedics;  Laterality: Right;    ARTHROSCOPY OF SHOULDER WITH DECOMPRESSION OF SUBACROMIAL SPACE  3/12/2024    Procedure: ARTHROSCOPY, SHOULDER, WITH SUBACROMIAL SPACE DECOMPRESSION;  Surgeon: SILVIA Noyola MD;  Location: [facility] OR;  Service: Orthopedics;;    COLPOSCOPY  2019    FIXATION OF TENDON Right 3/12/2024    Procedure: FIXATION, TENDON, BICEPS TENODESIS;  Surgeon: SILVIA Noyola MD;  Location: [facility] OR;  Service: Orthopedics;  Laterality: Right;    TONGUE SURGERY      extra skin removed as a small child       Family History   Problem Relation Name Age of Onset    No Known Problems Father      Cancer Mother      Breast cancer Neg Hx      Colon cancer Neg Hx      Ovarian cancer Neg Hx         Social History     Socioeconomic History    Marital status: Single   Tobacco Use    Smoking status: Every Day     Current packs/day: 1.00     Types: Cigarettes     Passive exposure: Current    Smokeless tobacco: Never   Substance and Sexual Activity    Alcohol use: Yes     Comment: soc    Drug use: Yes     Types: Marijuana    Sexual activity: Yes     Partners: Male     Birth control/protection: Condom, I.U.D.     Social Drivers of Health     Financial Resource Strain: Low Risk  (2/27/2024)    Overall Financial Resource Strain (CARDIA)     Difficulty of Paying Living Expenses: Not hard at all   Food Insecurity: No Food Insecurity (2/27/2024)    Hunger Vital Sign     Worried About Running Out of Food in the Last Year: Never true     Ran Out of Food in the Last Year: Never true   Transportation Needs: No Transportation Needs (2/27/2024)    PRAPARE - Transportation     Lack of Transportation (Medical): No     Lack of Transportation (Non-Medical): No   Physical Activity: Insufficiently Active (2/27/2024)     Exercise Vital Sign     Days of Exercise per Week: 4 days     Minutes of Exercise per Session: 20 min   Stress: Stress Concern Present (2024)    [employer] [place] [employer] - Occupational Stress Questionnaire     Feeling of Stress : To some extent   Housing Stability: Low Risk  (2024)    Housing Stability Vital Sign     Unable to Pay for Housing in the Last Year: No     Number of Places Lived in the Last Year: 1     Unstable Housing in the Last Year: No       Current Outpatient Medications   Medication Sig Dispense Refill    copper intrauterine device (PARAGARD T 380A) 380 square mm IUD 1 each by Intrauterine route. (Patient not taking: Reported on 2024)      metroNIDAZOLE (FLAGYL) 500 MG tablet Take 1 tablet (500 mg total) by mouth every 12 (twelve) hours. Eat when taking, do not drink alcohol while taking and for 2 days after stopping for 7 days 14 tablet 0     No current facility-administered medications for this visit.       Review of patient's allergies indicates:   Allergen Reactions    Penicillins Hives          OB History    Para Term  AB Living   0 0 0 0 0 0   SAB IAB Ectopic Multiple Live Births   0 0 0 0 0          Assessment/Plan:    Acute vaginitis  -     Vaginosis Screen by DNA Probe  -     metroNIDAZOLE (FLAGYL) 500 MG tablet; Take 1 tablet (500 mg total) by mouth every 12 (twelve) hours. Eat when taking, do not drink alcohol while taking and for 2 days after stopping for 7 days  Dispense: 14 tablet; Refill: 0    Potential exposure to STD  -     Hepatitis C Antibody; Future; Expected date: 2024  -     HIV 1/2 Ag/Ab (4th Gen); Future; Expected date: 2024  -     Treponema Pallidium Antibodies IgG, IgM; Future; Expected date: 2024  -     C. trachomatis/N. gonorrhoeae by AMP DNA [facility]; Cervicovaginal      Discussed unsure of insurance coverage with vaginosis swab. Pt would still like to do.       Patient was counseled today on vaginitis  prevention including :  a. avoiding feminine products such as deoderant soaps, body wash, bubble bath, douches, scented toilet paper, deoderant tampons or pads, feminine wipes, chronic pad use, etc.  b. avoiding other vulvovaginal irritants such as long hot baths, humidity, tight, synthetic clothing, chlorine and sitting around in wet bathing suits  c. wearing cotton underwear, avoiding thong underwear and no underwear to bed  d. taking showers instead of baths and use a hair dryer on cool setting afterwards to dry  e. wearing cotton to exercise and shower immediately after exercise and change clothes  f. using polyurethane condoms without spermicide if sexually active and symptoms are triggered by intercourse     FOLLOW UP: PRN/lack of improvement.

## 2024-11-25 DIAGNOSIS — Z20.2 POTENTIAL EXPOSURE TO STD: Primary | ICD-10-CM

## 2024-12-10 ENCOUNTER — CLINICAL SUPPORT (OUTPATIENT)
Dept: SMOKING CESSATION | Facility: CLINIC | Age: 39
End: 2024-12-10

## 2024-12-10 DIAGNOSIS — F17.200 NICOTINE DEPENDENCE, UNCOMPLICATED: Primary | ICD-10-CM

## 2024-12-10 PROCEDURE — 99999 PR PBB SHADOW E&M-EST. PATIENT-LVL I: CPT | Mod: PBBFAC,,,

## 2024-12-10 NOTE — PROGRESS NOTES
Spoke with patient today in regard to smoking cessation progress for 12 month follow up. She states she is not tobacco free. Patient not interested at this time. Informed patient of benefit period, future follow ups and contact information if any further help is needed. Will complete/ resolve smart form for 6/12 month follow up for Quit # 1.

## 2025-01-10 ENCOUNTER — OFFICE VISIT (OUTPATIENT)
Dept: PRIMARY CARE CLINIC | Facility: CLINIC | Age: 40
End: 2025-01-10
Payer: COMMERCIAL

## 2025-01-10 ENCOUNTER — HOSPITAL ENCOUNTER (OUTPATIENT)
Dept: RADIOLOGY | Facility: HOSPITAL | Age: 40
Discharge: HOME | End: 2025-01-10
Payer: COMMERCIAL

## 2025-01-10 ENCOUNTER — TELEPHONE (OUTPATIENT)
Dept: PULMONOLOGY | Facility: CLINIC | Age: 40
End: 2025-01-10
Payer: COMMERCIAL

## 2025-01-10 VITALS
BODY MASS INDEX: 36.14 KG/M2 | HEART RATE: 93 BPM | DIASTOLIC BLOOD PRESSURE: 78 MMHG | OXYGEN SATURATION: 96 % | WEIGHT: 172.19 LBS | SYSTOLIC BLOOD PRESSURE: 104 MMHG | HEIGHT: 58 IN

## 2025-01-10 DIAGNOSIS — Z80.0 FAMILY HISTORY OF COLON CANCER IN MOTHER: ICD-10-CM

## 2025-01-10 DIAGNOSIS — E66.812 CLASS 2 OBESITY WITH BODY MASS INDEX (BMI) OF 35.0 TO 35.9 IN ADULT, UNSPECIFIED OBESITY TYPE, UNSPECIFIED WHETHER SERIOUS COMORBIDITY PRESENT: ICD-10-CM

## 2025-01-10 DIAGNOSIS — R05.9 COUGH, UNSPECIFIED TYPE: Primary | ICD-10-CM

## 2025-01-10 DIAGNOSIS — N92.0 MENORRHAGIA WITH REGULAR CYCLE: ICD-10-CM

## 2025-01-10 DIAGNOSIS — G47.33 OSA (OBSTRUCTIVE SLEEP APNEA): ICD-10-CM

## 2025-01-10 DIAGNOSIS — Z00.00 PREVENTATIVE HEALTH CARE: ICD-10-CM

## 2025-01-10 DIAGNOSIS — Z87.891 PERSONAL HISTORY OF NICOTINE DEPENDENCE: ICD-10-CM

## 2025-01-10 DIAGNOSIS — Z97.5 IUD (INTRAUTERINE DEVICE) IN PLACE: ICD-10-CM

## 2025-01-10 DIAGNOSIS — Z12.11 SCREENING FOR COLON CANCER: ICD-10-CM

## 2025-01-10 DIAGNOSIS — R06.2 WHEEZING: ICD-10-CM

## 2025-01-10 PROCEDURE — 71046 X-RAY EXAM CHEST 2 VIEWS: CPT | Mod: 26,,, | Performed by: RADIOLOGY

## 2025-01-10 PROCEDURE — 71046 X-RAY EXAM CHEST 2 VIEWS: CPT | Mod: TC

## 2025-01-10 PROCEDURE — 99999 PR PBB SHADOW E&M-EST. PATIENT-LVL IV: CPT | Mod: PBBFAC,,,

## 2025-01-10 RX ORDER — ALBUTEROL SULFATE 90 UG/1
2 INHALANT RESPIRATORY (INHALATION) EVERY 6 HOURS PRN
Qty: 18 G | Refills: 0 | Status: SHIPPED | OUTPATIENT
Start: 2025-01-10 | End: 2026-01-10

## 2025-01-10 RX ORDER — BENZONATATE 100 MG/1
100 CAPSULE ORAL 3 TIMES DAILY PRN
Qty: 30 CAPSULE | Refills: 0 | Status: SHIPPED | OUTPATIENT
Start: 2025-01-10 | End: 2025-01-20

## 2025-01-10 NOTE — PROGRESS NOTES
[facility] Progress Note  1/10/2025          Reason for Visit:      had concerns including Annual Exam, Establish Care, and Cough.       History of Present Illness:       Health Maintenance Due   Topic Date Due    Hemoglobin A1c (Diabetic Prevention Screening)  Never done     History of Present Illness    CHIEF COMPLAINT:  38 yo female with GERONIMO (CPAP), chronic cough, tobacco use ( 1ppd x 23 yrs)  presents today for an annual checkup and to address a persistent cough.    RESPIRATORY:  She reports a cough with phlegm and wheezing for a few weeks. She denies chest pain or tightness. Tried tessalon pearls in past with success, wants to follow up with pulm for PFT.     SOCIAL HISTORY:  She has been smoking since age 16 and currently smokes one pack per day. Not ready to quit at this time. She consumes approximately two tequila drinks per week socially.    DIET AND EXERCISE:  She exercises regularly, attending the gym 4-5 times per week. She maintains a relatively clean diet with minimal processed foods, with fast food consumption approximately twice per month.   She previously tried weight loss injections through a weight loss clinic, discontinued 2 months ago, resulting in minimal weight loss of 7-10 lbs.    GYNECOLOGICAL HISTORY:  Last pap smear was in 2018 with subsequent colposcopy. She reports regular monthly menstrual cycles lasting 7 days with heavy bleeding requiring super plus tampons. Follows up with gyn regularly.     FAMILY HISTORY:  Mother had rectal and cervical cancer at age 33.    SURGICAL HISTORY:  History of right shoulder surgery for repair of a tear following a fall down stairs.    IMMUNIZATIONS:  She received the first two COVID boosters but declines further COVID vaccinations. She is due for tetanus vaccine.      PAP: hx of ASCUS- 2018, colpo done and wnl, IUD in place, follow up with gyn for repeat pap  Colorectal screen: due now, mother has hx of colorectal cancer  Lung  cancer: (50-80): due at age 50  Mammogram: (40-74) due next year  TDAP/FLU/COVID/PCV: declined at this time   Smoking- 1ppd x since 16 years old.   Etoh- socially 2 drinks/ week - tequilla.   Diet- ozempic in July till October, with minimal weight loss of 7-10 pounds  Exercise- 4-5 times a week at the gym              Assessment & Plan      IMPRESSION:  - Ordered chest XR  due to wheezing and cough  - Considering short-course albuterol for wheezing  - Referral for pulm for pulmonary function test  - Lung cancer screening to be considered at age 50 due to smoking history  - Basic labs ordered to assess overall health status  - Thyroid function and iron panel  to be checked due to temperature regulation concerns  - Evaluating for potential inflammation of bronchi related to smoking      COUGH AND WHEEZING:  - Patient reports cough for 2 weeks, deeper than usual, with occasional phlegm and wheezing.  - Prescribed albuterol inhaler to be used as needed for wheezing.  - Prescribed Tessalon (benzonatate) for cough.  - Ordered chest XR.  - Referred the patient to pulmonology for full lung function test, pending x-ray results.  - Advised follow up in 1 week if symptoms do not improve.    Tobacco use   - Discussed long-term health effects of smoking with the patient.  - Advised the patient to quit or decrease smoking.  - Patient reports smoking 1 PPD since age 16.  - Suggested nicotine replacement therapy options, declined at this time  - smoking cessation referral in place and attempted to contact patient several times, not motivated to quit at this time.    Obesity   - BMI 35.99  -  gym 4-5 times per week  - She maintains a relatively clean diet with minimal processed foods, with fast food consumption approximately twice per month.   -She previously tried weight loss injections through a weight loss clinic, discontinued 2 months ago, resulting in minimal weight loss of 7-10 lbs.  - Diet and lifestyle modifications  discussed  - recommend moderate intensity exercise 45 min 4-5 times a week (150min total/week)   - monitor weight loss    SLEEP APNEA:  - compliant with CPAP    Menorrhagia  - Patient reports regular but heavy menstrual periods lasting 7 days.  - copper iud in place   - follow up cbc  - follow up with gyn     Colon cancer hx in mother  - Educated the patient on importance of colorectal cancer screening due to family history.  - follow up colonoscopy     VACCINATIONS:  - Patient declined further COVID and influenza vaccinations.  - Informed the patient about tetanus vaccine and its importance, declined at this time             Problem List:     Patient Active Problem List   Diagnosis    Parotiditis    Upper respiratory tract infection    Family history of colon cancer in mother    Personal history of nicotine dependence    Decreased ROM of right shoulder    Chronic right shoulder pain    Class 2 obesity with body mass index (BMI) of 35.0 to 35.9 in adult    GERONIMO (obstructive sleep apnea)    IUD (intrauterine device) in place    Cough    Wheezing    Menorrhagia with regular cycle         Medications:       Current Outpatient Medications:     copper intrauterine device (PARAGARD T 380A) 380 square mm IUD, 1 each by Intrauterine route., Disp: , Rfl:     albuterol (VENTOLIN HFA) 90 mcg/actuation inhaler, Inhale 2 puffs into the lungs every 6 (six) hours as needed for Wheezing. Rescue, Disp: 18 g, Rfl: 0    benzonatate (TESSALON) 100 MG capsule, Take 1 capsule (100 mg total) by mouth 3 (three) times daily as needed for Cough., Disp: 30 capsule, Rfl: 0        Review of Systems:     Review of Systems   Constitutional:  Negative for chills and fever.   HENT:  Negative for nasal congestion, postnasal drip, rhinorrhea, sore throat and trouble swallowing.    Respiratory:  Positive for cough (with phlegm) and wheezing. Negative for chest tightness.    Cardiovascular:  Negative for chest pain and palpitations.   Gastrointestinal:   "Negative for change in bowel habit, constipation, diarrhea, nausea and vomiting.   Endocrine: Positive for heat intolerance.   Genitourinary:  Positive for menstrual problem (heavy menses). Negative for dysuria, vaginal bleeding and vaginal dryness.   Musculoskeletal:  Negative for arthralgias.   Integumentary:  Negative for rash.   Neurological:  Negative for dizziness, weakness and headaches.   Psychiatric/Behavioral:  Negative for sleep disturbance. The patient is not nervous/anxious.            Physical Exam:     Temp:             Blood Pressure:  104/78        Pulse:   93     Respirations:     Weight:   78.1 kg (172 lb 2.9 oz)  Height:   4' 10" (1.473 m)  BMI:     Body mass index is 35.99 kg/m².    Physical Exam  Vitals reviewed.   Constitutional:       General: She is not in acute distress.     Appearance: Normal appearance. She is obese. She is not ill-appearing, toxic-appearing or diaphoretic.   HENT:      Head: Normocephalic and atraumatic.      Right Ear: Tympanic membrane, ear canal and external ear normal. There is no impacted cerumen.      Left Ear: Tympanic membrane, ear canal and external ear normal. There is no impacted cerumen.      Nose: Nose normal. No congestion or rhinorrhea.   Cardiovascular:      Rate and Rhythm: Normal rate and regular rhythm.      Pulses: Normal pulses.      Heart sounds: Normal heart sounds. No murmur heard.     No friction rub.   Pulmonary:      Effort: Pulmonary effort is normal. No respiratory distress.      Breath sounds: No wheezing, rhonchi or rales.   Musculoskeletal:      Cervical back: Normal range of motion.   Neurological:      General: No focal deficit present.      Mental Status: She is alert and oriented to person, place, and time. Mental status is at baseline.      Cranial Nerves: No cranial nerve deficit.      Motor: No weakness.      Gait: Gait normal.   Psychiatric:         Mood and Affect: Mood normal.         Behavior: Behavior normal.          Physical " "Exam             Labs/Imaging/Testing:     Lab Results   Component Value Date    WBC 7.95 11/03/2023    HGB 13.2 11/03/2023    HCT 39.4 11/03/2023    MCV 90 11/03/2023     11/03/2023        CMP  Sodium   Date Value Ref Range Status   11/03/2023 140 136 - 145 mmol/L Final     Potassium   Date Value Ref Range Status   11/03/2023 4.1 3.5 - 5.1 mmol/L Final     Chloride   Date Value Ref Range Status   11/03/2023 104 95 - 110 mmol/L Final     CO2   Date Value Ref Range Status   11/03/2023 27 23 - 29 mmol/L Final     Glucose   Date Value Ref Range Status   11/03/2023 113 (H) 70 - 110 mg/dL Final     BUN   Date Value Ref Range Status   11/03/2023 9 6 - 20 mg/dL Final     Creatinine   Date Value Ref Range Status   11/03/2023 0.7 0.5 - 1.4 mg/dL Final     Calcium   Date Value Ref Range Status   11/03/2023 10.0 8.7 - 10.5 mg/dL Final     Total Protein   Date Value Ref Range Status   11/03/2023 7.5 6.0 - 8.4 g/dL Final     Albumin   Date Value Ref Range Status   11/03/2023 4.0 3.5 - 5.2 g/dL Final     Total Bilirubin   Date Value Ref Range Status   11/03/2023 0.3 0.1 - 1.0 mg/dL Final     Comment:     For infants and newborns, interpretation of results should be based  on gestational age, weight and in agreement with clinical  observations.    Premature Infant recommended reference ranges:  Up to 24 hours.............<8.0 mg/dL  Up to 48 hours............<12.0 mg/dL  3-5 days..................<15.0 mg/dL  6-29 days.................<15.0 mg/dL       Alkaline Phosphatase   Date Value Ref Range Status   11/03/2023 50 (L) 55 - 135 U/L Final     AST   Date Value Ref Range Status   11/03/2023 17 10 - 40 U/L Final     ALT   Date Value Ref Range Status   11/03/2023 20 10 - 44 U/L Final     Anion Gap   Date Value Ref Range Status   11/03/2023 9 8 - 16 mmol/L Final     eGFR   Date Value Ref Range Status   11/03/2023 >60.0 >60 mL/min/1.73 m^2 Final       No results found for: "TSH"    No results found for: "LABA1C", " ""HGBA1C"    Lab Results   Component Value Date    CHOL 194 11/03/2023     Lab Results   Component Value Date    HDL 48 11/03/2023     Lab Results   Component Value Date    LDLCALC 118.8 11/03/2023     Lab Results   Component Value Date    TRIG 136 11/03/2023       Lab Results   Component Value Date    CHOLHDL 24.7 11/03/2023               Assessment and Plan:         1. Cough, unspecified type  -     benzonatate (TESSALON) 100 MG capsule; Take 1 capsule (100 mg total) by mouth 3 (three) times daily as needed for Cough.  Dispense: 30 capsule; Refill: 0    2. [facility] health care  -     Comprehensive Metabolic Panel; Future; Expected date: 01/10/2025  -     Lipid Panel; Future; Expected date: 01/10/2025  -     CBC Auto Differential; Future; Expected date: 01/10/2025  -     IRON AND TIBC; Future; Expected date: 01/10/2025  -     FERRITIN; Future; Expected date: 01/10/2025  -     TSH; Future; Expected date: 01/10/2025    3. Wheezing  -     Ambulatory referral/consult to Pulmonology; Future; Expected date: 01/10/2025  -     albuterol (VENTOLIN HFA) 90 mcg/actuation inhaler; Inhale 2 puffs into the lungs every 6 (six) hours as needed for Wheezing. Rescue  Dispense: 18 g; Refill: 0  -     X-Ray Chest PA And Lateral; Future; Expected date: 01/10/2025    4. Class 2 obesity with body mass index (BMI) of 35.0 to 35.9 in adult, unspecified obesity type, unspecified whether serious comorbidity present  -     Hemoglobin A1C; Future; Expected date: 01/10/2025    5. Menorrhagia with regular cycle  -     CBC Auto Differential; Future; Expected date: 01/10/2025  -     IRON AND TIBC; Future; Expected date: 01/10/2025  -     FERRITIN; Future; Expected date: 01/10/2025  -     TSH; Future; Expected date: 01/10/2025    6. Personal history of nicotine dependence    7. GERONIMO (obstructive sleep apnea)  -     Ambulatory referral/consult to Pulmonology; Future; Expected date: 01/10/2025    8. IUD (intrauterine device) in " place  Overview:  paraguard      9. Screening for colon cancer  -     Ambulatory referral/consult to Endo Procedure ; Future; Expected date: 01/10/2025    10. Family history of colon cancer in mother           Follow up if symptoms worsen or fail to improve.     Adelita Funes MD  [facility]   1/10/2025    This note was generated with the assistance of ambient listening technology. Verbal consent was obtained by the patient and accompanying visitor(s) for the recording of patient appointment to facilitate this note. I attest to having reviewed and edited the generated note for accuracy, though some syntax or spelling errors may persist. Please contact the author of this note for any clarification.       Thank you for the opportunity to care for you. We strive to always provide excellent care. Please complete a survey if you receive one and let us know how we are doing    This note was prepared using voice-recognition software.  Although efforts are made to proofread the note, some errors may persist in the final document.

## 2025-01-15 NOTE — PROGRESS NOTES
Subjective:      Patient ID: Paul Fitzgerald is a 39 y.o. female.    Chief Complaint: Cough, Shortness of Breath, and Wheezing    Pt is a 40 yo CW pmh tobacco use disorder, obesity, GERONIMO who presents for evaluation of cough. Cough starting prior to holidays.     Smoking hx: active, ppd, started at 17.   Work hx: , from home  Exposure hx: possible mold exposure, no chem/fumes,   Inhaler use: none  PRN inhaler use: albuterol  Hx of lung dz: none  Family hx of lung dz: not immediate family, unsure extended.      Allergies- zyrtec   Quintana leaf drops    Review of Systems   Constitutional:  Negative for activity change.   Respiratory:  Positive for cough, sputum production, shortness of breath, wheezing and use of rescue inhaler. Negative for dyspnea on extertion.    Cardiovascular:  Negative for chest pain, palpitations and leg swelling.   Gastrointestinal:  Negative for nausea, vomiting and acid reflux.   Psychiatric/Behavioral:  Negative for confusion and sleep disturbance. The patient is not nervous/anxious.      Objective:     Physical Exam   Constitutional: She is oriented to person, place, and time. She appears well-developed and well-nourished. No distress. She is obese.   HENT:   Head: Normocephalic.   Cardiovascular: Normal rate, regular rhythm and normal heart sounds.   Pulmonary/Chest: Normal expansion, symmetric chest wall expansion, effort normal and breath sounds normal. She has no wheezes. She has no rhonchi. She has no rales.   Musculoskeletal:         General: No edema.   Neurological: She is alert and oriented to person, place, and time. Gait normal.   Skin: No cyanosis. Nails show no clubbing.   Psychiatric: She has a normal mood and affect. Her behavior is normal. Judgment and thought content normal.   Vitals reviewed.    Personal Diagnostic Review    Chest x-ray: 1/10/25  No acute cardiopulmonary process    Pulmonary function tests:   1/17/24  FEV1: 2.33L  (98 % predicted),   FVC:  2.92L  "(102.5 % predicted),   FEV1/FVC:  80,   T.32L (114.8 % predicted),   DLCO: 17.07 (78.9 % predicted)        1/10/2025     9:07 AM 2024    10:38 AM 2024     9:13 AM 2024    10:54 AM 2024     9:05 AM 2024    10:02 AM 3/26/2024    10:32 AM   Pulmonary Function Tests   SpO2 96 %         Height 4' 10" (1.473 m)  4' 10" (1.473 m) 4' 10" (1.473 m) 4' 10" (1.473 m) 4' 10" (1.473 m) 4' 10" (1.473 m)   Weight 78.1 kg (172 lb 2.9 oz) 77 kg (169 lb 12.1 oz) 75.5 kg (166 lb 7.2 oz) 79.2 kg (174 lb 9.7 oz) 79 kg (174 lb 2.6 oz) 77.8 kg (171 lb 8.3 oz) 75.8 kg (167 lb 1.7 oz)   BMI (Calculated) 36  34.8 36.5 36.4 35.9 34.9     Assessment:     1. Seasonal allergic rhinitis due to pollen    2. Subacute cough    3. Tobacco use disorder, severe, dependence    4. Class 2 obesity due to excess calories without serious comorbidity with body mass index (BMI) of 37.0 to 37.9 in adult      Outpatient Encounter Medications as of 2025   Medication Sig Dispense Refill    albuterol (VENTOLIN HFA) 90 mcg/actuation inhaler Inhale 2 puffs into the lungs every 6 (six) hours as needed for Wheezing. Rescue 18 g 0    benzonatate (TESSALON) 100 MG capsule Take 1 capsule (100 mg total) by mouth 3 (three) times daily as needed for Cough. 30 capsule 0    copper intrauterine device (PARAGARD T 380A) 380 square mm IUD 1 each by Intrauterine route.       No facility-administered encounter medications on file as of 2025.     No orders of the defined types were placed in this encounter.    Plan:     Seasonal allergic rhinitis due to pollen  Feels like occurs due to ventilation system and dust from heater leading to sinus congestion and rhinorrhea. Exacerbated by smoking.   - switch zyrtec to allegra and start sinus rinses. Use sinus sprays after rinses for improved efficacy.     Cough  Previous URI like syndrome along with probable post nasal drip. Treat as per allergic rhinitis.     Tobacco use disorder, severe, " dependence  Dangers of cigarette smoking were reviewed with patient in detail. Patient was Counseled for 3-10 minutes. Nicotine replacement options were discussed. Nicotine replacement was discussed- not prescribed per patient's request. Pt has worked with smoking cessation program. Attempted to quit cold turkey and use patches, which were not beneficial. Discussed Wellbutrin and chantix, but patient currently not ready to quit at this time.     Class 2 obesity due to excess calories without serious comorbidity with body mass index (BMI) of 37.0 to 37.9 in adult  Pt actively exercising. Continue diet control.     Follow up as needed.      Regan Lynne MD  [facility]

## 2025-01-17 ENCOUNTER — OFFICE VISIT (OUTPATIENT)
Dept: PULMONOLOGY | Facility: CLINIC | Age: 40
End: 2025-01-17
Payer: COMMERCIAL

## 2025-01-17 ENCOUNTER — HOSPITAL ENCOUNTER (OUTPATIENT)
Dept: PULMONOLOGY | Facility: CLINIC | Age: 40
Discharge: HOME | End: 2025-01-17

## 2025-01-17 ENCOUNTER — HOSPITAL ENCOUNTER (OUTPATIENT)
Dept: PULMONOLOGY | Facility: CLINIC | Age: 40
Discharge: HOME | End: 2025-01-17
Payer: COMMERCIAL

## 2025-01-17 VITALS
OXYGEN SATURATION: 99 % | WEIGHT: 172 LBS | DIASTOLIC BLOOD PRESSURE: 76 MMHG | SYSTOLIC BLOOD PRESSURE: 112 MMHG | HEART RATE: 80 BPM | HEIGHT: 57 IN | BODY MASS INDEX: 37.11 KG/M2

## 2025-01-17 DIAGNOSIS — R05.9 COUGH, UNSPECIFIED TYPE: ICD-10-CM

## 2025-01-17 DIAGNOSIS — F17.200 TOBACCO USE DISORDER, SEVERE, DEPENDENCE: ICD-10-CM

## 2025-01-17 DIAGNOSIS — E66.09 CLASS 2 OBESITY DUE TO EXCESS CALORIES WITHOUT SERIOUS COMORBIDITY WITH BODY MASS INDEX (BMI) OF 37.0 TO 37.9 IN ADULT: ICD-10-CM

## 2025-01-17 DIAGNOSIS — R05.2 SUBACUTE COUGH: ICD-10-CM

## 2025-01-17 DIAGNOSIS — E66.812 CLASS 2 OBESITY DUE TO EXCESS CALORIES WITHOUT SERIOUS COMORBIDITY WITH BODY MASS INDEX (BMI) OF 37.0 TO 37.9 IN ADULT: ICD-10-CM

## 2025-01-17 DIAGNOSIS — J30.1 SEASONAL ALLERGIC RHINITIS DUE TO POLLEN: Primary | ICD-10-CM

## 2025-01-17 PROBLEM — R06.2 WHEEZING: Status: RESOLVED | Noted: 2025-01-10 | Resolved: 2025-01-17

## 2025-01-17 LAB
DLCO ADJ PRE: 17.07 ML/(MIN*MMHG) (ref 15.89–27.35)
DLCO SINGLE BREATH LLN: 15.89
DLCO SINGLE BREATH PRE REF: 78.9 %
DLCO SINGLE BREATH REF: 21.62
DLCOC SBVA LLN: 3.6
DLCOC SBVA PRE REF: 80.4 %
DLCOC SBVA REF: 5.74
DLCOC SINGLE BREATH LLN: 15.89
DLCOC SINGLE BREATH PRE REF: 78.9 %
DLCOC SINGLE BREATH REF: 21.62
DLCOCSBVAULN: 7.89
DLCOCSINGLEBREATHULN: 27.35
DLCOCSINGLEBREATHZSCORE: -1.31
DLCOSINGLEBREATHULN: 27.35
DLCOSINGLEBREATHZSCORE: -1.31
DLCOVA LLN: 3.6
DLCOVA PRE REF: 80.4 %
DLCOVA PRE: 4.62 ML/(MIN*MMHG*L) (ref 3.6–7.89)
DLCOVA REF: 5.74
DLCOVAULN: 7.89
DLVAADJ PRE: 4.62 ML/(MIN*MMHG*L) (ref 3.6–7.89)
ERV LLN: -16448.95
ERV PRE REF: 113.3 %
ERV REF: 1.05
ERVULN: ABNORMAL
FEF 25 75 LLN: 2.04
FEF 25 75 PRE REF: 56.8 %
FEF 25 75 REF: 3.44
FEV05 LLN: 0.96
FEV05 REF: 1.81
FEV1 FVC LLN: 72
FEV1 FVC PRE REF: 95.6 %
FEV1 FVC REF: 84
FEV1 LLN: 1.9
FEV1 PRE REF: 98 %
FEV1 REF: 2.38
FEV1FVCZSCORE: -0.59
FEV1ZSCORE: -0.16
FRCPLETH LLN: 1.46
FRCPLETH PREREF: 113.9 %
FRCPLETH REF: 2.28
FRCPLETHULN: 3.1
FVC LLN: 2.27
FVC PRE REF: 102.5 %
FVC REF: 2.85
FVCZSCORE: 0.2
IVC PRE: 2.73 L (ref 2.27–3.44)
IVC SINGLE BREATH LLN: 2.27
IVC SINGLE BREATH PRE REF: 95.7 %
IVC SINGLE BREATH REF: 2.85
IVCSINGLEBREATHULN: 3.44
LLN IC: -16448.5
PEF LLN: 4.62
PEF PRE REF: 73.6 %
PEF REF: 5.97
PHYSICIAN COMMENT: ABNORMAL
PRE DLCO: 17.07 ML/(MIN*MMHG) (ref 15.89–27.35)
PRE ERV: 1.19 L (ref -16448.95–16451.05)
PRE FEF 25 75: 1.95 L/S (ref 2.04–4.84)
PRE FET 100: 4.13 SEC
PRE FEV05 REF: 89.1 %
PRE FEV1 FVC: 79.96 % (ref 72.44–92.84)
PRE FEV1: 2.33 L (ref 1.9–2.85)
PRE FEV5: 1.62 L (ref 0.96–2.67)
PRE FRC PL: 2.6 L (ref 1.46–3.1)
PRE FVC: 2.92 L (ref 2.27–3.44)
PRE IC: 1.73 L (ref -16448.5–16451.5)
PRE PEF: 4.4 L/S (ref 4.62–7.33)
PRE REF IC: 114.9 %
PRE RV: 1.41 L (ref 0.65–1.8)
PRE TLC: 4.32 L (ref 2.78–4.75)
RAW PRE REF: 130.3 %
RAW PRE: 3.99 CMH2O*S/L (ref 3.06–3.06)
RAW REF: 3.06
REF IC: 1.5
RV LLN: 0.65
RV PRE REF: 114.4 %
RV REF: 1.23
RVTLC LLN: 22
RVTLC PRE REF: 101.9 %
RVTLC PRE: 32.5 % (ref 22.29–41.47)
RVTLC REF: 32
RVTLCULN: 41
RVULN: 1.8
SGAW PRE REF: 75.1 %
SGAW PRE: 0.08 1/(CMH2O*S) (ref 0.1–0.1)
SGAW REF: 0.1
TLC LLN: 2.78
TLC PRE REF: 114.8 %
TLC REF: 3.77
TLC ULN: 4.75
TLCZSCORE: 0.93
ULN IC: ABNORMAL
VA PRE: 3.7 L (ref 3.62–3.62)
VA SINGLE BREATH LLN: 3.62
VA SINGLE BREATH PRE REF: 102.2 %
VA SINGLE BREATH REF: 3.62
VASINGLEBREATHULN: 3.62
VC LLN: 2.27
VC PRE REF: 102.5 %
VC PRE: 2.92 L (ref 2.27–3.44)
VC REF: 2.85
VC ULN: 3.44

## 2025-01-17 PROCEDURE — 94729 DIFFUSING CAPACITY: CPT | Mod: PBBFAC | Performed by: INTERNAL MEDICINE

## 2025-01-17 PROCEDURE — 94726 PLETHYSMOGRAPHY LUNG VOLUMES: CPT | Mod: PBBFAC | Performed by: INTERNAL MEDICINE

## 2025-01-17 PROCEDURE — 94010 BREATHING CAPACITY TEST: CPT | Mod: PBBFAC | Performed by: INTERNAL MEDICINE

## 2025-01-17 PROCEDURE — 99213 OFFICE O/P EST LOW 20 MIN: CPT | Mod: PBBFAC | Performed by: INTERNAL MEDICINE

## 2025-01-17 NOTE — ASSESSMENT & PLAN NOTE
Dangers of cigarette smoking were reviewed with patient in detail. Patient was Counseled for 3-10 minutes. Nicotine replacement options were discussed. Nicotine replacement was discussed- not prescribed per patient's request. Pt has worked with smoking cessation program. Attempted to quit cold turkey and use patches, which were not beneficial. Discussed Wellbutrin and chantix, but patient currently not ready to quit at this time.

## 2025-01-17 NOTE — ASSESSMENT & PLAN NOTE
Feels like occurs due to ventilation system and dust from heater leading to sinus congestion and rhinorrhea. Exacerbated by smoking.   - switch zyrtec to allegra and start sinus rinses. Use sinus sprays after rinses for improved efficacy.

## 2025-01-21 ENCOUNTER — TELEPHONE (OUTPATIENT)
Dept: ENDOSCOPY | Facility: HOSPITAL | Age: 40
End: 2025-01-21
Payer: COMMERCIAL

## 2025-01-21 NOTE — TELEPHONE ENCOUNTER
Endoscopy unit  will be closed on 1/21/25 due to weather circumstances. Attempted to notify patient by phone.  Left voicemail and sent portal message stating that Endoscopy scheduling team will contact patient to reschedule PAT appointment.

## 2025-03-13 ENCOUNTER — OFFICE VISIT (OUTPATIENT)
Dept: URGENT CARE | Facility: CLINIC | Age: 40
End: 2025-03-13
Payer: COMMERCIAL

## 2025-03-13 VITALS
HEART RATE: 71 BPM | WEIGHT: 172 LBS | HEIGHT: 57 IN | DIASTOLIC BLOOD PRESSURE: 66 MMHG | SYSTOLIC BLOOD PRESSURE: 102 MMHG | BODY MASS INDEX: 37.11 KG/M2 | TEMPERATURE: 99 F | OXYGEN SATURATION: 96 % | RESPIRATION RATE: 19 BRPM

## 2025-03-13 DIAGNOSIS — J01.90 ACUTE BACTERIAL SINUSITIS: Primary | ICD-10-CM

## 2025-03-13 DIAGNOSIS — B96.89 ACUTE BACTERIAL SINUSITIS: Primary | ICD-10-CM

## 2025-03-13 PROCEDURE — 99213 OFFICE O/P EST LOW 20 MIN: CPT | Mod: S$GLB,,, | Performed by: NURSE PRACTITIONER

## 2025-03-13 RX ORDER — PROMETHAZINE HYDROCHLORIDE AND DEXTROMETHORPHAN HYDROBROMIDE 6.25; 15 MG/5ML; MG/5ML
5 SYRUP ORAL EVERY 4 HOURS PRN
Qty: 180 ML | Refills: 0 | Status: SHIPPED | OUTPATIENT
Start: 2025-03-13 | End: 2025-03-23

## 2025-03-13 RX ORDER — DOXYCYCLINE 100 MG/1
100 CAPSULE ORAL 2 TIMES DAILY
Qty: 14 CAPSULE | Refills: 0 | Status: SHIPPED | OUTPATIENT
Start: 2025-03-13 | End: 2025-03-20

## 2025-03-13 NOTE — PROGRESS NOTES
Subjective:      Patient ID: Paul Fitzgerald is a 40 y.o. female.    Vitals:  vitals were not taken for this visit.     Chief Complaint: Cough (Entered by patient)    Pt is a 40 y.o. female with the complaint of cough   Symptoms began     Cough    Respiratory:  Positive for cough.     Objective:     Physical Exam    Assessment:     No diagnosis found.    Plan:       There are no diagnoses linked to this encounter.

## 2025-03-13 NOTE — PATIENT INSTRUCTIONS
"Acute bacterial sinusitis    -     doxycycline (VIBRAMYCIN) 100 MG Cap; Take 1 capsule (100 mg total) by mouth 2 (two) times daily. for 7 days  Dispense: 14 capsule; Refill: 0      - Rest at home.     - Drink plenty of fluids so you won't get dehydrated.    Avoid taking Decongestants such as pseudoephedrine (ex. Sudafed) or phenylephrine (ex. Mucinex FastMax, Dayquil, Nyquil, or any combo cold meds that say "cold," "sinus" or "-D").        - Cough recommendations:   Warm tea with honey can help with cough. Honey is a natural cough suppressant.     NIGHTTIME:  -     (PROMETHAZINE-DM) promethazine-dextromethorphan 6.25-15 mg/5 mL Syrp; Take 5 mLs by mouth every 4 (four) hours as needed (cough).    +  Mucinex (guaifenesin) twice a day (or as directed) to help loosen mucous.       OR    DAYTIME:   Mucinex DM (guaifenesin + dextromethorphan).        - Fever/Pain recommendations:  Alternate Tylenol or Ibuprofen as directed for fever/pain.   - Motrin/Ibuprofen every 6-8 hours for pain and inflammation. Do not take ibuprofen if you have a history of GI bleeding, kidney disease, or if you take blood thinners.    - Tylenol/acetaminophen every 6-8 hours for added pain relief.  Avoid tylenol if you have a history of liver disease.       -Sore throat recommendations: Warm fluids, warm salt water gargles, throat lozenges, tea, honey, soup, or drinking something cold or frozen.  Throat lozenges or sprays help reduce pain. Gargling with warm saltwater (1/4 teaspoon of salt in 1/2 cup of warm water) or an OTC anesthetic gargle may be useful for irritation.    Follow up with PCP if symptoms worsen or do not resolve fully.    When to seek medical advice  Call your healthcare provider right away if any of these occur:  Fever that is poorly controlled with OTC fever reducing medication  New or worsening ear pain, sinus pain, or headache  Stiff neck  You can't swallow liquids or you can't open your mouth wide because of throat " pain  Signs of dehydration. These include very dark urine or no urine, sunken eyes, and dizziness.  Trouble breathing or noisy breathing  Muffled voice  Rash

## 2025-03-13 NOTE — PROGRESS NOTES
"Subjective:      Patient ID: Paul Fitzgerald is a 40 y.o. female.    Vitals:  height is 4' 9" (1.448 m) and weight is 78 kg (172 lb). Her oral temperature is 98.9 °F (37.2 °C). Her blood pressure is 102/66 and her pulse is 71. Her respiration is 19 and oxygen saturation is 96%.     Chief Complaint: Cough (Entered by patient)    Pt is a 40 y.o. female with the complaint of a fatigue, congestion, and productive cough with sputum for the past week and a half.  She states she has sneezing from time to time.  Symptoms began about a week and a half ago     Cough  This is a new problem. The current episode started 1 to 4 weeks ago. The problem has been unchanged. The cough is Productive of sputum. Associated symptoms include postnasal drip. Pertinent negatives include no chest pain, chills, ear congestion, ear pain, fever, headaches, heartburn, hemoptysis, myalgias, nasal congestion, rash, rhinorrhea, sore throat, shortness of breath, sweats, weight loss or wheezing. Nothing aggravates the symptoms. She has tried nothing for the symptoms. There is no history of asthma, bronchiectasis, bronchitis, COPD, emphysema, environmental allergies or pneumonia.       Constitution: Positive for fatigue. Negative for chills and fever.   HENT:  Positive for congestion and postnasal drip. Negative for ear pain, sinus pain and sore throat.    Cardiovascular:  Negative for chest pain.   Respiratory:  Positive for cough and sputum production. Negative for bloody sputum, shortness of breath and wheezing.    Gastrointestinal:  Negative for nausea, vomiting, diarrhea and heartburn.   Musculoskeletal:  Negative for muscle ache.   Skin:  Negative for rash.   Allergic/Immunologic: Negative for environmental allergies.   Neurological:  Negative for headaches.      Objective:     Physical Exam   Constitutional: She is oriented to person, place, and time.   HENT:   Head: Normocephalic.   Ears:   Right Ear: Hearing and external ear normal. No no " drainage, swelling or tenderness. Tympanic membrane is not erythematous, not retracted and not bulging. No middle ear effusion.   Left Ear: Hearing and external ear normal. No no drainage, swelling or tenderness. Tympanic membrane is not erythematous, not retracted and not bulging.  No middle ear effusion.   Nose: Mucosal edema, rhinorrhea and purulent discharge present. Right sinus exhibits frontal sinus tenderness. Right sinus exhibits no maxillary sinus tenderness. Left sinus exhibits frontal sinus tenderness. Left sinus exhibits no maxillary sinus tenderness.   Mouth/Throat: Uvula is midline and mucous membranes are normal. No trismus in the jaw. No uvula swelling. Posterior oropharyngeal erythema and cobblestoning present. No oropharyngeal exudate or posterior oropharyngeal edema. Tonsils are 2+ on the right. Tonsils are 2+ on the left. No tonsillar exudate.   Cardiovascular: Normal rate and regular rhythm.   Pulmonary/Chest: Effort normal and breath sounds normal. No accessory muscle usage or stridor. No respiratory distress. She has no decreased breath sounds. She has no wheezes. She has no rhonchi. She has no rales.   Neurological: She is alert and oriented to person, place, and time.   Skin: Skin is warm and dry.   Nursing note and vitals reviewed.      Assessment:     1. Acute bacterial sinusitis        Plan:       Acute bacterial sinusitis  -     doxycycline (VIBRAMYCIN) 100 MG Cap; Take 1 capsule (100 mg total) by mouth 2 (two) times daily. for 7 days  Dispense: 14 capsule; Refill: 0  -     promethazine-dextromethorphan (PROMETHAZINE-DM) 6.25-15 mg/5 mL Syrp; Take 5 mLs by mouth every 4 (four) hours as needed (cough).  Dispense: 180 mL; Refill: 0      Patient Instructions   Acute bacterial sinusitis    -     doxycycline (VIBRAMYCIN) 100 MG Cap; Take 1 capsule (100 mg total) by mouth 2 (two) times daily. for 7 days  Dispense: 14 capsule; Refill: 0      - Rest at home.     - Drink plenty of fluids so you  "won't get dehydrated.    Avoid taking Decongestants such as pseudoephedrine (ex. Sudafed) or phenylephrine (ex. Mucinex FastMax, Dayquil, Nyquil, or any combo cold meds that say "cold," "sinus" or "-D").        - Cough recommendations:   Warm tea with honey can help with cough. Honey is a natural cough suppressant.     NIGHTTIME:  -     (PROMETHAZINE-DM) promethazine-dextromethorphan 6.25-15 mg/5 mL Syrp; Take 5 mLs by mouth every 4 (four) hours as needed (cough).    +  Mucinex (guaifenesin) twice a day (or as directed) to help loosen mucous.       OR    DAYTIME:   Mucinex DM (guaifenesin + dextromethorphan).        - Fever/Pain recommendations:  Alternate Tylenol or Ibuprofen as directed for fever/pain.   - Motrin/Ibuprofen every 6-8 hours for pain and inflammation. Do not take ibuprofen if you have a history of GI bleeding, kidney disease, or if you take blood thinners.    - Tylenol/acetaminophen every 6-8 hours for added pain relief.  Avoid tylenol if you have a history of liver disease.       -Sore throat recommendations: Warm fluids, warm salt water gargles, throat lozenges, tea, honey, soup, or drinking something cold or frozen.  Throat lozenges or sprays help reduce pain. Gargling with warm saltwater (1/4 teaspoon of salt in 1/2 cup of warm water) or an OTC anesthetic gargle may be useful for irritation.    Follow up with PCP if symptoms worsen or do not resolve fully.    When to seek medical advice  Call your healthcare provider right away if any of these occur:  Fever that is poorly controlled with OTC fever reducing medication  New or worsening ear pain, sinus pain, or headache  Stiff neck  You can't swallow liquids or you can't open your mouth wide because of throat pain  Signs of dehydration. These include very dark urine or no urine, sunken eyes, and dizziness.  Trouble breathing or noisy breathing  Muffled voice  Rash                        "

## 2025-03-28 ENCOUNTER — CLINICAL SUPPORT (OUTPATIENT)
Dept: ENDOSCOPY | Facility: HOSPITAL | Age: 40
End: 2025-03-28
Payer: COMMERCIAL

## 2025-03-28 ENCOUNTER — TELEPHONE (OUTPATIENT)
Dept: ENDOSCOPY | Facility: HOSPITAL | Age: 40
End: 2025-03-28

## 2025-03-28 DIAGNOSIS — Z12.11 SCREENING FOR COLON CANCER: ICD-10-CM

## 2025-03-28 RX ORDER — POLYETHYLENE GLYCOL 3350, SODIUM SULFATE ANHYDROUS, SODIUM BICARBONATE, SODIUM CHLORIDE, POTASSIUM CHLORIDE 236; 22.74; 6.74; 5.86; 2.97 G/4L; G/4L; G/4L; G/4L; G/4L
4 POWDER, FOR SOLUTION ORAL ONCE
Qty: 4000 ML | Refills: 0 | Status: SHIPPED | OUTPATIENT
Start: 2025-03-28 | End: 2025-03-28

## 2025-03-28 NOTE — TELEPHONE ENCOUNTER
Referral for procedure from PAT appointment      Spoke to patient to schedule procedure(s) Colonoscopy       Physician to perform procedure(s) Dr. DEMETRI Cruz  Date of Procedure (s) 04/25/25  Arrival Time 09:30 AM  Time of Procedure(s) 10:30 AM   Location of Procedure(s) [place] [address] Floor  Type of Rx Prep sent to patient: PEG  Instructions provided to patient via [facility]    Patient was informed on the following information and verbalized understanding. Screening questionnaire reviewed with patient and complete. If procedure requires anesthesia, a responsible adult needs to be present to accompany the patient home, patient cannot drive after receiving anesthesia. Appointment details are tentative, especially check-in time. Patient will receive a prep-op call 7 days prior to confirm check-in time for procedure. If applicable the patient should contact their pharmacy to verify Rx for procedure prep is ready for pick-up. Patient was advised to call the scheduling department at [phone] if pharmacy states no Rx is available. Patient was advised to call the endoscopy scheduling department if any questions or concerns arise.      [facility] Scheduling Department

## 2025-04-23 ENCOUNTER — ANESTHESIA EVENT (OUTPATIENT)
Dept: ENDOSCOPY | Facility: HOSPITAL | Age: 40
End: 2025-04-23
Payer: COMMERCIAL

## 2025-04-23 NOTE — ANESTHESIA PREPROCEDURE EVALUATION
[facility]  Anesthesia Pre-Operative Evaluation       Patient Name: Paul Fitzgerald  YOB: 1985  MRN: [id number]  [facility]: [id number]      Code Status: Prior   Date of Procedure: 4/25/2025  Anesthesia: Choice Procedure: Procedure(s) (LRB):  COLONOSCOPY, SCREENING, LOW RISK PATIENT (N/A)  Pre-Operative Diagnosis: Screening for colon cancer [Z12.11]  Proceduralist: Surgeons and Role:     * Babak Cruz MD - Primary        SUBJECTIVE:   Paul Fitzgerald is a 40 y.o. female who  has no past medical history on file..     she has a current medication list which includes the following long-term medication(s): albuterol.     ALLERGIES:     Review of patient's allergies indicates:   Allergen Reactions    Penicillins Hives     LDA:          Lines/Drains/Airways       None                  Anesthesia Evaluation      Airway   Dental      Pulmonary    (+) sleep apnea  Cardiovascular     Rate: Normal    Neuro/Psych    (+) psychiatric history    GI/Hepatic/Renal    (+) bowel prep    Endo/Other    Abdominal                     MEDICATIONS:     Antibiotics (From admission, onward)      None          VTE Risk Mitigation (From admission, onward)      None              Current Medications[1]       History:   There are no hospital problems to display for this patient.    Surgical History:    has a past surgical history that includes Colposcopy (2019); Tongue surgery; Fixation of tendon (Right, 3/12/2024); Arthroscopy of shoulder with decompression of subacromial space (3/12/2024); and Arthroscopic debridement of shoulder (Right, 3/12/2024).   Social History:    reports being sexually active and has had partner(s) who are male. She reports using the following methods of birth control/protection: Condom and I.U.D..  reports that she has been smoking cigarettes. She has been exposed to tobacco smoke. She has never used smokeless tobacco. She reports current alcohol use. She reports current drug use. Drug:  "Marijuana.     OBJECTIVE:     Vital Signs (Most Recent):    Vital Signs Range (Last 24H):          There is no height or weight on file to calculate BMI.   Wt Readings from Last 4 Encounters:   03/13/25 78 kg (172 lb)   01/17/25 78 kg (172 lb)   01/10/25 78.1 kg (172 lb 2.9 oz)   11/22/24 77 kg (169 lb 12.1 oz)       Significant Labs:  Lab Results   Component Value Date    WBC 8.73 01/10/2025    HGB 13.4 01/10/2025    HCT 40.3 01/10/2025     01/10/2025     01/10/2025    K 4.6 01/10/2025     01/10/2025    CREATININE 0.7 01/10/2025    BUN 16 01/10/2025    CO2 24 01/10/2025    GLU 83 01/10/2025    CALCIUM 9.7 01/10/2025    MG 2.1 05/24/2018    PHOS 3.9 05/24/2018    ALKPHOS 60 01/10/2025    ALT 43 01/10/2025    AST 24 01/10/2025    ALBUMIN 4.1 01/10/2025    INR 1.0 05/24/2018    APTT 27.1 05/24/2018    HGBA1C 5.1 01/10/2025    TROPONINI <0.006 05/24/2018    BNP <10 05/24/2018     No LMP recorded. (Menstrual status: Birth Control).  No results found for this or any previous visit (from the past 72 hours).    EKG:   Results for orders placed or performed during the hospital encounter of 05/24/18   EKG 12-lead    Collection Time: 05/24/18  2:51 AM    Narrative    Test Reason : R05,  Blood Pressure :mmHG  Vent. Rate : 086 BPM     Atrial Rate : 086 BPM     P-R Int : 140 ms          QRS Dur : 074 ms      QT Int : 388 ms       P-R-T Axes : 030 024 035 degrees     QTc Int : 464 ms    Normal sinus rhythm  Low voltage QRS  Borderline Abnormal ECG  No previous ECGs available  Confirmed by MD AMI, PANKAJ ([id number]) on 5/26/2018 3:41:27 PM    Referred By: [email or web address]   SELF           Confirmed By:PANKAJ MUELLER MD       TTE:  No results found for this or any previous visit.  No results found for: "EF"   No results found for this or any previous visit.  GHAZAL:  No results found for this or any previous visit.  Stress Test:  No results found for this or any previous visit.     LHC:  No results found for this or any previous " "visit.     PFT:  No results found for: "FEV1", "FVC", "ALU4ZUT", "TLC", "DLCO"     ASSESSMENT/PLAN:                                                                                                                  04/23/2025  Paul Fitzgerald is a 40 y.o., female.      Pre-op Assessment    I have reviewed the Patient Summary Reports.    I have reviewed the NPO Status.   I have reviewed the Medications.     Review of Systems  Anesthesia Hx:  No problems with previous Anesthesia             Denies Family Hx of Anesthesia complications.    Denies Personal Hx of Anesthesia complications.                    Hematology/Oncology:  Hematology Normal   Oncology Normal                                   EENT/Dental:  EENT/Dental Normal           Cardiovascular:  Cardiovascular Normal                                              Pulmonary:        Sleep Apnea                Renal/:  Renal/ Normal                 Hepatic/GI:  Hepatic/GI Normal Bowel Prep.                   Musculoskeletal:  Musculoskeletal Normal                Neurological:  Neurology Normal                                      Endocrine:  Endocrine Normal          Obesity / BMI > 30  Dermatological:  Skin Normal    Psych:  Psychiatric History                  Physical Exam  General: Well nourished, Cooperative, Alert and Oriented    Chest/Lungs:  Normal Respiratory Rate    Heart:  Rate: Normal  Rhythm: Regular Rhythm        Anesthesia Plan  Type of Anesthesia, risks & benefits discussed:    Anesthesia Type: Gen Natural Airway  Intra-op Monitoring Plan: Standard ASA Monitors  Post Op Pain Control Plan: multimodal analgesia  Induction:  IV  Informed Consent: Informed consent signed with the Patient and all parties understand the risks and agree with anesthesia plan.  All questions answered.   ASA Score: 3  Day of Surgery Review of History & Physical: H&P Update referred to the surgeon/provider.    Ready For Surgery From Anesthesia Perspective.     .       "     [1]   No current facility-administered medications for this encounter.     Current Outpatient Medications   Medication Sig Dispense Refill    albuterol (VENTOLIN HFA) 90 mcg/actuation inhaler Inhale 2 puffs into the lungs every 6 (six) hours as needed for Wheezing. Rescue 18 g 0    copper intrauterine device (PARAGARD T 380A) 380 square mm IUD 1 each by Intrauterine route.

## 2025-04-25 ENCOUNTER — ANESTHESIA (OUTPATIENT)
Dept: ENDOSCOPY | Facility: HOSPITAL | Age: 40
End: 2025-04-25
Payer: COMMERCIAL

## 2025-04-25 ENCOUNTER — HOSPITAL ENCOUNTER (OUTPATIENT)
Facility: HOSPITAL | Age: 40
Discharge: HOME | End: 2025-04-25
Attending: INTERNAL MEDICINE | Admitting: INTERNAL MEDICINE
Payer: COMMERCIAL

## 2025-04-25 VITALS
DIASTOLIC BLOOD PRESSURE: 56 MMHG | HEART RATE: 65 BPM | OXYGEN SATURATION: 98 % | RESPIRATION RATE: 20 BRPM | HEIGHT: 59 IN | SYSTOLIC BLOOD PRESSURE: 95 MMHG | TEMPERATURE: 99 F | WEIGHT: 172 LBS | BODY MASS INDEX: 34.68 KG/M2

## 2025-04-25 DIAGNOSIS — Z12.11 SCREEN FOR COLON CANCER: ICD-10-CM

## 2025-04-25 DIAGNOSIS — Z12.11 SCREENING FOR COLON CANCER: ICD-10-CM

## 2025-04-25 DIAGNOSIS — Z80.0 FAMILY HISTORY OF COLON CANCER IN MOTHER: Primary | ICD-10-CM

## 2025-04-25 LAB
B-HCG UR QL: NEGATIVE
CTP QC/QA: YES

## 2025-04-25 PROCEDURE — 81025 URINE PREGNANCY TEST: CPT | Performed by: INTERNAL MEDICINE

## 2025-04-25 PROCEDURE — 45385 COLONOSCOPY W/LESION REMOVAL: CPT | Mod: 33 | Performed by: INTERNAL MEDICINE

## 2025-04-25 PROCEDURE — 25000003 PHARM REV CODE 250: Performed by: INTERNAL MEDICINE

## 2025-04-25 PROCEDURE — 37000008 HC ANESTHESIA 1ST 15 MINUTES: Performed by: INTERNAL MEDICINE

## 2025-04-25 PROCEDURE — 37000009 HC ANESTHESIA EA ADD 15 MINS: Performed by: INTERNAL MEDICINE

## 2025-04-25 PROCEDURE — 88305 TISSUE EXAM BY PATHOLOGIST: CPT | Mod: TC | Performed by: INTERNAL MEDICINE

## 2025-04-25 PROCEDURE — 99900035 HC TECH TIME PER 15 MIN (STAT)

## 2025-04-25 PROCEDURE — 45385 COLONOSCOPY W/LESION REMOVAL: CPT | Mod: 33,,, | Performed by: INTERNAL MEDICINE

## 2025-04-25 PROCEDURE — 63600175 PHARM REV CODE 636 W HCPCS: Performed by: NURSE ANESTHETIST, CERTIFIED REGISTERED

## 2025-04-25 PROCEDURE — 94761 N-INVAS EAR/PLS OXIMETRY MLT: CPT

## 2025-04-25 RX ORDER — PROPOFOL 10 MG/ML
VIAL (ML) INTRAVENOUS
Status: DISCONTINUED | OUTPATIENT
Start: 2025-04-25 | End: 2025-04-25

## 2025-04-25 RX ORDER — SODIUM CHLORIDE 9 MG/ML
INJECTION, SOLUTION INTRAVENOUS CONTINUOUS
Status: DISCONTINUED | OUTPATIENT
Start: 2025-04-25 | End: 2025-04-25 | Stop reason: HOSPADM

## 2025-04-25 RX ORDER — PROPOFOL 10 MG/ML
VIAL (ML) INTRAVENOUS CONTINUOUS PRN
Status: DISCONTINUED | OUTPATIENT
Start: 2025-04-25 | End: 2025-04-25

## 2025-04-25 RX ORDER — LIDOCAINE HYDROCHLORIDE 20 MG/ML
INJECTION INTRAVENOUS
Status: DISCONTINUED | OUTPATIENT
Start: 2025-04-25 | End: 2025-04-25

## 2025-04-25 RX ADMIN — LIDOCAINE HYDROCHLORIDE 50 MG: 20 INJECTION INTRAVENOUS at 11:04

## 2025-04-25 RX ADMIN — PROPOFOL 150 MCG/KG/MIN: 10 INJECTION, EMULSION INTRAVENOUS at 11:04

## 2025-04-25 RX ADMIN — PROPOFOL 30 MG: 10 INJECTION, EMULSION INTRAVENOUS at 11:04

## 2025-04-25 RX ADMIN — SODIUM CHLORIDE: 0.9 INJECTION, SOLUTION INTRAVENOUS at 10:04

## 2025-04-25 RX ADMIN — PROPOFOL 70 MG: 10 INJECTION, EMULSION INTRAVENOUS at 11:04

## 2025-04-25 NOTE — H&P
Short Stay Endoscopy History and Physical    PCP - No primary care provider on file.    Procedure - Colonoscopy  Sedation: GA  ASA - per anesthesia  Mallampati - per anesthesia  History of Anesthesia problems - no  Family history Anesthesia problems -  no     HPI:  This is a 40 y.o. female here for evaluation of : Mother with CRC    Reflux - no  Dysphagia - no  Abdominal pain - no  Diarrhea - no    ROS:  Constitutional: No fevers, chills, No weight loss  ENT: No allergies  CV: No chest pain  Pulm: No cough, No shortness of breath  Ophtho: No vision changes  GI: see HPI  Medical History:  has no past medical history on file.    Surgical History:  has a past surgical history that includes Colposcopy (2019); Tongue surgery; Fixation of tendon (Right, 3/12/2024); Arthroscopy of shoulder with decompression of subacromial space (3/12/2024); and Arthroscopic debridement of shoulder (Right, 3/12/2024).    Family History: family history includes Cancer in her mother; No Known Problems in her father.. Otherwise no colon cancer, inflammatory bowel disease, or GI malignancies.    Social History:  reports that she has been smoking cigarettes. She has been exposed to tobacco smoke. She has never used smokeless tobacco. She reports current alcohol use. She reports current drug use. Drug: Marijuana.    Review of patient's allergies indicates:   Allergen Reactions    Penicillins Hives       Medications:   Prescriptions Prior to Admission[1]    Objective Findings:    Vital Signs: Per nursing notes.    Physical Exam:  General Appearance: Well appearing in no acute distress  Head:   Normocephalic, without obvious abnormality  Eyes:    No scleral icterus  Airway: Open  Neck: No restriction in mobility  Lungs: CTA bilaterally in anterior and posterior fields, no wheezes, no crackles.  Heart:  Regular rate and rhythm, S1, S2 normal, no murmurs heard  Abdomen: Soft, non tender, non distended      Labs:  Lab Results   Component Value Date     WBC 8.73 01/10/2025    HGB 13.4 01/10/2025    HCT 40.3 01/10/2025     01/10/2025    CHOL 209 (H) 01/10/2025    TRIG 92 01/10/2025    HDL 52 01/10/2025    ALT 43 01/10/2025    AST 24 01/10/2025     01/10/2025    K 4.6 01/10/2025     01/10/2025    CREATININE 0.7 01/10/2025    BUN 16 01/10/2025    CO2 24 01/10/2025    TSH 2.131 01/10/2025    INR 1.0 05/24/2018    HGBA1C 5.1 01/10/2025         I have explained the risks and benefits of endoscopy procedures to the patient including but not limited to bleeding, perforation, infection, and death.    Thank you so much for allowing me to participate in the care of Paul Cruz MD         [1]   Medications Prior to Admission   Medication Sig Dispense Refill Last Dose/Taking    albuterol (VENTOLIN HFA) 90 mcg/actuation inhaler Inhale 2 puffs into the lungs every 6 (six) hours as needed for Wheezing. Rescue 18 g 0     copper intrauterine device (PARAGARD T 380A) 380 square mm IUD 1 each by Intrauterine route.

## 2025-04-25 NOTE — TRANSFER OF CARE
"Anesthesia Transfer of Care Note    Patient: Paul Fitzgerald    Procedure(s) Performed: Procedure(s) (LRB):  COLONOSCOPY, SCREENING, LOW RISK PATIENT (N/A)    Patient location: GI    Anesthesia Type: general    Transport from OR: Transported from OR on room air with adequate spontaneous ventilation    Post pain: adequate analgesia    Post assessment: no apparent anesthetic complications and tolerated procedure well    Post vital signs: stable    Level of consciousness: awake, alert and oriented    Nausea/Vomiting: no nausea/vomiting    Complications: none    Transfer of care protocol was followed    Last vitals: Visit Vitals  BP (!) 102/57   Pulse 71   Temp 36.9 °C (98.4 °F) (Temporal)   Resp 14   Ht 4' 11" (1.499 m)   Wt 78 kg (172 lb)   LMP 04/04/2025 (Exact Date)   SpO2 97%   Breastfeeding No   BMI 34.74 kg/m²     "

## 2025-04-25 NOTE — PLAN OF CARE
Pt in preop bay 3, VSS and IV inserted. Pt denies any open wounds on body or the use of any weight loss injections. Pt needs procedural consents and anesthesia consents, otherwise ready to roll.

## 2025-04-25 NOTE — [employer] PATIENT INSTRUCTIONS
Discharge Summary/Instructions after an Endoscopic Procedure  Patient Name: Paul Fitzgerald  Patient MRN: [id number]  Patient YOB: 1985  Friday, April 25, 2025  Babak Cruz MD  Dear patient,  As a result of recent federal legislation (The Federal Cures Act), you may   receive lab or pathology results from your procedure in your [facility]   account before your physician is able to contact you. Your physician or   their representative will relay the results to you with their   recommendations at their soonest availability.  Thank you,  RESTRICTIONS:  During your procedure today, you received medications for sedation.  These   medications may affect your judgment, balance and coordination.  Therefore,   for 24 hours, you have the following restrictions:   - DO NOT drive a car, operate machinery, make legal/financial decisions,   sign important papers or drink alcohol.    ACTIVITY:  Today: no heavy lifting, straining or running due to procedural   sedation/anesthesia.  The following day: return to full activity including work.  DIET:  Eat and drink normally unless instructed otherwise.     TREATMENT FOR COMMON SIDE EFFECTS:  - Mild abdominal pain, nausea, belching, bloating or excessive gas:  rest,   eat lightly and use a heating pad.  - Sore Throat: treat with throat lozenges and/or gargle with warm salt   water.  - Because air was used during the procedure, expelling large amounts of air   from your rectum or belching is normal.  - If a bowel prep was taken, you may not have a bowel movement for 1-3 days.    This is normal.  SYMPTOMS TO WATCH FOR AND REPORT TO YOUR PHYSICIAN:  1. Abdominal pain or bloating, other than gas cramps.  2. Chest pain.  3. Back pain.  4. Signs of infection such as: chills or fever occurring within 24 hours   after the procedure.  5. Rectal bleeding, which would show as bright red, maroon, or black stools.   (A tablespoon of blood from the rectum is not serious, especially if    hemorrhoids are present.)  6. Vomiting.  7. Weakness or dizziness.  GO DIRECTLY TO THE NEAREST EMERGENCY ROOM IF YOU HAVE ANY OF THE FOLLOWING:      Difficulty breathing              Chills and/or fever over 101 F   Persistent vomiting and/or vomiting blood   Severe abdominal pain   Severe chest pain   Black, tarry stools   Bleeding- more than one tablespoon   Any other symptom or condition that you feel may need urgent attention  Your doctor recommends these additional instructions:  If any biopsies were taken, your doctors clinic will contact you in 1 to 2   weeks with any results.  - Patient has a contact number available for emergencies.  The signs and   symptoms of potential delayed complications were discussed with the   patient.  Return to normal activities tomorrow.  Written discharge   instructions were provided to the patient.   - Discharge patient to home.   - Resume previous diet.   - Continue present medications.   - Await pathology results.   - Repeat colonoscopy in 5 years for surveillance.   For questions, problems or results please call your physician - Babak Cruz MD at Work:  [phone].  [facility], EMERGENCY ROOM PHONE NUMBER: [phone]  IF A COMPLICATION OR EMERGENCY SITUATION ARISES AND YOU ARE UNABLE TO REACH   YOUR PHYSICIAN - GO DIRECTLY TO THE EMERGENCY ROOM.  Babak Cruz MD  4/25/2025 11:30:58 AM  This report has been verified and signed electronically.  Dear patient,  As a result of recent federal legislation (The Federal Cures Act), you may   receive lab or pathology results from your procedure in your [facility]   account before your physician is able to contact you. Your physician or   their representative will relay the results to you with their   recommendations at their soonest availability.  Thank you,  [employer]ATION

## 2025-04-28 LAB
ESTROGEN SERPL-MCNC: NORMAL PG/ML
INSULIN SERPL-ACNC: NORMAL U[IU]/ML
LAB AP CLINICAL INFORMATION: NORMAL
LAB AP GROSS DESCRIPTION: NORMAL
LAB AP PERFORMING LOCATION(S): NORMAL
LAB AP REPORT FOOTNOTES: NORMAL

## 2025-04-28 NOTE — ANESTHESIA POSTPROCEDURE EVALUATION
Anesthesia Post Evaluation    Patient: Paul Fitzgerald    Procedure(s) Performed: Procedure(s) (LRB):  COLONOSCOPY, SCREENING, LOW RISK PATIENT (N/A)    Final Anesthesia Type: general      Patient location during evaluation: GI PACU  Patient participation: Yes- Able to Participate  Level of consciousness: awake and alert and oriented  Post-procedure vital signs: reviewed and stable  Pain management: adequate  Airway patency: patent    PONV status at discharge: No PONV  Anesthetic complications: no      Cardiovascular status: hemodynamically stable  Respiratory status: spontaneous ventilation and unassisted  Hydration status: euvolemic  Follow-up not needed.              Vitals Value Taken Time   BP 95/56 04/25/25 11:55   Temp 36.9 °C (98.5 °F) 04/25/25 11:30   Pulse 63 04/25/25 11:56   Resp 21 04/25/25 11:56   SpO2 100 % 04/25/25 11:56   Vitals shown include unfiled device data.      Event Time   Out of Recovery 11:45:00         Pain/Una Score: No data recorded

## 2025-04-29 ENCOUNTER — RESULTS FOLLOW-UP (OUTPATIENT)
Dept: URGENT CARE | Facility: CLINIC | Age: 40
End: 2025-04-29

## 2025-05-01 ENCOUNTER — TELEPHONE (OUTPATIENT)
Dept: GASTROENTEROLOGY | Facility: CLINIC | Age: 40
End: 2025-05-01
Payer: COMMERCIAL

## 2025-05-01 NOTE — TELEPHONE ENCOUNTER
----- Message from Babak Cruz MD sent at 5/1/2025  2:20 PM CDT -----  Please call and notify patient, the colon polyps were benign.    ----- Message -----  From: Lab, Background User  Sent: 4/28/2025   4:55 PM CDT  To: Babak Cruz MD

## (undated) DEVICE — NDL 18GA X1 1/2 REG BEVEL

## (undated) DEVICE — SYR IRRIGATION BULB STER 60ML

## (undated) DEVICE — WRAP SHLDR HIP ACCU THRM PACK

## (undated) DEVICE — PROBE ARTHO ENERGY 90 DEG

## (undated) DEVICE — ADHESIVE MASTISOL VIAL 48/BX

## (undated) DEVICE — SUPPORT SLING SHOT II MEDIUM

## (undated) DEVICE — GLOVE SENSICARE PI GRN 8.5

## (undated) DEVICE — GLOVE BIOGEL PI MICRO INDIC 7

## (undated) DEVICE — TUBE SET INFLOW/OUTFLOW

## (undated) DEVICE — NDL SCORPION HD MEGALOADER

## (undated) DEVICE — MARKER SKIN RULER STERILE

## (undated) DEVICE — SUT VICRYL 3-0 27 SH

## (undated) DEVICE — PENCIL ROCKER SWITCH 10FT CORD

## (undated) DEVICE — SUT PDS II 0 CT-1 VIL MONO

## (undated) DEVICE — DRAPE STERI-DRAPE 1000 17X11IN

## (undated) DEVICE — TUBING SUC UNIV W/CONN 12FT

## (undated) DEVICE — Device

## (undated) DEVICE — DRAPE STERI U-SHAPED 47X51IN

## (undated) DEVICE — ADHESIVE DERMABOND ADVANCED

## (undated) DEVICE — DRESSING AQUACEL AG SILVER 4X4

## (undated) DEVICE — YANKAUER OPEN TIP W/O VENT

## (undated) DEVICE — SUT MONOCRYL 3-0 PS-1

## (undated) DEVICE — BNDG COFLEX FOAM LF2 ST 6X5YD

## (undated) DEVICE — SYR 30CC LUER LOCK

## (undated) DEVICE — KIT SHOULDER POSITIONER SPIDER

## (undated) DEVICE — DRAPE STERI INSTRUMENT 1018

## (undated) DEVICE — SUT CTD VICRYL CT-1 UND BR

## (undated) DEVICE — GLOVE SENSICARE PI GRN 7

## (undated) DEVICE — SOL NACL IRR 3000ML

## (undated) DEVICE — COVER LIGHT HANDLE 80/CA

## (undated) DEVICE — STRIP MEDI WND CLSR 1/2X4IN

## (undated) DEVICE — DRESSING AQUACEL AG ADV 3.5X6

## (undated) DEVICE — TOWEL OR DISP STRL BLUE 4/PK

## (undated) DEVICE — GLOVE SENSICARE PI SURG 8

## (undated) DEVICE — COVER CAMERA OPERATING ROOM

## (undated) DEVICE — BLADE SHAVER LANZA 4.2X13CM

## (undated) DEVICE — BLADE SHAVER 4.5 6/BX

## (undated) DEVICE — BLADE POWERASP 4.0MMX13CM

## (undated) DEVICE — GLOVE SENSICARE PI MICRO 6.5